# Patient Record
Sex: FEMALE | Race: WHITE | NOT HISPANIC OR LATINO | Employment: FULL TIME | URBAN - METROPOLITAN AREA
[De-identification: names, ages, dates, MRNs, and addresses within clinical notes are randomized per-mention and may not be internally consistent; named-entity substitution may affect disease eponyms.]

---

## 2021-06-12 ENCOUNTER — HOSPITAL ENCOUNTER (EMERGENCY)
Facility: HOSPITAL | Age: 36
Discharge: HOME | End: 2021-06-12
Attending: EMERGENCY MEDICINE | Admitting: EMERGENCY MEDICINE
Payer: COMMERCIAL

## 2021-06-12 ENCOUNTER — APPOINTMENT (EMERGENCY)
Dept: RADIOLOGY | Facility: HOSPITAL | Age: 36
End: 2021-06-12
Payer: COMMERCIAL

## 2021-06-12 VITALS
SYSTOLIC BLOOD PRESSURE: 139 MMHG | BODY MASS INDEX: 32.82 KG/M2 | OXYGEN SATURATION: 94 % | HEIGHT: 65 IN | RESPIRATION RATE: 18 BRPM | WEIGHT: 197 LBS | DIASTOLIC BLOOD PRESSURE: 83 MMHG | HEART RATE: 84 BPM | TEMPERATURE: 97.5 F

## 2021-06-12 DIAGNOSIS — J45.909 ASTHMATIC BRONCHITIS: Primary | ICD-10-CM

## 2021-06-12 LAB — SARS-COV-2 RNA RESP QL NAA+PROBE: NEGATIVE

## 2021-06-12 PROCEDURE — U0005 INFEC AGEN DETEC AMPLI PROBE: HCPCS | Performed by: PHYSICIAN ASSISTANT

## 2021-06-12 PROCEDURE — 71045 X-RAY EXAM CHEST 1 VIEW: CPT

## 2021-06-12 PROCEDURE — U0003 INFECTIOUS AGENT DETECTION BY NUCLEIC ACID (DNA OR RNA); SEVERE ACUTE RESPIRATORY SYNDROME CORONAVIRUS 2 (SARS-COV-2) (CORONAVIRUS DISEASE [COVID-19]), AMPLIFIED PROBE TECHNIQUE, MAKING USE OF HIGH THROUGHPUT TECHNOLOGIES AS DESCRIBED BY CMS-2020-01-R: HCPCS | Performed by: PHYSICIAN ASSISTANT

## 2021-06-12 PROCEDURE — 99284 EMERGENCY DEPT VISIT MOD MDM: CPT | Performed by: PHYSICIAN ASSISTANT

## 2021-06-12 PROCEDURE — 99285 EMERGENCY DEPT VISIT HI MDM: CPT

## 2021-06-12 RX ORDER — ALBUTEROL SULFATE 90 UG/1
4 AEROSOL, METERED RESPIRATORY (INHALATION) ONCE
Status: COMPLETED | OUTPATIENT
Start: 2021-06-12 | End: 2021-06-12

## 2021-06-12 RX ORDER — AZITHROMYCIN 250 MG/1
250 TABLET, FILM COATED ORAL EVERY 24 HOURS
Qty: 4 TABLET | Refills: 0 | Status: SHIPPED | OUTPATIENT
Start: 2021-06-13 | End: 2021-06-17

## 2021-06-12 RX ORDER — ALBUTEROL SULFATE 90 UG/1
2 AEROSOL, METERED RESPIRATORY (INHALATION) EVERY 4 HOURS PRN
Qty: 8 G | Refills: 0 | Status: SHIPPED | OUTPATIENT
Start: 2021-06-12

## 2021-06-12 RX ORDER — AZITHROMYCIN 500 MG/1
500 TABLET, FILM COATED ORAL ONCE
Status: COMPLETED | OUTPATIENT
Start: 2021-06-12 | End: 2021-06-12

## 2021-06-12 RX ADMIN — AZITHROMYCIN 500 MG: 500 TABLET, FILM COATED ORAL at 20:11

## 2021-06-12 RX ADMIN — ALBUTEROL SULFATE 4 PUFF: 90 AEROSOL, METERED RESPIRATORY (INHALATION) at 19:27

## 2021-06-12 NOTE — Clinical Note
Surendra Starr was seen and treated in our emergency department on 6/12/2021  Diagnosis:     Kofi Segal  may return to work on return date  She may return on this date: 06/16/2021         If you have any questions or concerns, please don't hesitate to call        Sherine Alicia PA-C    ______________________________           _______________          _______________  Hospital Representative                              Date                                Time

## 2021-06-12 NOTE — ED PROVIDER NOTES
History  Chief Complaint   Patient presents with    Shortness of Breath     pt started with a sore throat on wednesday and seen by pcp, told she had broncitis  pt states she feels like she cant take a deep breath and has fevers in 100 0 at home  pt states she has a cough and is bringing up mucus  Patient is a 42 y/o F with h/o bipolar disorder that presents to the ED with cough, runny nose, sore throat that started 2 days ago  She saw her PCP yesterday and was started on prednisone for bronchitis  She states she does not feel better  She denies fevers, chills  She states she is coughing up yellow mucus  No sick contacts  She has not received the covid vaccine, but states she was tested 4 days ago for work and was negative  She denies chest pain  She does feel SOB  No leg pain or swelling  History provided by:  Patient  Shortness of Breath  Severity:  Moderate  Onset quality:  Gradual  Duration:  3 days  Timing:  Constant  Progression:  Worsening  Chronicity:  New  Context: URI    Relieved by:  Nothing  Worsened by:  Exertion  Ineffective treatments: prednisone  Associated symptoms: cough, sore throat and wheezing    Associated symptoms: no abdominal pain, no chest pain, no fever, no rash and no vomiting    Risk factors: no prolonged immobilization, no recent surgery and no tobacco use        Prior to Admission Medications   Prescriptions Last Dose Informant Patient Reported? Taking?    lurasidone (LATUDA) 20 mg tablet Not Taking at Unknown time  No No   Sig: Take 1 tablet by mouth daily with dinner   Patient not taking: Reported on 6/12/2021   meclizine (ANTIVERT) 25 mg tablet   No No   Sig: Take 1 tablet by mouth 3 (three) times a day as needed for dizziness for up to 30 days   methocarbamol (ROBAXIN) 500 mg tablet   No No   Sig: Take 1 tablet by mouth 2 (two) times a day for 30 days   naproxen (NAPROSYN) 500 mg tablet   No No   Sig: Take 1 tablet by mouth 2 (two) times a day with meals for 30 days      Facility-Administered Medications: None       Past Medical History:   Diagnosis Date    Anxiety     Bipolar disorder ([facility] )     Borderline personality disorder ([facility] )     Depression     Psychiatric illness     Self-injurious behavior     Suicide attempt ([facility] )        History reviewed  No pertinent surgical history  Family History   Problem Relation Age of Onset    Suicide Attempts Father     Self-Injury Father     Depression Maternal Uncle     Depression Paternal Uncle      I have reviewed and agree with the history as documented  E-Cigarette/Vaping     E-Cigarette/Vaping Substances     Social History     Tobacco Use    Smoking status: Current Every Day Smoker     Packs/day: 0 50    Smokeless tobacco: Never Used   Substance Use Topics    Alcohol use: Not Currently     Alcohol/week: 1 0 standard drinks     Types: 1 Glasses of wine per week     Comment: social    Drug use: No       Review of Systems   Constitutional: Negative for chills and fever  HENT: Positive for rhinorrhea and sore throat  Respiratory: Positive for cough, shortness of breath and wheezing  Cardiovascular: Negative for chest pain and leg swelling  Gastrointestinal: Negative for abdominal pain, diarrhea, nausea and vomiting  Skin: Negative for color change and rash  Neurological: Negative for dizziness, weakness and numbness  Psychiatric/Behavioral: Negative for confusion  All other systems reviewed and are negative  Physical Exam  Physical Exam  Vitals signs and nursing note reviewed  Constitutional:       General: She is not in acute distress  Appearance: Normal appearance  She is well-developed, well-groomed and overweight  She is not ill-appearing or diaphoretic  HENT:      Head: Normocephalic and atraumatic  Right Ear: External ear normal       Left Ear: External ear normal       Nose: Nose normal       Mouth/Throat:      Pharynx: Oropharynx is clear     Eyes: Conjunctiva/sclera: Conjunctivae normal    Neck:      Musculoskeletal: Normal range of motion and neck supple  Cardiovascular:      Rate and Rhythm: Normal rate and regular rhythm  Heart sounds: Normal heart sounds  Pulmonary:      Effort: Pulmonary effort is normal       Breath sounds: Wheezing (diffuse) present  No decreased breath sounds  Abdominal:      General: Abdomen is flat  Bowel sounds are normal       Palpations: Abdomen is soft  Tenderness: There is no abdominal tenderness  Musculoskeletal: Normal range of motion  General: No swelling or tenderness  Right lower leg: No edema  Left lower leg: No edema  Skin:     General: Skin is warm and dry  Coloration: Skin is not pale  Findings: No bruising or rash  Neurological:      General: No focal deficit present  Mental Status: She is alert and oriented to person, place, and time  Motor: No weakness  Psychiatric:         Mood and Affect: Mood normal          Behavior: Behavior is cooperative           Vital Signs  ED Triage Vitals [06/12/21 1859]   Temperature Pulse Respirations Blood Pressure SpO2   97 5 °F (36 4 °C) 85 18 129/77 93 %      Temp src Heart Rate Source Patient Position - Orthostatic VS BP Location FiO2 (%)   -- -- -- -- --      Pain Score       --           Vitals:    06/12/21 1859 06/12/21 1915   BP: 129/77 139/83   Pulse: 85 84         Visual Acuity  Visual Acuity      Most Recent Value   L Pupil Size (mm)  3   R Pupil Size (mm)  3          ED Medications  Medications   albuterol (PROVENTIL HFA,VENTOLIN HFA) inhaler 4 puff (4 puffs Inhalation Given 6/12/21 1927)   azithromycin (ZITHROMAX) tablet 500 mg (500 mg Oral Given 6/12/21 2011)       Diagnostic Studies  Results Reviewed     Procedure Component Value Units Date/Time    Novel Coronavirus (Covid-19),PCR [facility] - 2 Hour Stat [[id number]]  (Normal) Collected: 06/12/21 1927    Lab Status: Final result Specimen: Nasopharyngeal Swab Updated: 06/12/21 2024     SARS-CoV-2 Negative    Narrative: The specimen collection materials, transport medium, and/or testing methodology utilized in the production of these test results have been proven to be reliable in a limited validation with an abbreviated program under the Emergency Utilization Authorization provided by the FDA  Testing reported as "Presumptive positive" will be confirmed with secondary testing to ensure result accuracy  Clinical caution and judgement should be used with the interpretation of these results with consideration of the clinical impression and other laboratory testing  Testing reported as "Positive" or "Negative" has been proven to be accurate according to standard laboratory validation requirements  All testing is performed with control materials showing appropriate reactivity at standard intervals  XR chest 1 view portable   ED Interpretation by Catherine Saenz PA-C (06/12 1953)   No acute abnormalities  Procedures  Procedures         ED Course  ED Course as of Jun 12 2044   Sat Jun 12, 2021 1958 Patient feeling better, pulse ox 94% RA, wheezing improved, she does have rhonchi b/l bases  SBIRT 20yo+      Most Recent Value   SBIRT (22 yo +)   In order to provide better care to our patients, we are screening all of our patients for alcohol and drug use  Would it be okay to ask you these screening questions? No Filed at: 06/12/2021 1922                    MDM  Number of Diagnoses or Management Options  Asthmatic bronchitis: established and worsening  Diagnosis management comments: Patient with worsening cough, will order cxr to r/o pneumonia  covid test pending  Patient's pulse ox stabel at 94%, breath sounds improved with inhaler, patient currently on prednisone, will add inhaler and zpak to current treatment  Return precautions given          Amount and/or Complexity of Data Reviewed  Tests in the radiology section of CPT®: ordered and reviewed  Independent visualization of images, tracings, or specimens: yes    Patient Progress  Patient progress: improved      Disposition  Final diagnoses:   Asthmatic bronchitis     Time reflects when diagnosis was documented in both MDM as applicable and the Disposition within this note     Time User Action Codes Description Comment    6/12/2021  7:59 PM Lorie Sharpe [G50 284] Asthmatic bronchitis       ED Disposition     ED Disposition Condition Date/Time Comment    Discharge Stable Sat Jun 12, 2021  7:59  [address] discharge to home/self care              Follow-up Information     Follow up With Specialties Details Why Contact Info    Radha Rdz,  Family Medicine Call in 2 days For recheck [facility]  [address] [facility]  [id number] [place] [address] [address]            Discharge Medication List as of 6/12/2021  8:01 PM      START taking these medications    Details   albuterol (PROVENTIL HFA,VENTOLIN HFA) 90 mcg/act inhaler Inhale 2 puffs every 4 (four) hours as needed for wheezing, Starting Sat 6/12/2021, Normal      azithromycin (ZITHROMAX) 250 mg tablet Take 1 tablet (250 mg total) by mouth every 24 hours for 4 days Take 2 tablets today then 1 tablet daily x 4 days, Starting Sun 6/13/2021, Until Thu 6/17/2021, Normal         CONTINUE these medications which have NOT CHANGED    Details   lurasidone (LATUDA) 20 mg tablet Take 1 tablet by mouth daily with dinner, Starting 12/12/2016, Until Discontinued, Print      meclizine (ANTIVERT) 25 mg tablet Take 1 tablet by mouth 3 (three) times a day as needed for dizziness for up to 30 days, Starting 12/21/2016, Until Fri 1/20/17, Print      methocarbamol (ROBAXIN) 500 mg tablet Take 1 tablet by mouth 2 (two) times a day for 30 days, Starting 12/21/2016, Until Fri 1/20/17, Print      naproxen (NAPROSYN) 500 mg tablet Take 1 tablet by mouth 2 (two) times a day with meals for 30 days, Starting 12/21/2016, Until Fri 1/20/17, Print           No discharge procedures on file      PDMP Review     None          ED Provider  Electronically Signed by           Adrian Sr PA-C  06/12/21 5172

## 2021-06-13 NOTE — DISCHARGE INSTRUCTIONS
Rest, increase fluids  Continue prednisone  Take zithromax as directed  Use albuterol inhaler every 4 hours  Take mucinex daily  Follow up with family doctor in 2 days for recheck  Return to ER if symptoms worsen  Monitor pulse ox at home if it drops below 90% return to ER

## 2021-11-02 ENCOUNTER — HOSPITAL ENCOUNTER (EMERGENCY)
Facility: HOSPITAL | Age: 36
End: 2021-11-03
Attending: EMERGENCY MEDICINE | Admitting: EMERGENCY MEDICINE
Payer: COMMERCIAL

## 2021-11-02 DIAGNOSIS — R45.851 DEPRESSION WITH SUICIDAL IDEATION: Primary | ICD-10-CM

## 2021-11-02 DIAGNOSIS — F41.1 GENERALIZED ANXIETY DISORDER: ICD-10-CM

## 2021-11-02 DIAGNOSIS — F32.A DEPRESSION WITH SUICIDAL IDEATION: Primary | ICD-10-CM

## 2021-11-02 LAB
AMPHETAMINES SERPL QL SCN: NEGATIVE
BARBITURATES UR QL: NEGATIVE
BENZODIAZ UR QL: NEGATIVE
COCAINE UR QL: NEGATIVE
ETHANOL EXG-MCNC: 0 MG/DL
EXT PREG TEST URINE: NEGATIVE
EXT. CONTROL ED NAV: NORMAL
FLUAV RNA RESP QL NAA+PROBE: NEGATIVE
FLUBV RNA RESP QL NAA+PROBE: NEGATIVE
METHADONE UR QL: NEGATIVE
OPIATES UR QL SCN: NEGATIVE
OXYCODONE+OXYMORPHONE UR QL SCN: NEGATIVE
PCP UR QL: NEGATIVE
RSV RNA RESP QL NAA+PROBE: NEGATIVE
SARS-COV-2 RNA RESP QL NAA+PROBE: NEGATIVE
THC UR QL: NEGATIVE

## 2021-11-02 PROCEDURE — 99285 EMERGENCY DEPT VISIT HI MDM: CPT | Performed by: PHYSICIAN ASSISTANT

## 2021-11-02 PROCEDURE — 99285 EMERGENCY DEPT VISIT HI MDM: CPT

## 2021-11-02 PROCEDURE — 82075 ASSAY OF BREATH ETHANOL: CPT | Performed by: PHYSICIAN ASSISTANT

## 2021-11-02 PROCEDURE — 0241U HB NFCT DS VIR RESP RNA 4 TRGT: CPT | Performed by: PHYSICIAN ASSISTANT

## 2021-11-02 PROCEDURE — 81025 URINE PREGNANCY TEST: CPT | Performed by: PHYSICIAN ASSISTANT

## 2021-11-02 PROCEDURE — 80307 DRUG TEST PRSMV CHEM ANLYZR: CPT | Performed by: PHYSICIAN ASSISTANT

## 2021-11-02 RX ORDER — LANOLIN ALCOHOL/MO/W.PET/CERES
3 CREAM (GRAM) TOPICAL
Status: CANCELLED | OUTPATIENT
Start: 2021-11-02

## 2021-11-02 RX ORDER — BENZTROPINE MESYLATE 0.5 MG/1
1 TABLET ORAL
Status: CANCELLED | OUTPATIENT
Start: 2021-11-02

## 2021-11-02 RX ORDER — ACETAMINOPHEN 325 MG/1
975 TABLET ORAL EVERY 6 HOURS PRN
Status: CANCELLED | OUTPATIENT
Start: 2021-11-02

## 2021-11-02 RX ORDER — BENZTROPINE MESYLATE 1 MG/ML
1 INJECTION INTRAMUSCULAR; INTRAVENOUS
Status: CANCELLED | OUTPATIENT
Start: 2021-11-02

## 2021-11-02 RX ORDER — DIPHENHYDRAMINE HYDROCHLORIDE 50 MG/ML
50 INJECTION INTRAMUSCULAR; INTRAVENOUS EVERY 6 HOURS PRN
Status: CANCELLED | OUTPATIENT
Start: 2021-11-02

## 2021-11-02 RX ORDER — LORAZEPAM 2 MG/ML
2 INJECTION INTRAMUSCULAR EVERY 6 HOURS PRN
Status: CANCELLED | OUTPATIENT
Start: 2021-11-02

## 2021-11-02 RX ORDER — MAGNESIUM HYDROXIDE/ALUMINUM HYDROXICE/SIMETHICONE 120; 1200; 1200 MG/30ML; MG/30ML; MG/30ML
30 SUSPENSION ORAL EVERY 4 HOURS PRN
Status: CANCELLED | OUTPATIENT
Start: 2021-11-02

## 2021-11-02 RX ORDER — HALOPERIDOL 5 MG
5 TABLET ORAL
Status: CANCELLED | OUTPATIENT
Start: 2021-11-02

## 2021-11-02 RX ORDER — MINERAL OIL AND PETROLATUM 150; 830 MG/G; MG/G
1 OINTMENT OPHTHALMIC
Status: CANCELLED | OUTPATIENT
Start: 2021-11-02

## 2021-11-02 RX ORDER — HALOPERIDOL 1 MG/1
1 TABLET ORAL EVERY 6 HOURS PRN
Status: CANCELLED | OUTPATIENT
Start: 2021-11-02

## 2021-11-02 RX ORDER — NICOTINE 21 MG/24HR
1 PATCH, TRANSDERMAL 24 HOURS TRANSDERMAL DAILY
Status: CANCELLED | OUTPATIENT
Start: 2021-11-03

## 2021-11-02 RX ORDER — TRAZODONE HYDROCHLORIDE 50 MG/1
50 TABLET ORAL
Status: CANCELLED | OUTPATIENT
Start: 2021-11-02

## 2021-11-02 RX ORDER — ACETAMINOPHEN 325 MG/1
650 TABLET ORAL EVERY 6 HOURS PRN
Status: CANCELLED | OUTPATIENT
Start: 2021-11-02

## 2021-11-02 RX ORDER — BENZTROPINE MESYLATE 1 MG/ML
0.5 INJECTION INTRAMUSCULAR; INTRAVENOUS
Status: CANCELLED | OUTPATIENT
Start: 2021-11-02

## 2021-11-02 RX ORDER — BISACODYL 10 MG
10 SUPPOSITORY, RECTAL RECTAL DAILY PRN
Status: CANCELLED | OUTPATIENT
Start: 2021-11-02

## 2021-11-02 RX ORDER — AMOXICILLIN 250 MG
1 CAPSULE ORAL DAILY PRN
Status: CANCELLED | OUTPATIENT
Start: 2021-11-02

## 2021-11-02 RX ORDER — ACETAMINOPHEN 325 MG/1
650 TABLET ORAL EVERY 4 HOURS PRN
Status: CANCELLED | OUTPATIENT
Start: 2021-11-02

## 2021-11-02 RX ORDER — LORAZEPAM 2 MG/ML
1 INJECTION INTRAMUSCULAR
Status: CANCELLED | OUTPATIENT
Start: 2021-11-02

## 2021-11-02 RX ORDER — HYDROXYZINE HYDROCHLORIDE 25 MG/1
100 TABLET, FILM COATED ORAL
Status: CANCELLED | OUTPATIENT
Start: 2021-11-02

## 2021-11-02 RX ORDER — HALOPERIDOL 5 MG/ML
2.5 INJECTION INTRAMUSCULAR
Status: CANCELLED | OUTPATIENT
Start: 2021-11-02

## 2021-11-02 RX ORDER — HALOPERIDOL 5 MG/ML
5 INJECTION INTRAMUSCULAR
Status: CANCELLED | OUTPATIENT
Start: 2021-11-02

## 2021-11-02 RX ORDER — HYDROXYZINE HYDROCHLORIDE 25 MG/1
25 TABLET, FILM COATED ORAL
Status: CANCELLED | OUTPATIENT
Start: 2021-11-02

## 2021-11-02 RX ORDER — POLYETHYLENE GLYCOL 3350 17 G/17G
17 POWDER, FOR SOLUTION ORAL DAILY PRN
Status: CANCELLED | OUTPATIENT
Start: 2021-11-02

## 2021-11-02 RX ORDER — LORAZEPAM 2 MG/ML
2 INJECTION INTRAMUSCULAR
Status: CANCELLED | OUTPATIENT
Start: 2021-11-02

## 2021-11-02 RX ORDER — HYDROXYZINE HYDROCHLORIDE 25 MG/1
50 TABLET, FILM COATED ORAL
Status: CANCELLED | OUTPATIENT
Start: 2021-11-02

## 2021-11-03 ENCOUNTER — HOSPITAL ENCOUNTER (INPATIENT)
Facility: HOSPITAL | Age: 36
LOS: 7 days | Discharge: HOME | DRG: 885 | End: 2021-11-10
Attending: STUDENT IN AN ORGANIZED HEALTH CARE EDUCATION/TRAINING PROGRAM | Admitting: PSYCHIATRY & NEUROLOGY
Payer: COMMERCIAL

## 2021-11-03 VITALS
HEIGHT: 65 IN | RESPIRATION RATE: 17 BRPM | BODY MASS INDEX: 33.82 KG/M2 | OXYGEN SATURATION: 97 % | SYSTOLIC BLOOD PRESSURE: 122 MMHG | DIASTOLIC BLOOD PRESSURE: 67 MMHG | WEIGHT: 203 LBS | HEART RATE: 79 BPM | TEMPERATURE: 97.8 F

## 2021-11-03 DIAGNOSIS — G47.00 INSOMNIA: Primary | ICD-10-CM

## 2021-11-03 DIAGNOSIS — Z72.0 NICOTINE USE: ICD-10-CM

## 2021-11-03 DIAGNOSIS — B37.89 CANDIDA RASH OF GROIN: ICD-10-CM

## 2021-11-03 DIAGNOSIS — F41.1 GENERALIZED ANXIETY DISORDER: ICD-10-CM

## 2021-11-03 DIAGNOSIS — F31.32 BIPOLAR AFFECTIVE DISORDER, CURRENTLY DEPRESSED, MODERATE (HCC): ICD-10-CM

## 2021-11-03 PROCEDURE — 99222 1ST HOSP IP/OBS MODERATE 55: CPT | Performed by: STUDENT IN AN ORGANIZED HEALTH CARE EDUCATION/TRAINING PROGRAM

## 2021-11-03 RX ORDER — ESCITALOPRAM OXALATE 10 MG/1
10 TABLET ORAL DAILY
Status: DISCONTINUED | OUTPATIENT
Start: 2021-11-03 | End: 2021-11-05

## 2021-11-03 RX ORDER — MAGNESIUM HYDROXIDE/ALUMINUM HYDROXICE/SIMETHICONE 120; 1200; 1200 MG/30ML; MG/30ML; MG/30ML
30 SUSPENSION ORAL EVERY 4 HOURS PRN
Status: DISCONTINUED | OUTPATIENT
Start: 2021-11-03 | End: 2021-11-10 | Stop reason: HOSPADM

## 2021-11-03 RX ORDER — HALOPERIDOL 5 MG
5 TABLET ORAL
Status: DISCONTINUED | OUTPATIENT
Start: 2021-11-03 | End: 2021-11-10 | Stop reason: HOSPADM

## 2021-11-03 RX ORDER — HALOPERIDOL 5 MG/ML
5 INJECTION INTRAMUSCULAR
Status: DISCONTINUED | OUTPATIENT
Start: 2021-11-03 | End: 2021-11-10 | Stop reason: HOSPADM

## 2021-11-03 RX ORDER — BENZTROPINE MESYLATE 1 MG/ML
1 INJECTION INTRAMUSCULAR; INTRAVENOUS
Status: DISCONTINUED | OUTPATIENT
Start: 2021-11-03 | End: 2021-11-10 | Stop reason: HOSPADM

## 2021-11-03 RX ORDER — LORAZEPAM 2 MG/ML
2 INJECTION INTRAMUSCULAR EVERY 6 HOURS PRN
Status: DISCONTINUED | OUTPATIENT
Start: 2021-11-03 | End: 2021-11-10 | Stop reason: HOSPADM

## 2021-11-03 RX ORDER — ACETAMINOPHEN 325 MG/1
975 TABLET ORAL EVERY 6 HOURS PRN
Status: DISCONTINUED | OUTPATIENT
Start: 2021-11-03 | End: 2021-11-10 | Stop reason: HOSPADM

## 2021-11-03 RX ORDER — ACETAMINOPHEN 325 MG/1
650 TABLET ORAL EVERY 6 HOURS PRN
Status: DISCONTINUED | OUTPATIENT
Start: 2021-11-03 | End: 2021-11-10 | Stop reason: HOSPADM

## 2021-11-03 RX ORDER — BISACODYL 10 MG
10 SUPPOSITORY, RECTAL RECTAL DAILY PRN
Status: DISCONTINUED | OUTPATIENT
Start: 2021-11-03 | End: 2021-11-10 | Stop reason: HOSPADM

## 2021-11-03 RX ORDER — AMOXICILLIN 250 MG
1 CAPSULE ORAL DAILY PRN
Status: DISCONTINUED | OUTPATIENT
Start: 2021-11-03 | End: 2021-11-10 | Stop reason: HOSPADM

## 2021-11-03 RX ORDER — HALOPERIDOL 1 MG/1
1 TABLET ORAL EVERY 6 HOURS PRN
Status: DISCONTINUED | OUTPATIENT
Start: 2021-11-03 | End: 2021-11-10 | Stop reason: HOSPADM

## 2021-11-03 RX ORDER — ACETAMINOPHEN 325 MG/1
650 TABLET ORAL EVERY 4 HOURS PRN
Status: DISCONTINUED | OUTPATIENT
Start: 2021-11-03 | End: 2021-11-10 | Stop reason: HOSPADM

## 2021-11-03 RX ORDER — DIPHENHYDRAMINE HYDROCHLORIDE 50 MG/ML
50 INJECTION INTRAMUSCULAR; INTRAVENOUS EVERY 6 HOURS PRN
Status: DISCONTINUED | OUTPATIENT
Start: 2021-11-03 | End: 2021-11-10 | Stop reason: HOSPADM

## 2021-11-03 RX ORDER — HYDROXYZINE 50 MG/1
50 TABLET, FILM COATED ORAL
Status: DISCONTINUED | OUTPATIENT
Start: 2021-11-03 | End: 2021-11-10 | Stop reason: HOSPADM

## 2021-11-03 RX ORDER — LORAZEPAM 2 MG/ML
1 INJECTION INTRAMUSCULAR
Status: DISCONTINUED | OUTPATIENT
Start: 2021-11-03 | End: 2021-11-10 | Stop reason: HOSPADM

## 2021-11-03 RX ORDER — NICOTINE 21 MG/24HR
1 PATCH, TRANSDERMAL 24 HOURS TRANSDERMAL DAILY
Status: DISCONTINUED | OUTPATIENT
Start: 2021-11-03 | End: 2021-11-10 | Stop reason: HOSPADM

## 2021-11-03 RX ORDER — HYDROXYZINE HYDROCHLORIDE 25 MG/1
25 TABLET, FILM COATED ORAL
Status: DISCONTINUED | OUTPATIENT
Start: 2021-11-03 | End: 2021-11-10 | Stop reason: HOSPADM

## 2021-11-03 RX ORDER — LANOLIN ALCOHOL/MO/W.PET/CERES
3 CREAM (GRAM) TOPICAL
Status: DISCONTINUED | OUTPATIENT
Start: 2021-11-03 | End: 2021-11-10 | Stop reason: HOSPADM

## 2021-11-03 RX ORDER — MINERAL OIL AND PETROLATUM 150; 830 MG/G; MG/G
1 OINTMENT OPHTHALMIC
Status: DISCONTINUED | OUTPATIENT
Start: 2021-11-03 | End: 2021-11-10 | Stop reason: HOSPADM

## 2021-11-03 RX ORDER — HALOPERIDOL 5 MG
2.5 TABLET ORAL
Status: DISCONTINUED | OUTPATIENT
Start: 2021-11-03 | End: 2021-11-10 | Stop reason: HOSPADM

## 2021-11-03 RX ORDER — TRAZODONE HYDROCHLORIDE 50 MG/1
50 TABLET ORAL
Status: DISCONTINUED | OUTPATIENT
Start: 2021-11-03 | End: 2021-11-10 | Stop reason: HOSPADM

## 2021-11-03 RX ORDER — QUETIAPINE FUMARATE 25 MG/1
50 TABLET, FILM COATED ORAL
Status: DISCONTINUED | OUTPATIENT
Start: 2021-11-03 | End: 2021-11-09

## 2021-11-03 RX ORDER — ALBUTEROL SULFATE 90 UG/1
2 AEROSOL, METERED RESPIRATORY (INHALATION) EVERY 4 HOURS PRN
Status: DISCONTINUED | OUTPATIENT
Start: 2021-11-03 | End: 2021-11-10 | Stop reason: HOSPADM

## 2021-11-03 RX ORDER — HALOPERIDOL 5 MG/ML
2.5 INJECTION INTRAMUSCULAR
Status: DISCONTINUED | OUTPATIENT
Start: 2021-11-03 | End: 2021-11-10 | Stop reason: HOSPADM

## 2021-11-03 RX ORDER — POLYETHYLENE GLYCOL 3350 17 G/17G
17 POWDER, FOR SOLUTION ORAL DAILY PRN
Status: DISCONTINUED | OUTPATIENT
Start: 2021-11-03 | End: 2021-11-10 | Stop reason: HOSPADM

## 2021-11-03 RX ORDER — HYDROXYZINE 50 MG/1
100 TABLET, FILM COATED ORAL
Status: DISCONTINUED | OUTPATIENT
Start: 2021-11-03 | End: 2021-11-10 | Stop reason: HOSPADM

## 2021-11-03 RX ORDER — BENZTROPINE MESYLATE 1 MG/1
1 TABLET ORAL
Status: DISCONTINUED | OUTPATIENT
Start: 2021-11-03 | End: 2021-11-10 | Stop reason: HOSPADM

## 2021-11-03 RX ORDER — BENZTROPINE MESYLATE 1 MG/ML
0.5 INJECTION INTRAMUSCULAR; INTRAVENOUS
Status: DISCONTINUED | OUTPATIENT
Start: 2021-11-03 | End: 2021-11-10 | Stop reason: HOSPADM

## 2021-11-03 RX ORDER — LORAZEPAM 2 MG/ML
2 INJECTION INTRAMUSCULAR
Status: DISCONTINUED | OUTPATIENT
Start: 2021-11-03 | End: 2021-11-10 | Stop reason: HOSPADM

## 2021-11-03 RX ADMIN — QUETIAPINE FUMARATE 50 MG: 25 TABLET ORAL at 21:12

## 2021-11-03 RX ADMIN — Medication 3 MG: at 21:12

## 2021-11-03 RX ADMIN — ALBUTEROL SULFATE 2 PUFF: 90 AEROSOL, METERED RESPIRATORY (INHALATION) at 18:58

## 2021-11-03 RX ADMIN — ALUMINUM HYDROXIDE, MAGNESIUM HYDROXIDE, AND SIMETHICONE 30 ML: 200; 200; 20 SUSPENSION ORAL at 10:48

## 2021-11-03 RX ADMIN — ESCITALOPRAM OXALATE 10 MG: 10 TABLET ORAL at 11:51

## 2021-11-04 PROBLEM — J40 BRONCHITIS: Status: ACTIVE | Noted: 2021-11-04

## 2021-11-04 PROBLEM — B37.89 CANDIDA RASH OF GROIN: Status: ACTIVE | Noted: 2021-11-04

## 2021-11-04 PROBLEM — Z00.8 MEDICAL CLEARANCE FOR PSYCHIATRIC ADMISSION: Status: ACTIVE | Noted: 2021-11-04

## 2021-11-04 LAB
25(OH)D3 SERPL-MCNC: 20.4 NG/ML (ref 30–100)
ALBUMIN SERPL BCP-MCNC: 3.5 G/DL (ref 3.5–5)
ALP SERPL-CCNC: 66 U/L (ref 46–116)
ALT SERPL W P-5'-P-CCNC: 25 U/L (ref 12–78)
ANION GAP SERPL CALCULATED.3IONS-SCNC: 13 MMOL/L (ref 4–13)
AST SERPL W P-5'-P-CCNC: 15 U/L (ref 5–45)
BASOPHILS # BLD AUTO: 0.06 THOUSANDS/ΜL (ref 0–0.1)
BASOPHILS NFR BLD AUTO: 1 % (ref 0–1)
BILIRUB SERPL-MCNC: 0.2 MG/DL (ref 0.2–1)
BUN SERPL-MCNC: 15 MG/DL (ref 5–25)
CALCIUM SERPL-MCNC: 8.9 MG/DL (ref 8.3–10.1)
CHLORIDE SERPL-SCNC: 104 MMOL/L (ref 100–108)
CHOLEST SERPL-MCNC: 171 MG/DL (ref 50–200)
CO2 SERPL-SCNC: 25 MMOL/L (ref 21–32)
CREAT SERPL-MCNC: 0.82 MG/DL (ref 0.6–1.3)
EOSINOPHIL # BLD AUTO: 0.44 THOUSAND/ΜL (ref 0–0.61)
EOSINOPHIL NFR BLD AUTO: 5 % (ref 0–6)
ERYTHROCYTE [DISTWIDTH] IN BLOOD BY AUTOMATED COUNT: 13.1 % (ref 11.6–15.1)
EST. AVERAGE GLUCOSE BLD GHB EST-MCNC: 111 MG/DL
GFR SERPL CREATININE-BSD FRML MDRD: 91 ML/MIN/1.73SQ M
GLUCOSE P FAST SERPL-MCNC: 89 MG/DL (ref 65–99)
GLUCOSE SERPL-MCNC: 89 MG/DL (ref 65–140)
HBA1C MFR BLD: 5.5 %
HCT VFR BLD AUTO: 43.1 % (ref 34.8–46.1)
HDLC SERPL-MCNC: 44 MG/DL
HGB BLD-MCNC: 13.7 G/DL (ref 11.5–15.4)
IMM GRANULOCYTES # BLD AUTO: 0.01 THOUSAND/UL (ref 0–0.2)
IMM GRANULOCYTES NFR BLD AUTO: 0 % (ref 0–2)
LDLC SERPL CALC-MCNC: 106 MG/DL (ref 0–100)
LYMPHOCYTES # BLD AUTO: 2.97 THOUSANDS/ΜL (ref 0.6–4.47)
LYMPHOCYTES NFR BLD AUTO: 34 % (ref 14–44)
MCH RBC QN AUTO: 28.7 PG (ref 26.8–34.3)
MCHC RBC AUTO-ENTMCNC: 31.8 G/DL (ref 31.4–37.4)
MCV RBC AUTO: 90 FL (ref 82–98)
MONOCYTES # BLD AUTO: 0.9 THOUSAND/ΜL (ref 0.17–1.22)
MONOCYTES NFR BLD AUTO: 10 % (ref 4–12)
NEUTROPHILS # BLD AUTO: 4.41 THOUSANDS/ΜL (ref 1.85–7.62)
NEUTS SEG NFR BLD AUTO: 50 % (ref 43–75)
NONHDLC SERPL-MCNC: 127 MG/DL
NRBC BLD AUTO-RTO: 0 /100 WBCS
PLATELET # BLD AUTO: 347 THOUSANDS/UL (ref 149–390)
PMV BLD AUTO: 10.1 FL (ref 8.9–12.7)
POTASSIUM SERPL-SCNC: 4.1 MMOL/L (ref 3.5–5.3)
PROT SERPL-MCNC: 7 G/DL (ref 6.4–8.2)
RBC # BLD AUTO: 4.78 MILLION/UL (ref 3.81–5.12)
RPR SER QL: NORMAL
SODIUM SERPL-SCNC: 142 MMOL/L (ref 136–145)
TRIGL SERPL-MCNC: 105 MG/DL
TSH SERPL DL<=0.05 MIU/L-ACNC: 1.3 UIU/ML (ref 0.36–3.74)
VIT B12 SERPL-MCNC: 423 PG/ML (ref 100–900)
WBC # BLD AUTO: 8.79 THOUSAND/UL (ref 4.31–10.16)

## 2021-11-04 PROCEDURE — 85025 COMPLETE CBC W/AUTO DIFF WBC: CPT | Performed by: STUDENT IN AN ORGANIZED HEALTH CARE EDUCATION/TRAINING PROGRAM

## 2021-11-04 PROCEDURE — 82607 VITAMIN B-12: CPT | Performed by: STUDENT IN AN ORGANIZED HEALTH CARE EDUCATION/TRAINING PROGRAM

## 2021-11-04 PROCEDURE — 84443 ASSAY THYROID STIM HORMONE: CPT | Performed by: STUDENT IN AN ORGANIZED HEALTH CARE EDUCATION/TRAINING PROGRAM

## 2021-11-04 PROCEDURE — 99223 1ST HOSP IP/OBS HIGH 75: CPT | Performed by: PHYSICIAN ASSISTANT

## 2021-11-04 PROCEDURE — 99232 SBSQ HOSP IP/OBS MODERATE 35: CPT | Performed by: STUDENT IN AN ORGANIZED HEALTH CARE EDUCATION/TRAINING PROGRAM

## 2021-11-04 PROCEDURE — 80053 COMPREHEN METABOLIC PANEL: CPT | Performed by: STUDENT IN AN ORGANIZED HEALTH CARE EDUCATION/TRAINING PROGRAM

## 2021-11-04 PROCEDURE — 83036 HEMOGLOBIN GLYCOSYLATED A1C: CPT | Performed by: STUDENT IN AN ORGANIZED HEALTH CARE EDUCATION/TRAINING PROGRAM

## 2021-11-04 PROCEDURE — 82306 VITAMIN D 25 HYDROXY: CPT | Performed by: STUDENT IN AN ORGANIZED HEALTH CARE EDUCATION/TRAINING PROGRAM

## 2021-11-04 PROCEDURE — 86592 SYPHILIS TEST NON-TREP QUAL: CPT | Performed by: STUDENT IN AN ORGANIZED HEALTH CARE EDUCATION/TRAINING PROGRAM

## 2021-11-04 PROCEDURE — 80061 LIPID PANEL: CPT | Performed by: STUDENT IN AN ORGANIZED HEALTH CARE EDUCATION/TRAINING PROGRAM

## 2021-11-04 RX ORDER — NYSTATIN 100000 U/G
OINTMENT TOPICAL 2 TIMES DAILY
Status: DISCONTINUED | OUTPATIENT
Start: 2021-11-04 | End: 2021-11-10 | Stop reason: HOSPADM

## 2021-11-04 RX ADMIN — HYDROXYZINE HYDROCHLORIDE 50 MG: 50 TABLET, FILM COATED ORAL at 17:01

## 2021-11-04 RX ADMIN — ESCITALOPRAM OXALATE 10 MG: 10 TABLET ORAL at 08:10

## 2021-11-04 RX ADMIN — Medication 3 MG: at 21:18

## 2021-11-04 RX ADMIN — NYSTATIN 1 APPLICATION: 100000 OINTMENT TOPICAL at 17:01

## 2021-11-04 RX ADMIN — QUETIAPINE FUMARATE 50 MG: 25 TABLET ORAL at 21:18

## 2021-11-05 PROCEDURE — 99232 SBSQ HOSP IP/OBS MODERATE 35: CPT | Performed by: STUDENT IN AN ORGANIZED HEALTH CARE EDUCATION/TRAINING PROGRAM

## 2021-11-05 RX ORDER — ESCITALOPRAM OXALATE 20 MG/1
20 TABLET ORAL DAILY
Status: DISCONTINUED | OUTPATIENT
Start: 2021-11-06 | End: 2021-11-10 | Stop reason: HOSPADM

## 2021-11-05 RX ORDER — BACITRACIN, NEOMYCIN, POLYMYXIN B 400; 3.5; 5 [USP'U]/G; MG/G; [USP'U]/G
1 OINTMENT TOPICAL 2 TIMES DAILY
Status: DISCONTINUED | OUTPATIENT
Start: 2021-11-05 | End: 2021-11-10 | Stop reason: HOSPADM

## 2021-11-05 RX ADMIN — Medication 3 MG: at 21:20

## 2021-11-05 RX ADMIN — BACITRACIN, NEOMYCIN, POLYMYXIN B 1 SMALL APPLICATION: 400; 3.5; 5 OINTMENT TOPICAL at 17:19

## 2021-11-05 RX ADMIN — QUETIAPINE FUMARATE 50 MG: 25 TABLET ORAL at 21:20

## 2021-11-05 RX ADMIN — ESCITALOPRAM OXALATE 10 MG: 10 TABLET ORAL at 08:12

## 2021-11-05 RX ADMIN — ALUMINUM HYDROXIDE, MAGNESIUM HYDROXIDE, AND SIMETHICONE 30 ML: 200; 200; 20 SUSPENSION ORAL at 10:12

## 2021-11-05 RX ADMIN — NYSTATIN 1 APPLICATION: 100000 OINTMENT TOPICAL at 17:20

## 2021-11-06 PROCEDURE — 99232 SBSQ HOSP IP/OBS MODERATE 35: CPT | Performed by: STUDENT IN AN ORGANIZED HEALTH CARE EDUCATION/TRAINING PROGRAM

## 2021-11-06 RX ADMIN — NYSTATIN: 100000 OINTMENT TOPICAL at 08:20

## 2021-11-06 RX ADMIN — ESCITALOPRAM OXALATE 20 MG: 20 TABLET ORAL at 08:20

## 2021-11-06 RX ADMIN — HYDROXYZINE HYDROCHLORIDE 100 MG: 50 TABLET, FILM COATED ORAL at 09:16

## 2021-11-06 RX ADMIN — Medication 3 MG: at 21:13

## 2021-11-06 RX ADMIN — NYSTATIN 1 APPLICATION: 100000 OINTMENT TOPICAL at 17:15

## 2021-11-06 RX ADMIN — BACITRACIN, NEOMYCIN, POLYMYXIN B 1 SMALL APPLICATION: 400; 3.5; 5 OINTMENT TOPICAL at 17:15

## 2021-11-06 RX ADMIN — QUETIAPINE FUMARATE 50 MG: 25 TABLET ORAL at 21:13

## 2021-11-06 RX ADMIN — BACITRACIN, NEOMYCIN, POLYMYXIN B 1 SMALL APPLICATION: 400; 3.5; 5 OINTMENT TOPICAL at 08:20

## 2021-11-07 PROCEDURE — 99232 SBSQ HOSP IP/OBS MODERATE 35: CPT | Performed by: STUDENT IN AN ORGANIZED HEALTH CARE EDUCATION/TRAINING PROGRAM

## 2021-11-07 RX ADMIN — QUETIAPINE FUMARATE 50 MG: 25 TABLET ORAL at 21:14

## 2021-11-07 RX ADMIN — HYDROXYZINE HYDROCHLORIDE 100 MG: 50 TABLET, FILM COATED ORAL at 08:10

## 2021-11-07 RX ADMIN — ESCITALOPRAM OXALATE 20 MG: 20 TABLET ORAL at 08:10

## 2021-11-07 RX ADMIN — NYSTATIN 1 APPLICATION: 100000 OINTMENT TOPICAL at 17:22

## 2021-11-07 RX ADMIN — BACITRACIN, NEOMYCIN, POLYMYXIN B 1 SMALL APPLICATION: 400; 3.5; 5 OINTMENT TOPICAL at 17:22

## 2021-11-07 RX ADMIN — Medication 3 MG: at 21:14

## 2021-11-07 RX ADMIN — NYSTATIN: 100000 OINTMENT TOPICAL at 08:10

## 2021-11-07 RX ADMIN — BACITRACIN, NEOMYCIN, POLYMYXIN B 1 SMALL APPLICATION: 400; 3.5; 5 OINTMENT TOPICAL at 08:11

## 2021-11-08 PROCEDURE — 99232 SBSQ HOSP IP/OBS MODERATE 35: CPT | Performed by: PHYSICIAN ASSISTANT

## 2021-11-08 RX ADMIN — QUETIAPINE FUMARATE 50 MG: 25 TABLET ORAL at 21:18

## 2021-11-08 RX ADMIN — Medication 3 MG: at 21:18

## 2021-11-08 RX ADMIN — BACITRACIN, NEOMYCIN, POLYMYXIN B 1 SMALL APPLICATION: 400; 3.5; 5 OINTMENT TOPICAL at 17:01

## 2021-11-08 RX ADMIN — ESCITALOPRAM OXALATE 20 MG: 20 TABLET ORAL at 09:40

## 2021-11-08 RX ADMIN — NYSTATIN: 100000 OINTMENT TOPICAL at 17:03

## 2021-11-09 PROBLEM — J40 BRONCHITIS: Status: RESOLVED | Noted: 2021-11-04 | Resolved: 2021-11-09

## 2021-11-09 PROBLEM — Z00.8 MEDICAL CLEARANCE FOR PSYCHIATRIC ADMISSION: Status: RESOLVED | Noted: 2021-11-04 | Resolved: 2021-11-09

## 2021-11-09 PROCEDURE — 99232 SBSQ HOSP IP/OBS MODERATE 35: CPT | Performed by: PHYSICIAN ASSISTANT

## 2021-11-09 RX ORDER — QUETIAPINE FUMARATE 100 MG/1
100 TABLET, FILM COATED ORAL
Status: DISCONTINUED | OUTPATIENT
Start: 2021-11-09 | End: 2021-11-10 | Stop reason: HOSPADM

## 2021-11-09 RX ADMIN — ESCITALOPRAM OXALATE 20 MG: 20 TABLET ORAL at 08:49

## 2021-11-09 RX ADMIN — QUETIAPINE FUMARATE 100 MG: 100 TABLET ORAL at 21:19

## 2021-11-09 RX ADMIN — BACITRACIN, NEOMYCIN, POLYMYXIN B 1 SMALL APPLICATION: 400; 3.5; 5 OINTMENT TOPICAL at 08:50

## 2021-11-09 RX ADMIN — BACITRACIN, NEOMYCIN, POLYMYXIN B 1 SMALL APPLICATION: 400; 3.5; 5 OINTMENT TOPICAL at 17:05

## 2021-11-09 RX ADMIN — Medication 3 MG: at 21:19

## 2021-11-09 RX ADMIN — NYSTATIN: 100000 OINTMENT TOPICAL at 17:05

## 2021-11-10 VITALS
BODY MASS INDEX: 33.83 KG/M2 | RESPIRATION RATE: 18 BRPM | HEIGHT: 65 IN | WEIGHT: 203.04 LBS | OXYGEN SATURATION: 96 % | DIASTOLIC BLOOD PRESSURE: 70 MMHG | HEART RATE: 60 BPM | SYSTOLIC BLOOD PRESSURE: 134 MMHG | TEMPERATURE: 97.7 F

## 2021-11-10 PROCEDURE — 99238 HOSP IP/OBS DSCHRG MGMT 30/<: CPT | Performed by: PSYCHIATRY & NEUROLOGY

## 2021-11-10 RX ORDER — NYSTATIN 100000 U/G
OINTMENT TOPICAL 2 TIMES DAILY
Qty: 30 G | Refills: 0 | Status: SHIPPED | OUTPATIENT
Start: 2021-11-10 | End: 2022-01-21

## 2021-11-10 RX ORDER — LANOLIN ALCOHOL/MO/W.PET/CERES
3 CREAM (GRAM) TOPICAL
Qty: 30 TABLET | Refills: 1 | Status: SHIPPED | OUTPATIENT
Start: 2021-11-10 | End: 2021-11-26 | Stop reason: ALTCHOICE

## 2021-11-10 RX ORDER — ESCITALOPRAM OXALATE 20 MG/1
20 TABLET ORAL DAILY
Qty: 30 TABLET | Refills: 1 | Status: SHIPPED | OUTPATIENT
Start: 2021-11-10 | End: 2021-11-18 | Stop reason: SDUPTHER

## 2021-11-10 RX ORDER — QUETIAPINE FUMARATE 100 MG/1
100 TABLET, FILM COATED ORAL
Qty: 30 TABLET | Refills: 1 | Status: SHIPPED | OUTPATIENT
Start: 2021-11-10 | End: 2021-11-11

## 2021-11-10 RX ORDER — NICOTINE 21 MG/24HR
1 PATCH, TRANSDERMAL 24 HOURS TRANSDERMAL DAILY
Qty: 28 PATCH | Refills: 0 | Status: SHIPPED | OUTPATIENT
Start: 2021-11-10 | End: 2021-11-11

## 2021-11-10 RX ADMIN — ESCITALOPRAM OXALATE 20 MG: 20 TABLET ORAL at 09:07

## 2021-11-11 ENCOUNTER — TELEMEDICINE (OUTPATIENT)
Dept: PSYCHIATRY | Facility: CLINIC | Age: 36
End: 2021-11-11
Payer: COMMERCIAL

## 2021-11-11 ENCOUNTER — OFFICE VISIT (OUTPATIENT)
Dept: PSYCHOLOGY | Facility: CLINIC | Age: 36
End: 2021-11-11
Payer: COMMERCIAL

## 2021-11-11 DIAGNOSIS — G47.00 INSOMNIA, UNSPECIFIED TYPE: ICD-10-CM

## 2021-11-11 DIAGNOSIS — F41.1 GENERALIZED ANXIETY DISORDER: ICD-10-CM

## 2021-11-11 DIAGNOSIS — F60.3 BORDERLINE PERSONALITY DISORDER (HCC): ICD-10-CM

## 2021-11-11 DIAGNOSIS — F33.2 SEVERE EPISODE OF RECURRENT MAJOR DEPRESSIVE DISORDER, WITHOUT PSYCHOTIC FEATURES (HCC): Primary | ICD-10-CM

## 2021-11-11 PROCEDURE — 90792 PSYCH DIAG EVAL W/MED SRVCS: CPT | Performed by: PSYCHIATRY & NEUROLOGY

## 2021-11-11 RX ORDER — TRAZODONE HYDROCHLORIDE 50 MG/1
TABLET ORAL
Qty: 30 TABLET | Refills: 1 | Status: SHIPPED | OUTPATIENT
Start: 2021-11-11 | End: 2021-11-26 | Stop reason: ALTCHOICE

## 2021-11-11 RX ORDER — BUSPIRONE HYDROCHLORIDE 10 MG/1
10 TABLET ORAL 2 TIMES DAILY
Qty: 60 TABLET | Refills: 1 | Status: SHIPPED | OUTPATIENT
Start: 2021-11-11 | End: 2021-12-09 | Stop reason: SDUPTHER

## 2021-11-11 RX ORDER — UBIDECARENONE 75 MG
1 CAPSULE ORAL EVERY 24 HOURS
COMMUNITY

## 2021-11-12 ENCOUNTER — OFFICE VISIT (OUTPATIENT)
Dept: PSYCHOLOGY | Facility: CLINIC | Age: 36
End: 2021-11-12
Payer: COMMERCIAL

## 2021-11-12 DIAGNOSIS — G47.00 INSOMNIA, UNSPECIFIED TYPE: ICD-10-CM

## 2021-11-12 DIAGNOSIS — F60.3 BORDERLINE PERSONALITY DISORDER (HCC): ICD-10-CM

## 2021-11-12 DIAGNOSIS — F33.2 SEVERE EPISODE OF RECURRENT MAJOR DEPRESSIVE DISORDER, WITHOUT PSYCHOTIC FEATURES (HCC): Primary | ICD-10-CM

## 2021-11-12 DIAGNOSIS — F41.1 GENERALIZED ANXIETY DISORDER: ICD-10-CM

## 2021-11-12 PROCEDURE — G0176 OPPS/PHP;ACTIVITY THERAPY: HCPCS

## 2021-11-12 PROCEDURE — G0177 OPPS/PHP; TRAIN & EDUC SERV: HCPCS

## 2021-11-12 PROCEDURE — G0410 GRP PSYCH PARTIAL HOSP 45-50: HCPCS

## 2021-11-15 ENCOUNTER — OFFICE VISIT (OUTPATIENT)
Dept: PSYCHOLOGY | Facility: CLINIC | Age: 36
End: 2021-11-15
Payer: COMMERCIAL

## 2021-11-15 DIAGNOSIS — F33.2 SEVERE EPISODE OF RECURRENT MAJOR DEPRESSIVE DISORDER, WITHOUT PSYCHOTIC FEATURES (HCC): Primary | ICD-10-CM

## 2021-11-15 DIAGNOSIS — F41.1 GENERALIZED ANXIETY DISORDER: ICD-10-CM

## 2021-11-15 DIAGNOSIS — G47.00 INSOMNIA, UNSPECIFIED TYPE: ICD-10-CM

## 2021-11-15 DIAGNOSIS — F60.3 BORDERLINE PERSONALITY DISORDER (HCC): ICD-10-CM

## 2021-11-15 PROCEDURE — G0176 OPPS/PHP;ACTIVITY THERAPY: HCPCS

## 2021-11-15 PROCEDURE — G0177 OPPS/PHP; TRAIN & EDUC SERV: HCPCS

## 2021-11-15 PROCEDURE — G0410 GRP PSYCH PARTIAL HOSP 45-50: HCPCS

## 2021-11-16 ENCOUNTER — OFFICE VISIT (OUTPATIENT)
Dept: PSYCHOLOGY | Facility: CLINIC | Age: 36
End: 2021-11-16
Payer: COMMERCIAL

## 2021-11-16 DIAGNOSIS — F41.1 GENERALIZED ANXIETY DISORDER: ICD-10-CM

## 2021-11-16 DIAGNOSIS — F60.3 BORDERLINE PERSONALITY DISORDER (HCC): ICD-10-CM

## 2021-11-16 DIAGNOSIS — G47.00 INSOMNIA, UNSPECIFIED TYPE: ICD-10-CM

## 2021-11-16 DIAGNOSIS — F33.2 SEVERE EPISODE OF RECURRENT MAJOR DEPRESSIVE DISORDER, WITHOUT PSYCHOTIC FEATURES (HCC): Primary | ICD-10-CM

## 2021-11-16 PROCEDURE — G0410 GRP PSYCH PARTIAL HOSP 45-50: HCPCS

## 2021-11-16 PROCEDURE — G0177 OPPS/PHP; TRAIN & EDUC SERV: HCPCS

## 2021-11-17 ENCOUNTER — OFFICE VISIT (OUTPATIENT)
Dept: PSYCHOLOGY | Facility: CLINIC | Age: 36
End: 2021-11-17
Payer: COMMERCIAL

## 2021-11-17 DIAGNOSIS — F41.1 GENERALIZED ANXIETY DISORDER: ICD-10-CM

## 2021-11-17 DIAGNOSIS — F33.2 SEVERE EPISODE OF RECURRENT MAJOR DEPRESSIVE DISORDER, WITHOUT PSYCHOTIC FEATURES (HCC): Primary | ICD-10-CM

## 2021-11-17 DIAGNOSIS — F60.3 BORDERLINE PERSONALITY DISORDER (HCC): ICD-10-CM

## 2021-11-17 PROCEDURE — G0176 OPPS/PHP;ACTIVITY THERAPY: HCPCS

## 2021-11-17 PROCEDURE — G0177 OPPS/PHP; TRAIN & EDUC SERV: HCPCS

## 2021-11-17 PROCEDURE — G0410 GRP PSYCH PARTIAL HOSP 45-50: HCPCS

## 2021-11-18 ENCOUNTER — TELEMEDICINE (OUTPATIENT)
Dept: PSYCHIATRY | Facility: CLINIC | Age: 36
End: 2021-11-18
Payer: COMMERCIAL

## 2021-11-18 ENCOUNTER — OFFICE VISIT (OUTPATIENT)
Dept: PSYCHOLOGY | Facility: CLINIC | Age: 36
End: 2021-11-18
Payer: COMMERCIAL

## 2021-11-18 DIAGNOSIS — F32.81 PMDD (PREMENSTRUAL DYSPHORIC DISORDER): ICD-10-CM

## 2021-11-18 DIAGNOSIS — F31.32 BIPOLAR AFFECTIVE DISORDER, CURRENTLY DEPRESSED, MODERATE (HCC): ICD-10-CM

## 2021-11-18 DIAGNOSIS — F41.1 GENERALIZED ANXIETY DISORDER: ICD-10-CM

## 2021-11-18 DIAGNOSIS — F33.2 SEVERE EPISODE OF RECURRENT MAJOR DEPRESSIVE DISORDER, WITHOUT PSYCHOTIC FEATURES (HCC): Primary | ICD-10-CM

## 2021-11-18 DIAGNOSIS — G47.00 INSOMNIA, UNSPECIFIED TYPE: ICD-10-CM

## 2021-11-18 DIAGNOSIS — F60.3 BORDERLINE PERSONALITY DISORDER (HCC): ICD-10-CM

## 2021-11-18 PROCEDURE — G0176 OPPS/PHP;ACTIVITY THERAPY: HCPCS

## 2021-11-18 PROCEDURE — G0410 GRP PSYCH PARTIAL HOSP 45-50: HCPCS

## 2021-11-18 PROCEDURE — 99212 OFFICE O/P EST SF 10 MIN: CPT | Performed by: PHYSICIAN ASSISTANT

## 2021-11-18 PROCEDURE — G0177 OPPS/PHP; TRAIN & EDUC SERV: HCPCS

## 2021-11-18 RX ORDER — ESCITALOPRAM OXALATE 20 MG/1
30 TABLET ORAL DAILY
COMMUNITY
Start: 2021-11-18 | End: 2021-12-01 | Stop reason: SDUPTHER

## 2021-11-19 ENCOUNTER — OFFICE VISIT (OUTPATIENT)
Dept: PSYCHOLOGY | Facility: CLINIC | Age: 36
End: 2021-11-19
Payer: COMMERCIAL

## 2021-11-19 DIAGNOSIS — F41.1 GENERALIZED ANXIETY DISORDER: ICD-10-CM

## 2021-11-19 DIAGNOSIS — F60.3 BORDERLINE PERSONALITY DISORDER (HCC): ICD-10-CM

## 2021-11-19 DIAGNOSIS — F32.81 PMDD (PREMENSTRUAL DYSPHORIC DISORDER): ICD-10-CM

## 2021-11-19 DIAGNOSIS — F33.2 SEVERE EPISODE OF RECURRENT MAJOR DEPRESSIVE DISORDER, WITHOUT PSYCHOTIC FEATURES (HCC): Primary | ICD-10-CM

## 2021-11-19 DIAGNOSIS — G47.00 INSOMNIA, UNSPECIFIED TYPE: ICD-10-CM

## 2021-11-19 PROCEDURE — G0176 OPPS/PHP;ACTIVITY THERAPY: HCPCS

## 2021-11-19 PROCEDURE — G0177 OPPS/PHP; TRAIN & EDUC SERV: HCPCS

## 2021-11-19 PROCEDURE — G0410 GRP PSYCH PARTIAL HOSP 45-50: HCPCS

## 2021-11-22 ENCOUNTER — OFFICE VISIT (OUTPATIENT)
Dept: PSYCHOLOGY | Facility: CLINIC | Age: 36
End: 2021-11-22
Payer: COMMERCIAL

## 2021-11-22 DIAGNOSIS — F41.1 GENERALIZED ANXIETY DISORDER: ICD-10-CM

## 2021-11-22 DIAGNOSIS — F33.2 SEVERE EPISODE OF RECURRENT MAJOR DEPRESSIVE DISORDER, WITHOUT PSYCHOTIC FEATURES (HCC): Primary | ICD-10-CM

## 2021-11-22 DIAGNOSIS — F32.81 PMDD (PREMENSTRUAL DYSPHORIC DISORDER): ICD-10-CM

## 2021-11-22 DIAGNOSIS — G47.00 INSOMNIA, UNSPECIFIED TYPE: ICD-10-CM

## 2021-11-22 DIAGNOSIS — F60.3 BORDERLINE PERSONALITY DISORDER (HCC): ICD-10-CM

## 2021-11-22 PROCEDURE — G0410 GRP PSYCH PARTIAL HOSP 45-50: HCPCS

## 2021-11-22 PROCEDURE — G0177 OPPS/PHP; TRAIN & EDUC SERV: HCPCS

## 2021-11-23 ENCOUNTER — TELEPHONE (OUTPATIENT)
Dept: PSYCHIATRY | Facility: CLINIC | Age: 36
End: 2021-11-23

## 2021-11-23 ENCOUNTER — OFFICE VISIT (OUTPATIENT)
Dept: PSYCHOLOGY | Facility: CLINIC | Age: 36
End: 2021-11-23
Payer: COMMERCIAL

## 2021-11-23 DIAGNOSIS — F33.2 SEVERE EPISODE OF RECURRENT MAJOR DEPRESSIVE DISORDER, WITHOUT PSYCHOTIC FEATURES (HCC): Primary | ICD-10-CM

## 2021-11-23 DIAGNOSIS — F41.1 GENERALIZED ANXIETY DISORDER: ICD-10-CM

## 2021-11-23 DIAGNOSIS — F60.3 BORDERLINE PERSONALITY DISORDER (HCC): ICD-10-CM

## 2021-11-23 DIAGNOSIS — G47.00 INSOMNIA, UNSPECIFIED TYPE: ICD-10-CM

## 2021-11-23 DIAGNOSIS — F32.81 PMDD (PREMENSTRUAL DYSPHORIC DISORDER): ICD-10-CM

## 2021-11-23 PROCEDURE — G0177 OPPS/PHP; TRAIN & EDUC SERV: HCPCS

## 2021-11-23 PROCEDURE — G0410 GRP PSYCH PARTIAL HOSP 45-50: HCPCS

## 2021-11-24 ENCOUNTER — OFFICE VISIT (OUTPATIENT)
Dept: PSYCHOLOGY | Facility: CLINIC | Age: 36
End: 2021-11-24
Payer: COMMERCIAL

## 2021-11-24 DIAGNOSIS — F41.1 GENERALIZED ANXIETY DISORDER: ICD-10-CM

## 2021-11-24 DIAGNOSIS — F33.2 SEVERE EPISODE OF RECURRENT MAJOR DEPRESSIVE DISORDER, WITHOUT PSYCHOTIC FEATURES (HCC): Primary | ICD-10-CM

## 2021-11-24 DIAGNOSIS — F60.3 BORDERLINE PERSONALITY DISORDER (HCC): ICD-10-CM

## 2021-11-24 DIAGNOSIS — F32.81 PMDD (PREMENSTRUAL DYSPHORIC DISORDER): ICD-10-CM

## 2021-11-24 DIAGNOSIS — G47.00 INSOMNIA, UNSPECIFIED TYPE: ICD-10-CM

## 2021-11-24 PROCEDURE — G0176 OPPS/PHP;ACTIVITY THERAPY: HCPCS

## 2021-11-24 PROCEDURE — G0177 OPPS/PHP; TRAIN & EDUC SERV: HCPCS

## 2021-11-24 PROCEDURE — G0410 GRP PSYCH PARTIAL HOSP 45-50: HCPCS

## 2021-11-26 ENCOUNTER — OFFICE VISIT (OUTPATIENT)
Dept: PSYCHOLOGY | Facility: CLINIC | Age: 36
End: 2021-11-26
Payer: COMMERCIAL

## 2021-11-26 ENCOUNTER — TELEMEDICINE (OUTPATIENT)
Dept: PSYCHIATRY | Facility: CLINIC | Age: 36
End: 2021-11-26
Payer: COMMERCIAL

## 2021-11-26 DIAGNOSIS — G47.00 INSOMNIA, UNSPECIFIED TYPE: ICD-10-CM

## 2021-11-26 DIAGNOSIS — F33.2 SEVERE EPISODE OF RECURRENT MAJOR DEPRESSIVE DISORDER, WITHOUT PSYCHOTIC FEATURES (HCC): Primary | ICD-10-CM

## 2021-11-26 DIAGNOSIS — F32.81 PMDD (PREMENSTRUAL DYSPHORIC DISORDER): ICD-10-CM

## 2021-11-26 DIAGNOSIS — F60.3 BORDERLINE PERSONALITY DISORDER (HCC): ICD-10-CM

## 2021-11-26 DIAGNOSIS — F41.1 GENERALIZED ANXIETY DISORDER: ICD-10-CM

## 2021-11-26 DIAGNOSIS — G47.00 INSOMNIA DISORDER WITH NON-SLEEP DISORDER MENTAL COMORBIDITY: ICD-10-CM

## 2021-11-26 PROCEDURE — 99212 OFFICE O/P EST SF 10 MIN: CPT | Performed by: PHYSICIAN ASSISTANT

## 2021-11-26 PROCEDURE — G0176 OPPS/PHP;ACTIVITY THERAPY: HCPCS

## 2021-11-26 PROCEDURE — G0410 GRP PSYCH PARTIAL HOSP 45-50: HCPCS

## 2021-11-26 PROCEDURE — G0177 OPPS/PHP; TRAIN & EDUC SERV: HCPCS

## 2021-11-26 RX ORDER — ESZOPICLONE 1 MG/1
TABLET, FILM COATED ORAL
Qty: 20 TABLET | Refills: 0 | Status: SHIPPED | OUTPATIENT
Start: 2021-11-26 | End: 2021-12-08 | Stop reason: SDUPTHER

## 2021-11-29 ENCOUNTER — OFFICE VISIT (OUTPATIENT)
Dept: PSYCHOLOGY | Facility: CLINIC | Age: 36
End: 2021-11-29
Payer: COMMERCIAL

## 2021-11-29 DIAGNOSIS — F33.2 SEVERE EPISODE OF RECURRENT MAJOR DEPRESSIVE DISORDER, WITHOUT PSYCHOTIC FEATURES (HCC): Primary | ICD-10-CM

## 2021-11-29 DIAGNOSIS — F32.81 PMDD (PREMENSTRUAL DYSPHORIC DISORDER): ICD-10-CM

## 2021-11-29 DIAGNOSIS — G47.00 INSOMNIA, UNSPECIFIED TYPE: ICD-10-CM

## 2021-11-29 DIAGNOSIS — F41.1 GENERALIZED ANXIETY DISORDER: ICD-10-CM

## 2021-11-29 DIAGNOSIS — F60.3 BORDERLINE PERSONALITY DISORDER (HCC): ICD-10-CM

## 2021-11-29 PROCEDURE — G0410 GRP PSYCH PARTIAL HOSP 45-50: HCPCS

## 2021-11-29 PROCEDURE — G0177 OPPS/PHP; TRAIN & EDUC SERV: HCPCS

## 2021-11-30 ENCOUNTER — OFFICE VISIT (OUTPATIENT)
Dept: PSYCHOLOGY | Facility: CLINIC | Age: 36
End: 2021-11-30
Payer: COMMERCIAL

## 2021-11-30 DIAGNOSIS — F41.1 GENERALIZED ANXIETY DISORDER: ICD-10-CM

## 2021-11-30 DIAGNOSIS — F33.2 SEVERE EPISODE OF RECURRENT MAJOR DEPRESSIVE DISORDER, WITHOUT PSYCHOTIC FEATURES (HCC): ICD-10-CM

## 2021-11-30 DIAGNOSIS — F32.81 PMDD (PREMENSTRUAL DYSPHORIC DISORDER): Primary | ICD-10-CM

## 2021-11-30 DIAGNOSIS — F60.3 BORDERLINE PERSONALITY DISORDER (HCC): ICD-10-CM

## 2021-11-30 PROCEDURE — G0410 GRP PSYCH PARTIAL HOSP 45-50: HCPCS

## 2021-11-30 PROCEDURE — G0176 OPPS/PHP;ACTIVITY THERAPY: HCPCS

## 2021-11-30 PROCEDURE — G0177 OPPS/PHP; TRAIN & EDUC SERV: HCPCS

## 2021-12-01 ENCOUNTER — TELEMEDICINE (OUTPATIENT)
Dept: PSYCHIATRY | Facility: CLINIC | Age: 36
End: 2021-12-01
Payer: COMMERCIAL

## 2021-12-01 ENCOUNTER — OFFICE VISIT (OUTPATIENT)
Dept: PSYCHOLOGY | Facility: CLINIC | Age: 36
End: 2021-12-01
Payer: COMMERCIAL

## 2021-12-01 DIAGNOSIS — G47.00 INSOMNIA, UNSPECIFIED TYPE: ICD-10-CM

## 2021-12-01 DIAGNOSIS — F32.81 PMDD (PREMENSTRUAL DYSPHORIC DISORDER): Primary | ICD-10-CM

## 2021-12-01 DIAGNOSIS — F41.1 GENERALIZED ANXIETY DISORDER: ICD-10-CM

## 2021-12-01 DIAGNOSIS — F60.3 BORDERLINE PERSONALITY DISORDER (HCC): ICD-10-CM

## 2021-12-01 DIAGNOSIS — F32.81 PMDD (PREMENSTRUAL DYSPHORIC DISORDER): ICD-10-CM

## 2021-12-01 DIAGNOSIS — F33.2 SEVERE EPISODE OF RECURRENT MAJOR DEPRESSIVE DISORDER, WITHOUT PSYCHOTIC FEATURES (HCC): Primary | ICD-10-CM

## 2021-12-01 DIAGNOSIS — F31.32 BIPOLAR AFFECTIVE DISORDER, CURRENTLY DEPRESSED, MODERATE (HCC): ICD-10-CM

## 2021-12-01 DIAGNOSIS — F33.2 SEVERE EPISODE OF RECURRENT MAJOR DEPRESSIVE DISORDER, WITHOUT PSYCHOTIC FEATURES (HCC): ICD-10-CM

## 2021-12-01 PROCEDURE — 99212 OFFICE O/P EST SF 10 MIN: CPT | Performed by: PHYSICIAN ASSISTANT

## 2021-12-01 PROCEDURE — G0410 GRP PSYCH PARTIAL HOSP 45-50: HCPCS

## 2021-12-01 PROCEDURE — G0177 OPPS/PHP; TRAIN & EDUC SERV: HCPCS

## 2021-12-01 PROCEDURE — G0176 OPPS/PHP;ACTIVITY THERAPY: HCPCS

## 2021-12-01 RX ORDER — ESCITALOPRAM OXALATE 10 MG/1
10 TABLET ORAL DAILY
Qty: 30 TABLET | Refills: 1 | Status: SHIPPED | OUTPATIENT
Start: 2021-12-01 | End: 2022-01-21

## 2021-12-01 RX ORDER — ESCITALOPRAM OXALATE 20 MG/1
20 TABLET ORAL DAILY
COMMUNITY
Start: 2021-12-01 | End: 2021-12-08 | Stop reason: SDUPTHER

## 2021-12-02 ENCOUNTER — OFFICE VISIT (OUTPATIENT)
Dept: PSYCHOLOGY | Facility: CLINIC | Age: 36
End: 2021-12-02
Payer: COMMERCIAL

## 2021-12-02 DIAGNOSIS — F41.1 GENERALIZED ANXIETY DISORDER: ICD-10-CM

## 2021-12-02 DIAGNOSIS — F32.81 PMDD (PREMENSTRUAL DYSPHORIC DISORDER): ICD-10-CM

## 2021-12-02 DIAGNOSIS — F60.3 BORDERLINE PERSONALITY DISORDER (HCC): ICD-10-CM

## 2021-12-02 DIAGNOSIS — G47.00 INSOMNIA, UNSPECIFIED TYPE: ICD-10-CM

## 2021-12-02 DIAGNOSIS — F33.2 SEVERE EPISODE OF RECURRENT MAJOR DEPRESSIVE DISORDER, WITHOUT PSYCHOTIC FEATURES (HCC): Primary | ICD-10-CM

## 2021-12-02 PROCEDURE — G0410 GRP PSYCH PARTIAL HOSP 45-50: HCPCS

## 2021-12-02 PROCEDURE — G0177 OPPS/PHP; TRAIN & EDUC SERV: HCPCS

## 2021-12-02 PROCEDURE — G0176 OPPS/PHP;ACTIVITY THERAPY: HCPCS

## 2021-12-03 ENCOUNTER — OFFICE VISIT (OUTPATIENT)
Dept: PSYCHOLOGY | Facility: CLINIC | Age: 36
End: 2021-12-03
Payer: COMMERCIAL

## 2021-12-03 DIAGNOSIS — F33.2 SEVERE EPISODE OF RECURRENT MAJOR DEPRESSIVE DISORDER, WITHOUT PSYCHOTIC FEATURES (HCC): Primary | ICD-10-CM

## 2021-12-03 DIAGNOSIS — F41.1 GENERALIZED ANXIETY DISORDER: ICD-10-CM

## 2021-12-03 DIAGNOSIS — G47.00 INSOMNIA, UNSPECIFIED TYPE: ICD-10-CM

## 2021-12-03 DIAGNOSIS — F32.81 PMDD (PREMENSTRUAL DYSPHORIC DISORDER): ICD-10-CM

## 2021-12-03 DIAGNOSIS — F60.3 BORDERLINE PERSONALITY DISORDER (HCC): ICD-10-CM

## 2021-12-03 PROCEDURE — G0410 GRP PSYCH PARTIAL HOSP 45-50: HCPCS

## 2021-12-03 PROCEDURE — G0177 OPPS/PHP; TRAIN & EDUC SERV: HCPCS

## 2021-12-06 ENCOUNTER — OFFICE VISIT (OUTPATIENT)
Dept: PSYCHOLOGY | Facility: CLINIC | Age: 36
End: 2021-12-06
Payer: COMMERCIAL

## 2021-12-06 DIAGNOSIS — F33.2 SEVERE EPISODE OF RECURRENT MAJOR DEPRESSIVE DISORDER, WITHOUT PSYCHOTIC FEATURES (HCC): Primary | ICD-10-CM

## 2021-12-06 DIAGNOSIS — F60.3 BORDERLINE PERSONALITY DISORDER (HCC): ICD-10-CM

## 2021-12-06 DIAGNOSIS — F32.81 PMDD (PREMENSTRUAL DYSPHORIC DISORDER): ICD-10-CM

## 2021-12-06 DIAGNOSIS — G47.00 INSOMNIA, UNSPECIFIED TYPE: ICD-10-CM

## 2021-12-06 DIAGNOSIS — F41.1 GENERALIZED ANXIETY DISORDER: ICD-10-CM

## 2021-12-06 PROCEDURE — G0410 GRP PSYCH PARTIAL HOSP 45-50: HCPCS

## 2021-12-06 PROCEDURE — G0177 OPPS/PHP; TRAIN & EDUC SERV: HCPCS

## 2021-12-07 ENCOUNTER — OFFICE VISIT (OUTPATIENT)
Dept: PSYCHOLOGY | Facility: CLINIC | Age: 36
End: 2021-12-07
Payer: COMMERCIAL

## 2021-12-07 DIAGNOSIS — F33.2 SEVERE EPISODE OF RECURRENT MAJOR DEPRESSIVE DISORDER, WITHOUT PSYCHOTIC FEATURES (HCC): ICD-10-CM

## 2021-12-07 DIAGNOSIS — F32.81 PMDD (PREMENSTRUAL DYSPHORIC DISORDER): Primary | ICD-10-CM

## 2021-12-07 DIAGNOSIS — G47.00 INSOMNIA, UNSPECIFIED TYPE: ICD-10-CM

## 2021-12-07 DIAGNOSIS — F60.3 BORDERLINE PERSONALITY DISORDER (HCC): ICD-10-CM

## 2021-12-07 DIAGNOSIS — F41.1 GENERALIZED ANXIETY DISORDER: ICD-10-CM

## 2021-12-07 PROCEDURE — G0177 OPPS/PHP; TRAIN & EDUC SERV: HCPCS

## 2021-12-07 PROCEDURE — G0176 OPPS/PHP;ACTIVITY THERAPY: HCPCS

## 2021-12-07 PROCEDURE — G0410 GRP PSYCH PARTIAL HOSP 45-50: HCPCS

## 2021-12-08 ENCOUNTER — OFFICE VISIT (OUTPATIENT)
Dept: PSYCHOLOGY | Facility: CLINIC | Age: 36
End: 2021-12-08
Payer: COMMERCIAL

## 2021-12-08 ENCOUNTER — TELEMEDICINE (OUTPATIENT)
Dept: PSYCHIATRY | Facility: CLINIC | Age: 36
End: 2021-12-08
Payer: COMMERCIAL

## 2021-12-08 DIAGNOSIS — F33.2 SEVERE EPISODE OF RECURRENT MAJOR DEPRESSIVE DISORDER, WITHOUT PSYCHOTIC FEATURES (HCC): Primary | ICD-10-CM

## 2021-12-08 DIAGNOSIS — F32.81 PMDD (PREMENSTRUAL DYSPHORIC DISORDER): ICD-10-CM

## 2021-12-08 DIAGNOSIS — G47.00 INSOMNIA, UNSPECIFIED TYPE: ICD-10-CM

## 2021-12-08 DIAGNOSIS — F31.32 BIPOLAR AFFECTIVE DISORDER, CURRENTLY DEPRESSED, MODERATE (HCC): ICD-10-CM

## 2021-12-08 DIAGNOSIS — F41.1 GENERALIZED ANXIETY DISORDER: ICD-10-CM

## 2021-12-08 DIAGNOSIS — F60.3 BORDERLINE PERSONALITY DISORDER (HCC): ICD-10-CM

## 2021-12-08 DIAGNOSIS — G47.00 INSOMNIA DISORDER WITH NON-SLEEP DISORDER MENTAL COMORBIDITY: ICD-10-CM

## 2021-12-08 PROCEDURE — G0177 OPPS/PHP; TRAIN & EDUC SERV: HCPCS

## 2021-12-08 PROCEDURE — 99212 OFFICE O/P EST SF 10 MIN: CPT | Performed by: PHYSICIAN ASSISTANT

## 2021-12-08 PROCEDURE — G0176 OPPS/PHP;ACTIVITY THERAPY: HCPCS

## 2021-12-08 PROCEDURE — G0410 GRP PSYCH PARTIAL HOSP 45-50: HCPCS

## 2021-12-08 RX ORDER — ESCITALOPRAM OXALATE 20 MG/1
20 TABLET ORAL DAILY
Qty: 30 TABLET | Refills: 0 | Status: SHIPPED | OUTPATIENT
Start: 2021-12-08 | End: 2022-02-09 | Stop reason: SDUPTHER

## 2021-12-08 RX ORDER — ESZOPICLONE 1 MG/1
TABLET, FILM COATED ORAL
Qty: 20 TABLET | Refills: 0 | Status: SHIPPED | OUTPATIENT
Start: 2021-12-08 | End: 2022-01-21

## 2021-12-09 ENCOUNTER — OFFICE VISIT (OUTPATIENT)
Dept: PSYCHOLOGY | Facility: CLINIC | Age: 36
End: 2021-12-09
Payer: COMMERCIAL

## 2021-12-09 DIAGNOSIS — F32.81 PMDD (PREMENSTRUAL DYSPHORIC DISORDER): ICD-10-CM

## 2021-12-09 DIAGNOSIS — F41.1 GENERALIZED ANXIETY DISORDER: ICD-10-CM

## 2021-12-09 DIAGNOSIS — F33.2 SEVERE EPISODE OF RECURRENT MAJOR DEPRESSIVE DISORDER, WITHOUT PSYCHOTIC FEATURES (HCC): Primary | ICD-10-CM

## 2021-12-09 DIAGNOSIS — F60.3 BORDERLINE PERSONALITY DISORDER (HCC): ICD-10-CM

## 2021-12-09 DIAGNOSIS — G47.00 INSOMNIA, UNSPECIFIED TYPE: ICD-10-CM

## 2021-12-09 PROCEDURE — G0176 OPPS/PHP;ACTIVITY THERAPY: HCPCS

## 2021-12-09 PROCEDURE — G0410 GRP PSYCH PARTIAL HOSP 45-50: HCPCS

## 2021-12-09 PROCEDURE — G0177 OPPS/PHP; TRAIN & EDUC SERV: HCPCS

## 2021-12-09 RX ORDER — BUSPIRONE HYDROCHLORIDE 10 MG/1
10 TABLET ORAL 2 TIMES DAILY
Qty: 60 TABLET | Refills: 1 | Status: SHIPPED | OUTPATIENT
Start: 2021-12-09 | End: 2022-01-21

## 2021-12-10 ENCOUNTER — OFFICE VISIT (OUTPATIENT)
Dept: PSYCHOLOGY | Facility: CLINIC | Age: 36
End: 2021-12-10
Payer: COMMERCIAL

## 2021-12-10 ENCOUNTER — DOCUMENTATION (OUTPATIENT)
Dept: PSYCHOLOGY | Facility: CLINIC | Age: 36
End: 2021-12-10

## 2021-12-10 DIAGNOSIS — G47.00 INSOMNIA, UNSPECIFIED TYPE: ICD-10-CM

## 2021-12-10 DIAGNOSIS — F41.1 GENERALIZED ANXIETY DISORDER: ICD-10-CM

## 2021-12-10 DIAGNOSIS — F33.2 SEVERE EPISODE OF RECURRENT MAJOR DEPRESSIVE DISORDER, WITHOUT PSYCHOTIC FEATURES (HCC): Primary | ICD-10-CM

## 2021-12-10 DIAGNOSIS — F60.3 BORDERLINE PERSONALITY DISORDER (HCC): ICD-10-CM

## 2021-12-10 DIAGNOSIS — F32.81 PMDD (PREMENSTRUAL DYSPHORIC DISORDER): ICD-10-CM

## 2021-12-10 PROCEDURE — G0176 OPPS/PHP;ACTIVITY THERAPY: HCPCS

## 2021-12-10 PROCEDURE — G0410 GRP PSYCH PARTIAL HOSP 45-50: HCPCS

## 2021-12-10 PROCEDURE — G0177 OPPS/PHP; TRAIN & EDUC SERV: HCPCS

## 2022-01-05 ENCOUNTER — HOSPITAL ENCOUNTER (EMERGENCY)
Facility: HOSPITAL | Age: 37
End: 2022-01-05
Attending: EMERGENCY MEDICINE
Payer: COMMERCIAL

## 2022-01-05 ENCOUNTER — DOCUMENTATION (OUTPATIENT)
Dept: PSYCHOLOGY | Facility: CLINIC | Age: 37
End: 2022-01-05

## 2022-01-05 VITALS
SYSTOLIC BLOOD PRESSURE: 129 MMHG | RESPIRATION RATE: 16 BRPM | TEMPERATURE: 97.8 F | DIASTOLIC BLOOD PRESSURE: 72 MMHG | OXYGEN SATURATION: 100 % | HEART RATE: 80 BPM

## 2022-01-05 DIAGNOSIS — R45.851 SUICIDAL IDEATION: Primary | ICD-10-CM

## 2022-01-05 DIAGNOSIS — R45.851 HAS ACCESS TO PLANNED MEANS OF SUICIDE: ICD-10-CM

## 2022-01-05 PROCEDURE — 80307 DRUG TEST PRSMV CHEM ANLYZR: CPT | Performed by: EMERGENCY MEDICINE

## 2022-01-05 PROCEDURE — 99285 EMERGENCY DEPT VISIT HI MDM: CPT

## 2022-01-05 PROCEDURE — 82075 ASSAY OF BREATH ETHANOL: CPT | Performed by: EMERGENCY MEDICINE

## 2022-01-05 PROCEDURE — 99285 EMERGENCY DEPT VISIT HI MDM: CPT | Performed by: EMERGENCY MEDICINE

## 2022-01-05 PROCEDURE — 0241U HB NFCT DS VIR RESP RNA 4 TRGT: CPT | Performed by: EMERGENCY MEDICINE

## 2022-01-05 PROCEDURE — 81025 URINE PREGNANCY TEST: CPT | Performed by: EMERGENCY MEDICINE

## 2022-01-05 RX ORDER — ESCITALOPRAM OXALATE 10 MG/1
5 TABLET ORAL DAILY
Status: DISCONTINUED | OUTPATIENT
Start: 2022-01-05 | End: 2022-01-05

## 2022-01-05 RX ORDER — LORAZEPAM 1 MG/1
1 TABLET ORAL EVERY 8 HOURS PRN
Status: DISCONTINUED | OUTPATIENT
Start: 2022-01-05 | End: 2022-01-05

## 2022-01-05 RX ORDER — LORAZEPAM 1 MG/1
1 TABLET ORAL EVERY 8 HOURS PRN
Status: DISCONTINUED | OUTPATIENT
Start: 2022-01-05 | End: 2022-01-06 | Stop reason: HOSPADM

## 2022-01-05 RX ORDER — BUSPIRONE HYDROCHLORIDE 10 MG/1
10 TABLET ORAL 2 TIMES DAILY
Status: DISCONTINUED | OUTPATIENT
Start: 2022-01-05 | End: 2022-01-06 | Stop reason: HOSPADM

## 2022-01-05 RX ORDER — ESCITALOPRAM OXALATE 10 MG/1
30 TABLET ORAL DAILY
Status: DISCONTINUED | OUTPATIENT
Start: 2022-01-06 | End: 2022-01-06 | Stop reason: HOSPADM

## 2022-01-05 RX ADMIN — BUSPIRONE HYDROCHLORIDE 10 MG: 10 TABLET ORAL at 19:00

## 2022-01-05 RX ADMIN — LORAZEPAM 1 MG: 1 TABLET ORAL at 16:14

## 2022-01-05 NOTE — PROGRESS NOTES
Subjective:     Patient ID: Tiffanie Castillo is a 43 y o  female  [facility] Clinical Progress Notes      Specialized Services Documentation  Therapist must complete separate progress note for each specific clinical activity in which the individual participated during the day  Case Management Note    NAVDEEP Dominguez    Current suicide risk : High (see risk assessment)     1120- Jackelyn called and left voicemail for this writer stating that she had suicidal thoughts and was unsure what to do  [id number]- This writer returned [employer] phone call  Robert Scott reported that she has been extremely anxious  On a scale of 0-10, 10 being the most anxious Robert Scott reported being at a 10  Robert Scott reported that she had SI on the weekend but was able to use her coping skills to work through it  On Monday she was triggered by the  asking her if she was ok  She thought maybe she needed medication adjustment that maybe they were not working as well  She attempted to reach out to [place] [id number] [facility] to move her medication management appointment closer but could not due to it being a new patient appointment  She talked with her therapist last night and created a safety plan  She then went home and wrote a suicide note  Her  found the note and they got into an argument  She did go into work this morning but developed a plan when she was at work to go to the bathroom and get a bag from the garbage and take it outside, sit down and suffocate herself  Robert Scott reported that she found a coworker to sit with her while she was having SI  This writer worked through previous coping skills that Robert Scott found helpful when she was in the program  Robert Scott reported having a hard time remembering her coping skills and thinking straight  Robert Scott reported reaching out to her  to come to her work but did not heard back from him at the time of our conversation   This writer spoke with Robert Scott and her coworker Charmayne Honey and informed Pepper Hernandez that crisis was going to be called and was recommended to go to inpatient  [phone]- This writer called and informed Crisis ([address]) and they recommended that Pepper Hernandez call 911 to get transportation to hospital  [phone]- This writer called Pepper David and Jackelyn's  Keyonna Peralta was present  Informed Keyonna Peralta and Pepper Hernandez of recommendation  Keyonna Peralta agreed to take Fishers[place] Hernandez to the emergency department  Pepper Hernandez agreed to going inpatient  Keyonna Peralta agreed to take her to [facility] IP     1200- This writer left voicemail for Inpatient Psychiatric intake line to inform them Pepper Hernandez was on her way in   [phone]- Spoke with Inpatient psychiatric intake line informed of situation  Medications changes/added/denied? NA    Treatment session number: NA    Individual Case Management Visit provided today?  Yes    [facility] follow up physician's orders: None at this time

## 2022-01-05 NOTE — ED NOTES
[facility]      Indy Leal RN  01/05/22 8103

## 2022-01-05 NOTE — ED PROVIDER NOTES
History  Chief Complaint   Patient presents with    Psychiatric Evaluation     SI with plan to sufficate self with bag at work behind her boss's truck  Arie MACIAS  Sarbjitnelida AH/VH  43year old female presents for evaluation of suicidal ideation with plan to suffocate herself  Patient states while she was at work today, she had the urge to grab a garbage bag and place it over her head behind a generator where her boss cutler his truck and she would be unlikely to be found by her coworkers  Patient states she thought that if that didn't work, she would hang herself from her 3rd floor balcony  Patient states she always has some suicidal thoughts, but she usually can ignore them  She feels that these suicidal thoughts have been worsening over the past 2 weeks  She had tried to make an appointment with a psychiatrist, but was unable to be seen quickly  She contacted her partial program who advised her to come to the ED  Patient had been admitted to inpatient psychiatry 2 months ago after a suicide attempt in which she attempted to suffocate herself with a garbage bag; however, she was interrupted by her  who took her to the ED  Patient states she had been started on medications during her admission, but does not feel that they are helping  History provided by:  Patient  Suicidal  Presenting symptoms: suicidal thoughts    Patient accompanied by:   Degree of incapacity (severity):  Severe  Onset quality:  Gradual  Duration:  2 weeks  Timing:  Constant  Progression:  Worsening  Chronicity:  New  Treatment compliance:   All of the time  Relieved by:  Nothing  Worsened by:  Nothing  Ineffective treatments:  Antidepressants and anti-anxiety medications  Associated symptoms: anxiety, feelings of worthlessness and insomnia    Associated symptoms: no abdominal pain, no chest pain and no headaches    Risk factors: hx of mental illness and hx of suicide attempts        Prior to Admission Medications Prescriptions Last Dose Informant Patient Reported? Taking? albuterol (PROVENTIL HFA,VENTOLIN HFA) 90 mcg/act inhaler   No No   Sig: Inhale 2 puffs every 4 (four) hours as needed for wheezing   busPIRone (BUSPAR) 10 mg tablet   No No   Sig: Take 1 tablet (10 mg total) by mouth 2 (two) times a day   cyanocobalamin (VITAMIN B-12) 100 mcg tablet   Yes No   Sig: Take 1 tablet by mouth every 24 hours   escitalopram (LEXAPRO) 20 mg tablet   No No   Sig: Take 1 tablet (20 mg total) by mouth daily With 10 mg   escitalopram (Lexapro) 10 mg tablet   No No   Sig: Take 1 tablet (10 mg total) by mouth daily With 20 mg   eszopiclone (LUNESTA) 1 mg tablet   No No   Si-2 po immediately before bed   nystatin (MYCOSTATIN) ointment Not Taking at Unknown time  No No   Sig: Apply topically 2 (two) times a day   Patient not taking: Reported on 2022       Facility-Administered Medications: None       Past Medical History:   Diagnosis Date    Anxiety     Bipolar disorder ([facility] )     Borderline personality disorder ([facility] )     Depression     Psychiatric illness     Self-injurious behavior     Suicide attempt ([facility] )        History reviewed  No pertinent surgical history  Family History   Problem Relation Age of Onset    Suicide Attempts Father     Self-Injury Father     Alcohol abuse Father     Depression Maternal Uncle     Depression Paternal Uncle     Alcohol abuse Brother     Depression Brother     Alcohol abuse Brother      I have reviewed and agree with the history as documented      E-Cigarette/Vaping    E-Cigarette Use Never User      E-Cigarette/Vaping Substances    Nicotine No     THC No     CBD No     Flavoring No     Other No     Unknown No      Social History     Tobacco Use    Smoking status: Current Every Day Smoker     Packs/day: 0 50    Smokeless tobacco: Never Used   Vaping Use    Vaping Use: Never used   Substance Use Topics    Alcohol use: Not Currently     Alcohol/week: 1 0 standard drink     Types: 1 Glasses of wine per week     Comment: once a month    Drug use: No       Review of Systems   Constitutional: Negative for chills and fever  HENT: Negative for congestion  Respiratory: Negative for cough and shortness of breath  Cardiovascular: Negative for chest pain  Gastrointestinal: Negative for abdominal pain, diarrhea, nausea and vomiting  Skin: Negative for rash and wound  Neurological: Negative for headaches  Psychiatric/Behavioral: Positive for sleep disturbance and suicidal ideas  The patient is nervous/anxious and has insomnia  All other systems reviewed and are negative  Physical Exam  Physical Exam  Vitals and nursing note reviewed  Constitutional:       General: She is not in acute distress  Appearance: She is well-developed  She is not toxic-appearing or diaphoretic  HENT:      Head: Normocephalic and atraumatic  Right Ear: External ear normal       Left Ear: External ear normal       Nose: Nose normal    Eyes:      General: No scleral icterus  Pulmonary:      Effort: Pulmonary effort is normal  No respiratory distress  Abdominal:      General: There is no distension  Musculoskeletal:         General: No deformity  Normal range of motion  Skin:     General: Skin is warm and dry  Findings: No rash  Neurological:      General: No focal deficit present  Mental Status: She is alert  Gait: Gait normal    Psychiatric:         Mood and Affect: Mood is anxious and depressed  Thought Content: Thought content includes suicidal ideation  Thought content does not include homicidal ideation  Thought content includes suicidal plan           Vital Signs  ED Triage Vitals [01/05/22 1304]   Temperature Pulse Respirations Blood Pressure SpO2   97 8 °F (36 6 °C) 80 16 129/72 100 %      Temp Source Heart Rate Source Patient Position - Orthostatic VS BP Location FiO2 (%)   Temporal Monitor Sitting Left arm --      Pain Score       No Pain           Vitals:    01/05/22 1304   BP: 129/72   Pulse: 80   Patient Position - Orthostatic VS: Sitting         Visual Acuity      ED Medications  Medications   busPIRone (BUSPAR) tablet 10 mg (has no administration in time range)   LORazepam (ATIVAN) tablet 1 mg (has no administration in time range)   escitalopram (LEXAPRO) tablet 30 mg (has no administration in time range)       Diagnostic Studies  Results Reviewed     Procedure Component Value Units Date/Time    COVID/FLU/RSV - 2 hour TAT [[id number]]  (Normal) Collected: 01/05/22 1320    Lab Status: Final result Specimen: Nares from Nose Updated: 01/05/22 1424     SARS-CoV-2 Negative     INFLUENZA A PCR Negative     INFLUENZA B PCR Negative     RSV PCR Negative    Narrative:      FOR PEDIATRIC PATIENTS - copy/paste COVID Guidelines URL to browser: [email or web address]    SARS-CoV-2 assay is a Nucleic Acid Amplification assay intended for the  qualitative detection of nucleic acid from SARS-CoV-2 in nasopharyngeal  swabs  Results are for the presumptive identification of SARS-CoV-2 RNA  Positive results are indicative of infection with SARS-CoV-2, the virus  causing COVID-19, but do not rule out bacterial infection or co-infection  with other viruses  Laboratories within the [place] and its  territories are required to report all positive results to the appropriate  public health authorities  Negative results do not preclude SARS-CoV-2  infection and should not be used as the sole basis for treatment or other  patient management decisions  Negative results must be combined with  clinical observations, patient history, and epidemiological information  This test has not been FDA cleared or approved  This test has been authorized by FDA under an Emergency Use Authorization  (EUA)   This test is only authorized for the duration of time the  declaration that circumstances exist justifying the authorization of the  emergency use of an in vitro diagnostic tests for detection of SARS-CoV-2  virus and/or diagnosis of COVID-19 infection under section 564(b)(1) of  the Act, 21 U  S C  034ITK-8(V)(5), unless the authorization is terminated  or revoked sooner  The test has been validated but independent review by FDA  and CLIA is pending  Test performed using [employer] Gene[employer]pert: This RT-PCR assay targets N2,  a region unique to SARS-CoV-2  A conserved region in the E-gene was chosen  for pan-Sarbecovirus detection which includes SARS-CoV-2  Rapid drug screen, urine [[id number]]  (Normal) Collected: 01/05/22 1320    Lab Status: Final result Specimen: Urine, Other Updated: 01/05/22 1336     Amph/Meth UR Negative     Barbiturate Ur Negative     Benzodiazepine Urine Negative     Cocaine Urine Negative     Methadone Urine Negative     Opiate Urine Negative     PCP Ur Negative     THC Urine Negative     Oxycodone Urine Negative    Narrative:      FOR MEDICAL PURPOSES ONLY  IF CONFIRMATION NEEDED PLEASE CONTACT THE LAB WITHIN 5 DAYS      Drug Screen Cutoff Levels:  AMPHETAMINE/METHAMPHETAMINES  1000 ng/mL  BARBITURATES     200 ng/mL  BENZODIAZEPINES     200 ng/mL  COCAINE      300 ng/mL  METHADONE      300 ng/mL  OPIATES      300 ng/mL  PHENCYCLIDINE     25 ng/mL  THC       50 ng/mL  OXYCODONE      100 ng/mL    POCT pregnancy, urine [[id number]]  (Normal) Resulted: 01/05/22 1335    Lab Status: Final result Updated: 01/05/22 1335     EXT PREG TEST UR (Ref: Negative) negative     Control valid    POCT alcohol breath test [[id number]]  (Normal) Resulted: 01/05/22 1318    Lab Status: Final result Updated: 01/05/22 1318     EXTBreath Alcohol 0 000                 No orders to display              Procedures  Procedures         ED Course  ED Course as of 01/05/22 1428   Wed Jan 05, 2022   1409 Patient signed 12                               SBIRT 22yo+      Most Recent Value   SBIRT (23 yo +)    In order to provide better care to our patients, we are screening all of our patients for alcohol and drug use  Would it be okay to ask you these screening questions? Yes Filed at: 01/05/2022 1330   Initial Alcohol Screen: US AUDIT-C     1  How often do you have a drink containing alcohol? 0 Filed at: 01/05/2022 1330   2  How many drinks containing alcohol do you have on a typical day you are drinking? 0 Filed at: 01/05/2022 1330   3a  Male UNDER 65: How often do you have five or more drinks on one occasion? 0 Filed at: 01/05/2022 1330   3b  FEMALE Any Age, or MALE 65+: How often do you have 4 or more drinks on one occassion? 0 Filed at: 01/05/2022 1330   Audit-C Score 0 Filed at: 01/05/2022 1330   JESSICA: How many times in the past year have you    Used an illegal drug or used a prescription medication for non-medical reasons? Never Filed at: 01/05/2022 1330                    MDM  Number of Diagnoses or Management Options  Has access to planned means of suicide: new and requires workup  Suicidal ideation: new and requires workup  Diagnosis management comments: 43year old female presents for evaluation of suicidal ideation with plan and prior similar attempt 2 months ago  Patient medically cleared  Crisis consulted  201 signed         Amount and/or Complexity of Data Reviewed  Clinical lab tests: ordered and reviewed    Patient Progress  Patient progress: stable      Disposition  Final diagnoses:   Suicidal ideation   Has access to planned means of suicide     Time reflects when diagnosis was documented in both MDM as applicable and the Disposition within this note     Time User Action Codes Description Comment    1/5/2022  1:47 PM Hi Flax Add [G22 101] Suicidal ideation     1/5/2022  1:47 PM Do Flax Add [S20 935] Has access to planned means of suicide       ED Disposition     ED Disposition Condition Date/Time Comment    Transfer to [facility] Jan 5, 2022  1:47 PM Lauro Dakin should be transferred out to placement pending and has been medically cleared  Follow-up Information    None         Patient's Medications   Discharge Prescriptions    No medications on file       No discharge procedures on file      PDMP Review       Value Time User    PDMP Reviewed  Yes 12/8/2021  8:07 AM Racquel Melendez PA-C          ED Provider  Electronically Signed by           Anna Brandt MD  01/05/22 1859

## 2022-01-05 NOTE — ED NOTES
Pt belongings secured and placed into [facility]  Pts partner has her cellphone/wallet       Javier Bar  01/05/22 0860

## 2022-01-05 NOTE — ED NOTES
Bed Search, no in-network beds    [place] - no beds  [facility]- clinical faxed  [facility]- no beds  [place] - clinical faxed  [facility]- clinical faxed  [facility] - clinical faxed  [facility]- [facility]  [facility]- no beds  [facility] - clinical faxed  [place] - left message  [facility] [place] [facility]  [place]- no beds

## 2022-01-05 NOTE — ED NOTES
Patient is accepted at [facility]  Patient is accepted by Dr Jennifer Tellez per Sterling Strickland  Transportation is arranged with [facility]  Transportation is scheduled for 1/5/22 @ 1930  Patient may go to the floor at  now        Nurse report is to be called to [phone] prior to patient transfer

## 2022-01-05 NOTE — ED NOTES
PT reported presenting problem on suicide ideation with plan  Pt reported plan of suffocating self with garbage bag and going behind employer car where it is isolated to commit act  Pt reported stressors of being hospitalized at [facility] in 11/2021 where she went from inpatient to partial to outpatient therapy  Pt also reported difficulties with sleep and that she currently takes LUNESTA  Pt reported falling asleep ok but struggles with middle of night waking and groggy mornings  Pt reported in the last two weeks a lack of mil[place] and happiness  Pt reported lack of desire to complete ADLS and left a suicide note yesterday for her   Pt reported that she is currently with [facility] and is awaiting medication management with [facility] psychiatrist   Pt denies medical, legal, and substance issues  Pt reported smoking half a pack of cigarettes a day  Pt denies homicidal, hallucinations or delusions  Pt was explained 201 and 72 hour notice  Pt signed 201

## 2022-01-06 NOTE — EMTALA/ACUTE CARE TRANSFER
[facility] EMERGENCY DEPARTMENT  [address]  [place] [facility] [address]  Dept: [phone]      EMTALA TRANSFER CONSENT    NAME Kashmir Sharma                                         1985                              MRN [id number]    I have been informed of my rights regarding examination, treatment, and transfer   by Dr Adamson att  providers found    Benefits: Specialized equipment and/or services available at the receiving facility (Include comment)________________________    Risks: Potential for delay in receiving treatment      Consent for Transfer:  I acknowledge that my medical condition has been evaluated and explained to me by the emergency department physician or other qualified medical person and/or my attending physician, who has recommended that I be transferred to the service of  Accepting Physician: Petrona Sahu at [address] Name, [address] : [place]  The above potential benefits of such transfer, the potential risks associated with such transfer, and the probable risks of not being transferred have been explained to me, and I fully understand them  The doctor has explained that, in my case, the benefits of transfer outweigh the risks  I agree to be transferred  I authorize the performance of emergency medical procedures and treatments upon me in both transit and upon arrival at the receiving facility  Additionally, I authorize the release of any and all medical records to the receiving facility and request they be transported with me, if possible  I understand that the safest mode of transportation during a medical emergency is an ambulance and that the Hospital advocates the use of this mode of transport  Risks of traveling to the receiving facility by car, including absence of medical control, life sustaining equipment, such as oxygen, and medical personnel has been explained to me and I fully understand them      ([place] BELOW)  [X]  I consent to the stated transfer and to be transported by ambulance/helicopter  [  ]  I consent to the stated transfer, but refuse transportation by ambulance and accept full responsibility for my transportation by car  I understand the risks of non-ambulance transfers and I exonerate the Hospital and its staff from any deterioration in my condition that results from this refusal     X___________________________________________    DATE  22  TIME________  Signature of patient or legally responsible individual signing on patient behalf           RELATIONSHIP TO PATIENT_________________________          Provider Certification    NAME Aaron Shelton                                         1985                              MRN [id number]    A medical screening exam was performed on the above named patient  Based on the examination:    Condition Necessitating Transfer The primary encounter diagnosis was Suicidal ideation  A diagnosis of Has access to planned means of suicide was also pertinent to this visit      Patient Condition: The patient has been stabilized such that within reasonable medical probability, no material deterioration of the patient condition or the condition of the unborn child(mily) is likely to result from the transfer    Reason for Transfer: Level of Care needed not available at this facility    Transfer Requirements: [facility]   · Space available and qualified personnel available for treatment as acknowledged by [employer]  · Agreed to accept transfer and to provide appropriate medical treatment as acknowledged by       Charley Gutierrez  · Appropriate medical records of the examination and treatment of the patient are provided at the time of transfer   [address], [address] _______  · Transfer will be performed by qualified personnel from [facility]  and appropriate transfer equipment as required, including the use of necessary and appropriate life support measures  Provider Certification: I have examined the patient and explained the following risks and benefits of being transferred/refusing transfer to the patient/family:  General risk, such as traffic hazards, adverse weather conditions, rough terrain or turbulence, possible failure of equipment (including vehicle or aircraft), or consequences of actions of persons outside the control of the transport personnel,Unanticipated needs of medical equipment and personnel during transport      Based on these reasonable risks and benefits to the patient and/or the unborn child(mily), and based upon the information available at the time of the patients examination, I certify that the medical benefits reasonably to be expected from the provision of appropriate medical treatments at another medical facility outweigh the increasing risks, if any, to the individuals medical condition, and in the case of labor to the unborn child, from effecting the transfer      X____________________________________________ DATE 01/05/22        TIME_______      ORIGINAL - SEND TO MEDICAL RECORDS   COPY - SEND WITH PATIENT DURING TRANSFER

## 2022-01-06 NOTE — ED NOTES
Insurance Authorization for admission:   Phone call placed to [facility]  Phone number: [phone]  Spoke to Niecy NAVARRETE     7 days approved  Level of care: Inpatient Behavioral Health  Review on 01/11/22  Authorization # [id number]

## 2022-01-10 ENCOUNTER — DOCUMENTATION (OUTPATIENT)
Dept: PSYCHOLOGY | Facility: CLINIC | Age: 37
End: 2022-01-10

## 2022-01-10 NOTE — PROGRESS NOTES
Subjective:     Patient ID: Darrell Lopez is a 43 y o  female  [facility] Clinical Progress Notes      Specialized Services Documentation  Therapist must complete separate progress note for each specific clinical activity in which the individual participated during the day  Case Management Note    Carolee Mcardle, MSW    Current suicide risk : Low     1-12- Mae Salazar, Jackelyn's , informed this writer of Jackelyn's care at [facility] and requested to know her Diagnosis when she discharged from [facility]  Requested phone number for [facility] Emergency Department  This writer gave him the phone number and diagnosis  Asked if he needs to inform her work and request short term disability or FMLA  Discussed her being readmitted to [facility] when she discharges from [facility]  Medications changes/added/denied? NA    Treatment session number: NA    Individual Case Management Visit provided today?      [facility] follow up physician's orders: NA

## 2022-01-21 ENCOUNTER — OFFICE VISIT (OUTPATIENT)
Dept: PSYCHOLOGY | Facility: CLINIC | Age: 37
End: 2022-01-21
Payer: COMMERCIAL

## 2022-01-21 ENCOUNTER — TELEMEDICINE (OUTPATIENT)
Dept: PSYCHIATRY | Facility: CLINIC | Age: 37
End: 2022-01-21
Payer: COMMERCIAL

## 2022-01-21 DIAGNOSIS — F33.2 SEVERE EPISODE OF RECURRENT MAJOR DEPRESSIVE DISORDER, WITHOUT PSYCHOTIC FEATURES (HCC): ICD-10-CM

## 2022-01-21 DIAGNOSIS — F41.1 GENERALIZED ANXIETY DISORDER: Primary | ICD-10-CM

## 2022-01-21 DIAGNOSIS — G47.00 INSOMNIA, UNSPECIFIED TYPE: ICD-10-CM

## 2022-01-21 PROCEDURE — 90792 PSYCH DIAG EVAL W/MED SRVCS: CPT | Performed by: PSYCHIATRY & NEUROLOGY

## 2022-01-21 PROCEDURE — 90791 PSYCH DIAGNOSTIC EVALUATION: CPT

## 2022-01-21 RX ORDER — ESZOPICLONE 2 MG/1
2 TABLET, FILM COATED ORAL
Qty: 30 TABLET | Refills: 1 | Status: SHIPPED | OUTPATIENT
Start: 2022-01-21 | End: 2022-02-09

## 2022-01-21 RX ORDER — BUSPIRONE HYDROCHLORIDE 30 MG/1
30 TABLET ORAL 2 TIMES DAILY
COMMUNITY
End: 2022-02-09 | Stop reason: SDUPTHER

## 2022-01-21 RX ORDER — QUETIAPINE FUMARATE 100 MG/1
100 TABLET, FILM COATED ORAL
COMMUNITY
End: 2022-02-09

## 2022-01-21 NOTE — PSYCH
Virtual Regular Visit    Verification of patient location:    Patient is located in the following state in which I hold an active license [place]      Assessment/Plan:    Problem List Items Addressed This Visit        Other    Severe episode of recurrent major depressive disorder, without psychotic features ([facility] )    Generalized anxiety disorder - Primary    Insomnia          Goals addressed in session:           Reason for visit is PHP VIRTUAL GROUP DUE TO COVID-19      Encounter provider [address] PARTIAL [address]    Provider located at [facility]  [address] [facility] [id number]      Recent Visits  No visits were found meeting these conditions  Showing recent visits within past 7 days and meeting all other requirements  Today's Visits  Date Type Provider Dept   01/21/22 Office Visit [address] PARTIAL PSYCH GROUP THERAPY [facility]   Showing today's visits and meeting all other requirements  Future Appointments  No visits were found meeting these conditions  Showing future appointments within next 150 days and meeting all other requirements       The patient was identified by name and date of birth  Aundrea Curtis was informed that this is a telemedicine visit and that the visit is being conducted throughMicrosoft Teams and patient was informed that this is a secure, HIPAA-compliant platform  She agrees to proceed     My office door was closed  No one else was in the room  She acknowledged consent and understanding of privacy and security of the video platform  The patient has agreed to participate and understands they can discontinue the visit at any time  Patient is aware this is a billable service  Subjective  Jackelyn Boo is a 43 y o  female         HPI     Past Medical History:   Diagnosis Date    Anxiety     Borderline personality disorder ([facility] )     Depression     Psychiatric illness     Self-injurious behavior     Suicide attempt ([facility] )        No past surgical history on file  Current Outpatient Medications   Medication Sig Dispense Refill    albuterol (PROVENTIL HFA,VENTOLIN HFA) 90 mcg/act inhaler Inhale 2 puffs every 4 (four) hours as needed for wheezing 8 g 0    busPIRone (BUSPAR) 30 MG tablet Take 30 mg by mouth 2 (two) times a day      cyanocobalamin (VITAMIN B-12) 100 mcg tablet Take 1 tablet by mouth every 24 hours      escitalopram (LEXAPRO) 20 mg tablet Take 1 tablet (20 mg total) by mouth daily With 10 mg 30 tablet 0    eszopiclone (LUNESTA) 2 mg tablet Take 1 tablet (2 mg total) by mouth daily at bedtime as needed for sleep Take immediately before bedtime 30 tablet 1    QUEtiapine (SEROquel) 100 mg tablet Take 100 mg by mouth daily at bedtime       No current facility-administered medications for this visit  Allergies   Allergen Reactions    Ceclor [Cefaclor] Hives    Nuts - Food Allergy GI Intolerance    Latex Rash       Review of Systems    Video Exam    There were no vitals filed for this visit  Physical Exam     I spent ONE GROUP HOURS PLUS CASE MANAGEMENT minutes with patient today in which greater than 50% of the time was spent in counseling/coordination of care regarding PHP - SEE NOTES  VIRTUAL VISIT DISCLAIMER    Edgar Ramirez verbally agrees to participate in [employer]  Pt is aware that [employer] could be limited without vital signs or the ability to perform a full hands-on physical exam  Jackelyn Ovalle understands she or the provider may request at any time to terminate the video visit and request the patient to seek care or treatment in person

## 2022-01-21 NOTE — PSYCH
Assessment/Plan:      Diagnoses and all orders for this visit:    Generalized anxiety disorder    Severe episode of recurrent major depressive disorder, without psychotic features ([facility] )    Insomnia, unspecified type          Subjective:     Patient ID: Aaron Shelton is a 43 y o  female  [facility] Treatment Plan   [facility] Treatment Plan   AREAS OF NEED: Anxiety, depression, and insomnia as evidenced by excessive worrying, panic attacks, heaviness on her chest, headaches, hyperventilating, racing heart, shakiness, difficulty sleeping at nights, over eating unhealthy foods, little happiness from doing scrapbooking, and feeling hopeless  due to chronic mental health issues, job stressors, and family stressors  Date Initiated: 01/21/22    Strengths: "organized, good at scrap booking, creative"     LONG TERM GOAL:   Date Initiated: 01/21/22  1 0  I will demonstrate and share two improvements in my emotional regulation skills during attendance to program    Target Date: 02/18/2022  Completion Date:       SHORT TERM OBJECTIVES:     Date Initiated: 01/21/22  1 1 I will recognize when I'm feeling overwhelmed and use identified coping skills to help decrease anxiety  Revision Date:   Target Date: 02/02/2022  Completion Date:     Date Initiated: 01/21/22  1 2 I will schedule daily calming/relaxation skill (e g , applied relaxation, progressive muscle relaxation, cue controlled relaxation; mindful breathing; biofeedback) into my routine to manage my anxiety symptoms  Revision Date:   Target Date: 02/02/2022  Completion Date:    Date Initiated: 01/21/22  1 3 I will take medications as prescribed and share questions and concerns if arise  Revision Date:  Target Date: 02/02/2022  Completion Date:     Date Initiated: 01/21/22  1 4 I will identify 3 ways my supports can assist in my recovery and agree to staff/support contact as indicated      Revision Date:  Target Date: 02/02/2022  Completion Date:          7 DAY REVISION:    Date Initiated:  Revision Date:   Target Date:   Completion Date:      PSYCHIATRY:  Date Initiated:  01/21/22  Medication Management and Education       Revision Date:       The person(s) responsible for carrying out the plan is Willian Magallon MD; Broderick Yang PA-C    NURSING/SYMPTOM EDUCATION:   Date Initiated: 01/21/22  1 1, 1 2  1 3, 1 4 This RN/Or Therapist will provide wellness/symptoms and skill education groups three to five days weekly to educate Kevin Link on signs and symptoms of diagnoses, skills to manage, and medication questions that will be addressed by the treatment team     Revision date: The person(s) responsible for carrying out the plan is Stepan Brown RN, BSN    PSYCHOLOGY:   Date Initiated: 01/21/22       1 1, 1 2, 1 4 Provide psychotherapy group 5 times per week to allow opportunity for Kevin Link  to explore stressors and ways of coping  Revision Date:   The person(s) responsible for carrying out the plan is NAVDEEP Turner,LSW    ALLIED THERAPY:   Date Initiated: 01/21/22  1 1,1 2 Engage Tayler JOHNSON in AT group 5 times weekly to encourage development and use of wellness tools to decrease symptoms and promote recovery through meaningful activity  Revision Date:       The person(s) responsible for carrying out the plan is PACHECO Stein     CASE MANAGEMENT:   Date Initiated: 01/21/22      1 0 This  will meet with Kevin Link  3-4 times weekly to assess treatment progress, discharge planning, connection to community supports and UR as indicated  Revision Date:   The person(s) responsible for carrying out the plan is David WHEELERN RN    TREATMENT REVIEW/COMMENTS:     DISCHARGE CRITERIA: Identify 3 signs of progress and complete relapse prevention plan  DISCHARGE PLAN: Connect with identified outpatient providers     Estimated Length of Stay: 10 treatment days           Diagnosis and Treatment Plan explained to Ples Needle relates understanding diagnosis and is agreeable to Treatment Plan           CLIENT COMMENTS / Please share your thoughts, feelings, need and/or experiences regarding your treatment plan: _____________________________________________________________________________________________________________________________________________________________________________________________________________________________________________________________________________________________________________________ Date/Time: ______________     Patient Signature: _________________________________     Date/Time: ______________      Signature: _________________________________     Date/Time: ______________
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
## 2022-01-21 NOTE — PSYCH
Subjective:     Patient ID: Micheline Sandoval is a 43 y o  female  [facility] Clinical Progress Notes      Specialized Services Documentation  Therapist must complete separate progress note for each specific clinical activity in which the individual participated during the day  Case Management Note    Joyce SHORE RN    Current suicide risk : med        [id number] Met with Micheline Sandoval via TEAMS  Reviewed program, initial paperwork reviewed: Consent for Treatment, PHP handbook, HIPPA, General Consent, Client Bill of Rights, and Smoking/Drug and Alcohol Policy  Release of Information obtained for emergency contact - Spouse Magan Prince at [phone] and PCP and Health Care Coordination Form  Micheline Sandoval has hard copies of all paperwork and verbally gave consent  Reviewed and given on call number  PCP notified of admission and health care coordination form sent  Completed initial psycho-social evaluation and initial treatment goals discussed  During intake at 1000 today Nico Holloway discussed her and her husbands concern regarding the discontinuation of Seroquel, they feel it helps with impulse control  At 1045 discussed with Dr Doyle Banda and her husbands concern of discontinuation of seroquel as it they feel it helps with impulse control  1412 Clarified medications with Dr Stan sim-  may continue with same dose of Seroquel,  Lunesta 2 mg daily at bedtime  If groggy the next day may take Lunesta 1mg daily at bedtime  Also clarified dosage of buspar and should be Buspar 30 mg twice a day  Discussed medication clarification with Micheline Sandoval and Chuck Pimentel her   Nico Holloway and Chuck Pimentel stated their comprehension  Medications changes/added/denied? Yes    Treatment session number: Assessment only    Individual Case Management Visit provided today?  yes    [facility]ations follow up physician's orders: Admit to PHP, Clarified medications with Dr Stan sim-  may continue with same dose of Seroquel,  Lunesta 2 mg daily at bedtime  If groggy the next day may take Lunesta 1mg daily at bedtime  Also clarified dosage of buspar and should be Buspar 30 mg twice a day    - See Dr Pineda Moment Note
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
## 2022-01-24 ENCOUNTER — OFFICE VISIT (OUTPATIENT)
Dept: PSYCHOLOGY | Facility: CLINIC | Age: 37
End: 2022-01-24
Payer: COMMERCIAL

## 2022-01-24 DIAGNOSIS — F41.1 GENERALIZED ANXIETY DISORDER: Primary | ICD-10-CM

## 2022-01-24 DIAGNOSIS — G47.00 INSOMNIA, UNSPECIFIED TYPE: ICD-10-CM

## 2022-01-24 DIAGNOSIS — F33.2 SEVERE EPISODE OF RECURRENT MAJOR DEPRESSIVE DISORDER, WITHOUT PSYCHOTIC FEATURES (HCC): ICD-10-CM

## 2022-01-24 PROCEDURE — G0177 OPPS/PHP; TRAIN & EDUC SERV: HCPCS

## 2022-01-24 PROCEDURE — G0176 OPPS/PHP;ACTIVITY THERAPY: HCPCS

## 2022-01-24 PROCEDURE — G0410 GRP PSYCH PARTIAL HOSP 45-50: HCPCS

## 2022-01-24 NOTE — PSYCH
Subjective:     Patient ID: Anna Bee is a 43 y o  female  In[facility]ations Clinical Progress Notes      Specialized Services Documentation  Therapist must complete separate progress note for each specific clinical activity in which the individual participated during the day  Group Psychotherapy Life Skills (3110-3904)  Anna Bee actively engaged in group focused on what to keep in their life and what to let go of which was facilitated virtually in a private office using HIPAA Compliant and Approved [employer] Teams  Anna Bee consented to the use of tele-video modality of treatment and was virtually present for group psychotherapy today  Group members ck an illustration of what they want to keep in their life and what they want to keep out of their life  Group members answered the following questions: 1  How strong is your boundary between what you want and don't want in your life and does that boundary need to change? 2  Which of the things have you already been able to work on and which ones would be new for you? 3  What will be the hardest to let go of or keep out of your life? 4  How will your life or emotional well-being change if you are able to keep or let go of these things? 5  What experience and or people have brought these things into your life? 6  What are the next steps you need to take to move towards having what you represented? Oren Johnny stated that the next step to move towards having what they represented in the illustration is to like herself more and make self-care a priority   Anna Bee continues to make progress towards goals through verbal participation in group; to accomplish long term goals continue to utilize skills learned in programming  Continue with psychotherapy to educate and encourage use of wellness tools   Tx Plan Objective: 1 1,1 2 Therapist: Ariel Solorzano

## 2022-01-24 NOTE — PSYCH
Subjective:     Patient ID: Rajesh Castro is a 43 y o  female  [facility]ations Clinical Progress Notes      Specialized Services Documentation  Therapist must complete separate progress note for each specific clinical activity in which the individual participated during the day  Group Psychotherapy  This group was facilitated virtually in a private office using HIPAA Compliant and Approved [employer] Teams  Rajesh Castro consented to the use of tele-video modality of treatment  ([id number]) Rajesh Castro attended group on the Wellness Recovery Action Plan  Group members were educated on the background of the WRAP  Members were informed WRAP was emailed to them this AM   Writer explained the benefit of utilizing the WRAP prior to members initiating it  Members then focused on the WRAP: the wellness toolbox, daily plan, identification of triggers and stressors  Next the group began developing action plans to respond to stressors and crisis situations  Group members shared pieces of information from these sections and identified their importance  Writer encouraged the members of group to continue utilizing the packet to develop plans inside and outside of program  The group then practiced "Progressive Muscle Relaxation" as something they could put in their toolbox  Good progress displayed through participation and engagement in topic  Treatment Plan Objectives: 1 1, 1 2  Therapist: Felisa SHORE RN    Other: [id number] Insurance [employer] contacted for authorization of treatment days, Policy Number [id number] for Dominick Link  Spoke with Marianne CAMPBELL  Requested 01/24/2022 through 02/21/2022 or 20 days treatment for PHP  Awaiting call back from clinical nurse  Case Management Note  This case management session was facilitated virtually in a private office using HIPAA Compliant and Approved [employer] Teams  Rajesh Castro consented to the use of tele-video modality of treatment  Radha Goodson RN, BSN    Current suicide risk : Moderate    [id number] Met with Adalberto Sharon  Reported she had a good weekend with her  cleaning and organizing  Progress noted in that Ishan Galindo identified the need to review her WRAP daily and have input from her  for "what it looks like when feeling well and what early warning signs would be"  Barriers continue to be irrational fears of related to work  Reviewed treatment plan with Ishan Galindo and she agreed to same  Medications changes/added/denied? No    Treatment session number: 1    Individual Case Management Visit provided today?  Yes     Innovations follow up physician's orders: No new orders

## 2022-01-24 NOTE — PSYCH
Virtual Regular Visit    Verification of patient location:    Patient is located in the following state in which I hold an active license [place]      Assessment/Plan:    Problem List Items Addressed This Visit        Other    Severe episode of recurrent major depressive disorder, without psychotic features ([facility] [address] )    Generalized anxiety disorder - Primary    Insomnia          Goals addressed in session:          Reason for visit is PHP VIRTUAL GROUP DUE TO COVID-19       Encounter provider [facility] [address]    Provider located at [address] [place] [id number]      Recent Visits  Date Type Provider Dept   01/21/22 Office Visit [facility] PSYCH GROUP THERAPY [facility]   Showing recent visits within past 7 days and meeting all other requirements  Today's Visits  Date Type Provider Dept   01/24/22 Office Visit [facility] PSYCH GROUP THERAPY [facility]   Showing today's visits and meeting all other requirements  Future Appointments  No visits were found meeting these conditions  Showing future appointments within next 150 days and meeting all other requirements       The patient was identified by name and date of birth  Yehuda Bryson was informed that this is a telemedicine visit and that the visit is being conducted throughMicrosoft Teams and patient was informed that this is a secure, HIPAA-compliant platform  She agrees to proceed     My office door was closed  No one else was in the room  She acknowledged consent and understanding of privacy and security of the video platform  The patient has agreed to participate and understands they can discontinue the visit at any time  Patient is aware this is a billable service  Mildred  Jackelyn Soliman is a 43 y o  female          HPI     Past Medical History:   Diagnosis Date    Anxiety     Borderline personality disorder ([facility] )     Depression     Psychiatric illness     Self-injurious behavior     Suicide attempt ([facility] )        No past surgical history on file  Current Outpatient Medications   Medication Sig Dispense Refill    albuterol (PROVENTIL HFA,VENTOLIN HFA) 90 mcg/act inhaler Inhale 2 puffs every 4 (four) hours as needed for wheezing 8 g 0    busPIRone (BUSPAR) 30 MG tablet Take 30 mg by mouth 2 (two) times a day      cyanocobalamin (VITAMIN B-12) 100 mcg tablet Take 1 tablet by mouth every 24 hours      escitalopram (LEXAPRO) 20 mg tablet Take 1 tablet (20 mg total) by mouth daily With 10 mg 30 tablet 0    eszopiclone (LUNESTA) 2 mg tablet Take 1 tablet (2 mg total) by mouth daily at bedtime as needed for sleep Take immediately before bedtime 30 tablet 1    QUEtiapine (SEROquel) 100 mg tablet Take 100 mg by mouth daily at bedtime       No current facility-administered medications for this visit  Allergies   Allergen Reactions    Ceclor [Cefaclor] Hives    Nuts - Food Allergy GI Intolerance    Latex Rash       Review of Systems    Video Exam    There were no vitals filed for this visit  Physical Exam     I spent FOUR GROUP HOURS PLUS CASE MANAGEMENT minutes with patient today in which greater than 50% of the time was spent in counseling/coordination of care regarding PHP - SEE NOTES  VIRTUAL VISIT DISCLAIMER    Aundrea Curtis verbally agrees to participate in [employer]  Pt is aware that [employer] could be limited without vital signs or the ability to perform a full hands-on physical exam  Jackelyn Boo understands she or the provider may request at any time to terminate the video visit and request the patient to seek care or treatment in person

## 2022-01-24 NOTE — PSYCH
Subjective:     Patient ID: Judy Thakkar is a 43 y o  female  [facility]ations Clinical Progress Notes      Specialized Services Documentation  Therapist must complete separate progress note for each specific clinical activity in which the individual participated during the day  Allied Therapy   Group was facilitated virtually in a private office using HIPAA compliant and approved [employer] Teams  Judy Thakkar  consented to the use of tele-video modality of treatment and was virtually present for group allied therapy  [id number] Judy Thakkar  actively shared in [facility] group focused on self-awareness  ORSHANAE was observed to be engaged in therapist led free writing, drawing to music, and designing a CD cover with songs about their story  Group discussed events that might make them feel a certain way and gain insight on how to control their behavior  ORLÉANS identified drawing to music as a tool they would use to identify how they're feeling  Good effort noted toward treatment goal  Continue AT to encourage development and practice of being self-aware  Tx Plan Objective: 1 1,1 2,1 4, Therapist:  Farzana BERGMAN; GALLO Baltazar    Education Therapy   [id number] Andrew JOHNSON actively shared in morning assessment and goal review  Presented as Receptive related to readiness to learn  Judy Thakkar did not complete goal from last treatment day as today was her first treatment day  ORSHANAE did not present with any barriers to learning  [address] engaged throughout the treatment day  Was engaged in learning related to Illness, Medication, Aftercare and Wellness Tools  Staff utilized Verbal, Written, A/V and Demonstration teaching methods  Judy Thakkar shared area of learning and set a goal for outside of program to print out a WRAP        Tx Plan Objective: 1 1,1 2,1 4, Therapist:  NAVDEEP Jean Baptiste; GALLO Baltazar

## 2022-01-25 ENCOUNTER — OFFICE VISIT (OUTPATIENT)
Dept: PSYCHOLOGY | Facility: CLINIC | Age: 37
End: 2022-01-25
Payer: COMMERCIAL

## 2022-01-25 DIAGNOSIS — F33.2 SEVERE EPISODE OF RECURRENT MAJOR DEPRESSIVE DISORDER, WITHOUT PSYCHOTIC FEATURES (HCC): Primary | ICD-10-CM

## 2022-01-25 DIAGNOSIS — F60.3 BORDERLINE PERSONALITY DISORDER (HCC): ICD-10-CM

## 2022-01-25 DIAGNOSIS — F41.1 GENERALIZED ANXIETY DISORDER: ICD-10-CM

## 2022-01-25 DIAGNOSIS — G47.00 INSOMNIA, UNSPECIFIED TYPE: ICD-10-CM

## 2022-01-25 DIAGNOSIS — F32.81 PMDD (PREMENSTRUAL DYSPHORIC DISORDER): ICD-10-CM

## 2022-01-25 PROCEDURE — G0410 GRP PSYCH PARTIAL HOSP 45-50: HCPCS

## 2022-01-25 PROCEDURE — G0177 OPPS/PHP; TRAIN & EDUC SERV: HCPCS

## 2022-01-25 PROCEDURE — G0176 OPPS/PHP;ACTIVITY THERAPY: HCPCS

## 2022-01-25 NOTE — PSYCH
Subjective:     Patient ID: Rayna Cheung is a 43 y o  female  [facility]ations Clinical Progress Notes      Specialized Services Documentation  Therapist must complete separate progress note for each specific clinical activity in which the individual participated during the day  Group Psychotherapy ([id number]) Carmen Pabon engaged in an open-discussion process group  The group opened up with the prompt question of Where would you rate yourself regarding your wellness journey  Rating yourself anywhere from a 0-10  Then the group talked about what has been working and what hasnt been working in regards to applying skills learned from [facility] will continue with life skills and psychotherapy groups  Carmen Pabon was attentive but did not share during group  Some progress made towards treatment   Tx Plan Objective: 1 1,1 2 Therapist:  LIAM Camara

## 2022-01-25 NOTE — PSYCH
Subjective:     Patient ID: Tiffany Musa is a 43 y o  female  [facility] Clinical Progress Notes      Specialized Services Documentation  Therapist must complete separate progress note for each specific clinical activity in which the individual participated during the day  Case Management Note    Joyce SHORE RN    Current suicide risk : Low     A case management session is not scheduled today with Tiffany Musa ; additionally, they did not request a CM meeting  Next scheduled session is Wednesday 01/26/2022     Claudia Dixon changes/added/denied? No    Treatment session number: 2    Individual Case Management Visit provided today?  No    Innovations follow up physician's orders: None at this time

## 2022-01-25 NOTE — PSYCH
Subjective:     Patient ID: Mayte Glass is a 43 y o  female  [facility]ations Clinical Progress Notes      Specialized Services Documentation  Therapist must complete separate progress note for each specific clinical activity in which the individual participated during the day  Group Psychotherapy Life Skills ([id number]) Mayte Glass actively engaged in group focused on positive affirmations which was facilitated virtually in a private office using HIPAA Compliant and Approved [employer] Teams  Rocio Sanchez consented to the use of tele-video modality of treatment and was virtually present for group psychotherapy today  Group members discussed the difference between healthy self-esteem and being overly confident  The group focused on acknowledging and accepting positive qualities in front of peers  During the activity group members stated 3 positive affirmations of themselves  Group members processed the activity by discussing their experience when doing this exercise  We talked about the reasons why it is difficult to acknowledge our positive qualities  We discussed how they could use positive affirmations in their personal life  Rocio Sanchez stated that a positive affirmation that they would use is, " I am enough"  Rocio Sanchez continues to make progress towards goals through verbal participation in group; to accomplish long term goals continue to utilize skills learned in programming  Continue with psychotherapy to educate and encourage use of wellness tools   Tx Plan Objective: 1 1,1 2 Therapist: Tony Watts

## 2022-01-25 NOTE — PSYCH
Virtual Regular Visit    Verification of patient location:    Patient is located in the following state in which I hold an active license [place]      Assessment/Plan:    Problem List Items Addressed This Visit        Other    Severe episode of recurrent major depressive disorder, without psychotic features ([facility] ) - Primary    Generalized anxiety disorder    Borderline personality disorder ([facility] )    PMDD (premenstrual dysphoric disorder)    Insomnia          Goals addressed in session:          Reason for visit is PHP VIRTUAL GROUP DUE TO COVID-19       Encounter provider [address] PARTIAL [address]    Provider located at [address] [place] [address]  [address] [facility] [address]      Recent Visits  Date Type Provider Dept   01/24/22 Office Visit [address] PARTIAL PSYCH GROUP THERAPY [facility]   01/21/22 Office Visit [address] PARTIAL PSYCH GROUP THERAPY [facility]   Showing recent visits within past 7 days and meeting all other requirements  Today's Visits  Date Type Provider Dept   01/25/22 Office Visit [address] PARTIAL PSYCH GROUP THERAPY [facility]   Showing today's visits and meeting all other requirements  Future Appointments  No visits were found meeting these conditions  Showing future appointments within next 150 days and meeting all other requirements       The patient was identified by name and date of birth  Rayna Cheung was informed that this is a telemedicine visit and that the visit is being conducted throughMicrosoft Teams and patient was informed that this is a secure, HIPAA-compliant platform  She agrees to proceed     My office door was closed  No one else was in the room  She acknowledged consent and understanding of privacy and security of the video platform  The patient has agreed to participate and understands they can discontinue the visit at any time  Patient is aware this is a billable service  Subjective  Jackelyn De Leon is a 43 y o  female    HPI     Past Medical History:   Diagnosis Date    Anxiety     Borderline [facility] disorder ([facility] )     Depression     Psychiatric illness     Self-injurious behavior     [facility]        No past surgical history on file  Current Outpatient Medications   Medication Sig Dispense Refill    albuterol (PROVENTIL HFA,VENTOLIN HFA) 90 mcg/act inhaler Inhale 2 puffs every 4 (four) hours as needed for wheezing 8 g 0    busPIRone (BUSPAR) 30 MG tablet Take 30 mg by mouth 2 (two) times a day      cyanocobalamin (VITAMIN B-12) 100 mcg tablet Take 1 tablet by mouth every 24 hours      escitalopram (LEXAPRO) 20 mg tablet Take 1 tablet (20 mg total) by mouth daily With 10 mg 30 tablet 0    eszopiclone (LUNESTA) 2 mg tablet Take 1 tablet (2 mg total) by mouth daily at bedtime as needed for sleep Take immediately before bedtime 30 tablet 1    QUEtiapine (SEROquel) 100 mg tablet Take 100 mg by mouth daily at bedtime       No current facility-administered medications for this visit  Allergies   Allergen Reactions    Ceclor [Cefaclor] Hives    Nuts - Food Allergy GI Intolerance    Latex Rash       Review of Systems    Video Exam    There were no vitals filed for this visit  Physical Exam     I spent FOUR GROUP HOURS PLUS CASE MANAGEMENT minutes with patient today in which greater than 50% of the time was spent in counseling/coordination of care regarding PHP - SEE NOTES  VIRTUAL VISIT DISCLAIMER    Rajesh Castro verbally agrees to participate in [employer]  Pt is aware that [employer] could be limited without vital signs or the ability to perform a full hands-on physical exam  Jackelyn De Leon understands she or the provider may request at any time to terminate the video visit and request the patient to seek care or treatment in person

## 2022-01-25 NOTE — PSYCH
Subjective:     Patient ID: Venancio Adam is a 43 y o  female  [facility] Clinical Progress Notes      Specialized Services Documentation  Therapist must complete separate progress note for each specific clinical activity in which the individual participated during the day  Allied Therapy   Group was facilitated virtually in a private office using HIPAA compliant and approved Microsoft Teams  Venancio Aadm consented to the use of tele-video modality of treatment and was virtually present for group allied therapy  [id number] Venancio Adam  actively shared in [facility] group focused on triggers and warning signs  Attila Nance was observed to be engaged in therapist led discussion on how these present themselves, recognizing physical and mental effects, and learning coping strategies when they do arise  Group engaged in a darrian analysis and identified a ten minute meditation or listening to music as a coping skill they could use  Good effort noted toward treatment goal  Continue AT to encourage development and practice of recognizing and coping with triggers and warning signs  Tx Plan Objective: 1 1,1 2,1 4, Therapist:  Jose Elias JOHNSONBC; GALLO Layton  Education Therapy   [id number] Balaji JOHNSON actively shared in morning assessment and goal review  Presented as Receptive related to readiness to learn  Venancio Adam did not complete goal from last treatment day identifying hoping to gain responsibility and education  Attila Nance did not present with any barriers to learning  [address] engaged throughout the treatment day  Was engaged in learning related to Illness, Medication, Aftercare and Wellness Tools  Staff utilized Verbal, Written, A/V and Demonstration teaching methods  Venancoi Adam shared area of learning and set a goal for outside of program to print her WRAP        Tx Plan Objective: 1 1,1 2,1 4, Therapist:  NAVDEEP Liu; Shantelle Jose Cathie Salas

## 2022-01-26 ENCOUNTER — OFFICE VISIT (OUTPATIENT)
Dept: PSYCHOLOGY | Facility: CLINIC | Age: 37
End: 2022-01-26
Payer: COMMERCIAL

## 2022-01-26 ENCOUNTER — TELEMEDICINE (OUTPATIENT)
Dept: PSYCHIATRY | Facility: CLINIC | Age: 37
End: 2022-01-26
Payer: COMMERCIAL

## 2022-01-26 DIAGNOSIS — F41.1 GENERALIZED ANXIETY DISORDER: ICD-10-CM

## 2022-01-26 DIAGNOSIS — F33.2 SEVERE EPISODE OF RECURRENT MAJOR DEPRESSIVE DISORDER, WITHOUT PSYCHOTIC FEATURES (HCC): Primary | ICD-10-CM

## 2022-01-26 DIAGNOSIS — G47.00 INSOMNIA, UNSPECIFIED TYPE: ICD-10-CM

## 2022-01-26 DIAGNOSIS — F60.3 BORDERLINE PERSONALITY DISORDER (HCC): ICD-10-CM

## 2022-01-26 DIAGNOSIS — F32.81 PMDD (PREMENSTRUAL DYSPHORIC DISORDER): ICD-10-CM

## 2022-01-26 PROCEDURE — 99212 OFFICE O/P EST SF 10 MIN: CPT | Performed by: PHYSICIAN ASSISTANT

## 2022-01-26 PROCEDURE — G0177 OPPS/PHP; TRAIN & EDUC SERV: HCPCS

## 2022-01-26 PROCEDURE — G0410 GRP PSYCH PARTIAL HOSP 45-50: HCPCS

## 2022-01-26 PROCEDURE — G0176 OPPS/PHP;ACTIVITY THERAPY: HCPCS

## 2022-01-26 NOTE — PSYCH
Subjective:     Patient ID: Wilfredo Shea is a 43 y o  female  [facility] Clinical Progress Notes      Specialized Services Documentation  Therapist must complete separate progress note for each specific clinical activity in which the individual participated during the day  Allied Therapy   Group was facilitated virtually in a private office using HIPAA compliant and approved [employer] Teams  Wilfredo Shea  consented to the use of tele-video modality of treatment and was virtually present for group allied therapy  [id number] Wilfredo Shea  shared in [facility] group focused on identifying a song that related to their mental health journey  [facility] identified "Love Me" by Mega Rebolledo as a song that described their current mental health  [facility] stated that she used this song as a mantra for affirmations, and would say statements such as "I can do this" and "I love myself"  Good progress noted toward treatment goals  Continue AT to practice and understand skills  Tx Plan Objective: 1 1,1 2,1 4, Therapist:  Talib Guajardo MT-BC; GALLO Moreira    Education Therapy   [id number] Marcela JOHNSON actively shared in morning assessment and goal review  Presented as Receptive related to readiness to learn  Wilfredo Shea did complete goal from last treatment day identifying gaining advocacy and education  [facility] did not present with any barriers to learning  [address] engaged throughout the treatment day  Was engaged in learning related to Illness, Medication, Aftercare and Wellness Tools  Staff utilized Verbal, Written, A/V and Demonstration teaching methods  Wilfredo Shea shared area of learning and set a goal for outside of program to mindfully fill out paperwork        Tx Plan Objective: 1 1,1 2,1 4, Therapist:  Doris Milligan, RN, BSN; GALLO Moreira

## 2022-01-26 NOTE — PSYCH
Virtual Regular Visit    Verification of patient location:    Patient is located in the following state in which I hold an active license [place]      Assessment/Plan:    Problem List Items Addressed This Visit        Other    Severe episode of recurrent major depressive disorder, without psychotic features ([facility] [address] ) - Primary    Generalized anxiety disorder    Borderline personality disorder ([facility] [address] )    PMDD (premenstrual dysphoric disorder)    Insomnia          Goals addressed in session: Goal 1          Reason for visit is virtual PHP due to covid  Encounter provider [facility] PARTIAL PSYCH PROGRAM    Provider located at [address] [place] [address]   [address] [facility] [id number]      Recent Visits  Date Type Provider Dept   01/25/22 Office Visit [facility] PARTIAL PSYCH GROUP THERAPY [facility]   01/24/22 Office Visit [facility] PARTIAL PSYCH GROUP THERAPY [facility]   01/21/22 Office Visit [facility] PARTIAL PSYCH GROUP THERAPY [facility]   Showing recent visits within past 7 days and meeting all other requirements  Today's Visits  Date Type Provider Dept   01/26/22 Office Visit [facility] PARTIAL PSYCH GROUP THERAPY [facility]   Showing today's visits and meeting all other requirements  Future Appointments  No visits were found meeting these conditions  Showing future appointments within next 150 days and meeting all other requirements       The patient was identified by name and date of birth  Aaron Nikita was informed that this is a telemedicine visit and that the visit is being conducted throughMicrosoft Teams and patient was informed that this is a secure, HIPAA-compliant platform  She agrees to proceed     My office door was closed  No one else was in the room  She acknowledged consent and understanding of privacy and security of the video platform   The patient has agreed to participate and understands they can discontinue the visit at any time  Patient is aware this is a billable service  Subjective  Jackelyn Garrison is a 43 y o  female  HPI     Past Medical History:   Diagnosis Date    Anxiety     Borderline personality disorder ([facility] )     Depression     Psychiatric illness     Self-injurious behavior     [facility]        No past surgical history on file  Current Outpatient Medications   Medication Sig Dispense Refill    albuterol (PROVENTIL HFA,VENTOLIN HFA) 90 mcg/act inhaler Inhale 2 puffs every 4 (four) hours as needed for wheezing 8 g 0    busPIRone (BUSPAR) 30 MG tablet Take 30 mg by mouth 2 (two) times a day      cyanocobalamin (VITAMIN B-12) 100 mcg tablet Take 1 tablet by mouth every 24 hours      escitalopram (LEXAPRO) 20 mg tablet Take 1 tablet (20 mg total) by mouth daily With 10 mg 30 tablet 0    eszopiclone (LUNESTA) 2 mg tablet Take 1 tablet (2 mg total) by mouth daily at bedtime as needed for sleep Take immediately before bedtime 30 tablet 1    QUEtiapine (SEROquel) 100 mg tablet Take 100 mg by mouth daily at bedtime       No current facility-administered medications for this visit  Allergies   Allergen Reactions    Ceclor [Cefaclor] Hives    Nuts - Food Allergy GI Intolerance    Latex Rash       Review of Systems    Video Exam    There were no vitals filed for this visit  Physical Exam     I spent four group hours + case management minutes with patient today in which greater than 50% of the time was spent in counseling/coordination of care regarding see notes    [address] verbally agrees to participate in [employer]  Pt is aware that [employer] could be limited without vital signs or the ability to perform a full hands-on physical exam  Jackelyn Garrison understands she or the provider may request at any time to terminate the video visit and request the patient to seek care or treatment in person

## 2022-01-26 NOTE — PSYCH
Virtual Regular Visit    Verification of patient location:    Patient is located in the following state in which I hold an active license [place]               Reason for visit is   Chief Complaint   Patient presents with    Virtual Regular Visit        [facility]    Provider located at [address] [address] [phone]      Recent Visits  Date Type Provider Dept   01/21/22 Evgeny Aldridge  [facility] recent visits within past 7 days and meeting all other requirements  Future Appointments  No visits were found meeting these conditions  Showing future appointments within next 150 days and meeting all other requirements       The patient was identified by name and date of birth  Aundrea Curtis was informed that this is a telemedicine visit and that the visit is being conducted throughMicrosoft Teams and patient was informed that this is a secure, HIPAA-compliant platform  She agrees to proceed     My office door was closed  No one else was in the room  She acknowledged consent and understanding of privacy and security of the video platform  The patient has agreed to participate and understands they can discontinue the visit at any time  Patient is aware this is a billable service  VIRTUAL VISIT DISCLAIMER    Aundrea Curtis verbally agrees to participate in [address]  Pt is aware that [address] could be limited without vital signs or the ability to perform a full hands-on physical exam  Jackelyn Boo understands she or the provider may request at any time to terminate the video visit and request the patient to seek care or treatment in person          Progress Note - Behavioral Health   [facility]  Jose JOHNSON 43 y o  female MRN: [id number]     Progress at partial program: some improvement  Drea Padilla seen by Dr Colmenares Done 1/21 at which time lunesta restarted  Drea Padilla reports improved sleep on lunesta 1 mg and seroquel 100 mg q bedtime  Says her mood is improving and she has decreased impulses to self-injure  Able to use coping skills  Able to ignore suicidal thoughts which occur when she is alone  Suicidal thoughts preceded by feelings of guilt/shame  Anxiety is manageable           ROS:   As above otherwise negative    Active Ambulatory Problems     Diagnosis Date Noted    Severe episode of recurrent major depressive disorder, without psychotic features ([facility] ) 12/07/2016    Generalized anxiety disorder 12/07/2016    Candida rash of groin 11/04/2021    Borderline personality disorder ([facility] )     PMDD (premenstrual dysphoric disorder) 11/18/2021    Insomnia 12/10/2021     Resolved Ambulatory Problems     Diagnosis Date Noted    Medical clearance for psychiatric admission 11/04/2021    Bronchitis 11/04/2021     Past Medical History:   Diagnosis Date    Anxiety     Depression     Psychiatric illness     Self-injurious behavior     Suicide attempt ([place] [address] )      Allergies   Allergen Reactions    Ceclor [Cefaclor] Hives    Nuts - Food Allergy GI Intolerance    Latex Rash          Mental Status Evaluation:    Appearance:  age appropriate   Behavior:  cooperative   Speech:  normal rate and volume   Mood:  improved   Affect:  brighter   Thought Process:  goal directed   Associations: intact associations   Thought Content:  no overt delusions   Perceptual Disturbances: none   Risk Potential: Suicidal ideation - Yes, fleeting suicidal thoughts  Homicidal ideation - None   Sensorium:  oriented to person, place and time/date   Memory:  recent and remote memory grossly intact   Consciousness:  alert and awake   Attention: attention span and concentration are age appropriate   Insight:  intact   Judgment: intact   Gait/Station: unable to assess   Motor Activity: unable to assess today due to virtual visit       Laboratory results: I have personally reviewed all pertinent laboratory/tests results  Assessment   Diagnoses and all orders for this visit:    Severe episode of recurrent major depressive disorder, without psychotic features (HCC)    Generalized anxiety disorder       Current Outpatient Medications   Medication Sig Dispense Refill    albuterol (PROVENTIL HFA,VENTOLIN HFA) 90 mcg/act inhaler Inhale 2 puffs every 4 (four) hours as needed for wheezing 8 g 0    busPIRone (BUSPAR) 30 MG tablet Take 30 mg by mouth 2 (two) times a day      cyanocobalamin (VITAMIN B-12) 100 mcg tablet Take 1 tablet by mouth every 24 hours      escitalopram (LEXAPRO) 20 mg tablet Take 1 tablet (20 mg total) by mouth daily With 10 mg 30 tablet 0    eszopiclone (LUNESTA) 2 mg tablet Take 1 tablet (2 mg total) by mouth daily at bedtime as needed for sleep Take immediately before bedtime 30 tablet 1    QUEtiapine (SEROquel) 100 mg tablet Take 100 mg by mouth daily at bedtime       No current facility-administered medications for this visit  Plan:  Cont present meds: lunesta 1 mg q bedtime, seroquel 100 mg q bedtime, lexapro 20 mg/d, buspar 30 mg bid  Risks/benefits discussed  It is medically necessary for pt to continue PHP

## 2022-01-26 NOTE — PSYCH
Subjective:     Patient ID: Kevin Link is a 43 y o  female  [facility] Clinical Progress Notes      Specialized Services Documentation  Therapist must complete separate progress note for each specific clinical activity in which the individual participated during the day  Group Psychotherapy   This group was facilitated virtually in a private office using HIPAA Compliant and Approved [employer] Teams  Kevin Link consented to the use of tele-video modality of treatment  [address]  actively shared in psychotherapy group focused on DBT mindfulness strategies what skills  Group explored practice of what skills through observing, describing and participating in a given task  PAUL was able to voice a way to practice mindfulness tonight through being attentive to paperwork she needs to complete  Group discussed ways to apply to slowing down to make more effective choices  Some beginning  effort noted toward treatment goal   Continue psychotherapy to encourage the development and practice of mindfulness strategies to alleviate symptoms and support wellness        Tx Plan Objective: 1 1, Therapist:  Debora BERGMAN

## 2022-01-26 NOTE — PSYCH
Subjective:     Patient ID: Jose Freeman is a 43 y o  female  [facility] Clinical Progress Notes      Specialized Services Documentation  Therapist must complete separate progress note for each specific clinical activity in which the individual participated during the day  Group Psychotherapy   This group was facilitated virtually in a private office using HIPAA Compliant and Approved [employer] Teams  Jose Freeman consented to the use of tele-video modality of treatment  ([id number]) Jose Freeman was present in psychoeducational group about the Pandemic- COVID-19 and self-care  The group followed a slide show on Part 1 of the Pandemic which included self-care topics:   What we know to do   General practices for mental health well-being   Concerns during the pandemic   Protect yourself from  Anxiety Contagion during the pandemic   Staying connected to others during the pandemic   Staying motivated during the pandemic   Mindful masking practice   Washing hands- Soap and hand     The group was assigned a task to write down 5 ways to stay motivated during the pandemic  Continue psychotherapy to encourage self-care during the pandemic  Treatment Plan Objective: 1 1, 1 2, 1 4   Therapist: Manisha WHEELERN RN     Other [address] [facility] voicemail authorization for 22 treatment days approved from 01/24/2022 through 02/22/2022  Authorization number [id number]  Case Management Note    Manisha Nance RN, BSN    Current suicide risk : Moderate    [id number] Met with Jose Freeman  Reported she had a "bad day" after group with SI but states her  was with her and was able to manage thoughts  Rock Ha reports on Tuesday she had a better day and was able to manage SI thoughts with distraction on her own   Rock Ha is active with writing her WRAP with input from spouse and plans on using it as a "manual to Rock [place]", and reviewing it daily even once complete  Progress noted in that Bo Gutierrez recognizes she is able to manage her thoughts by distraction and living her WRAP  Barriers continue to be feeling shameful  Reviewed her safety plan  Denies thoughts of SI today  Medications changes/added/denied? No    Treatment session number: 3    Individual Case Management Visit provided today?  Yes     [facility] follow up physician's orders: No new orders

## 2022-01-27 ENCOUNTER — APPOINTMENT (OUTPATIENT)
Dept: PSYCHOLOGY | Facility: CLINIC | Age: 37
End: 2022-01-27
Payer: COMMERCIAL

## 2022-01-27 ENCOUNTER — OFFICE VISIT (OUTPATIENT)
Dept: PSYCHIATRY | Facility: CLINIC | Age: 37
End: 2022-01-27
Payer: COMMERCIAL

## 2022-01-27 ENCOUNTER — DOCUMENTATION (OUTPATIENT)
Dept: PSYCHOLOGY | Facility: CLINIC | Age: 37
End: 2022-01-27

## 2022-01-27 DIAGNOSIS — F41.1 GENERALIZED ANXIETY DISORDER: ICD-10-CM

## 2022-01-27 DIAGNOSIS — F33.2 SEVERE EPISODE OF RECURRENT MAJOR DEPRESSIVE DISORDER, WITHOUT PSYCHOTIC FEATURES (HCC): Primary | ICD-10-CM

## 2022-01-27 DIAGNOSIS — F32.81 PMDD (PREMENSTRUAL DYSPHORIC DISORDER): ICD-10-CM

## 2022-01-27 DIAGNOSIS — F60.3 BORDERLINE PERSONALITY DISORDER (HCC): ICD-10-CM

## 2022-01-27 PROCEDURE — 90792 PSYCH DIAG EVAL W/MED SRVCS: CPT | Performed by: PSYCHIATRY & NEUROLOGY

## 2022-01-27 NOTE — PROGRESS NOTES
Subjective:     Patient ID: Symone Hamlin is a 43 y o  female  [facility] Clinical Progress Notes      Specialized Services Documentation  Therapist must complete separate progress note for each specific clinical activity in which the individual participated during the day  Symone Hamlin is off from [facility] today due to psychiatry appointment in order to establish outpatient care      Kody SHORE RN
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
## 2022-01-28 ENCOUNTER — OFFICE VISIT (OUTPATIENT)
Dept: PSYCHOLOGY | Facility: CLINIC | Age: 37
End: 2022-01-28
Payer: COMMERCIAL

## 2022-01-28 DIAGNOSIS — F41.1 GENERALIZED ANXIETY DISORDER: ICD-10-CM

## 2022-01-28 DIAGNOSIS — F33.2 SEVERE EPISODE OF RECURRENT MAJOR DEPRESSIVE DISORDER, WITHOUT PSYCHOTIC FEATURES (HCC): Primary | ICD-10-CM

## 2022-01-28 PROCEDURE — G0176 OPPS/PHP;ACTIVITY THERAPY: HCPCS

## 2022-01-28 PROCEDURE — G0177 OPPS/PHP; TRAIN & EDUC SERV: HCPCS

## 2022-01-28 PROCEDURE — G0410 GRP PSYCH PARTIAL HOSP 45-50: HCPCS

## 2022-01-28 NOTE — PSYCH
Subjective:     Patient ID: Anna Bee is a 43 y o  female  [facility] Clinical Progress Notes      Specialized Services Documentation  Therapist must complete separate progress note for each specific clinical activity in which the individual participated during the day  GROUP PSYCHOTHERAPY ([id number]) Group was facilitated virtually in a private office using HIPAA Compliant and Approved [employer] Teams  Oren Turner consented to the use of tele-video modality of treatment and was virtually present for group psychotherapy today  The group engaged in education about the benefits of Gratitude Journaling pertaining to the self  Members were prompted to reflect on their negative self-talk and ways they can improve this by engaging in journaling about things they are grateful for with regards to themselves  The group practiced journaling by responding to at least one of the following prompts:  1  Tell us about a time you were proud of yourself  2  What is one of your biggest strengths? 3  What makes you feel confident? Oren Turner seemed very engaged throughout the group session  Jackelyn used the first prompt and stated, "There was a work project that went on in my group and I was the only one who understood how to work with it  Karson Wilcox is encouraged to make progress towards goals and objectives through group participation and will continue to attend psychotherapy group      Tx plan objective: 1 1, 1 2   Therapist: Tyler Arcos MA

## 2022-01-28 NOTE — PSYCH
Subjective:     Patient ID: Jose Freeman is a 43 y o  female  [facility] Clinical Progress Notes      Specialized Services Documentation  Therapist must complete separate progress note for each specific clinical activity in which the individual participated during the day  Group Psychotherapy   This group was facilitated virtually in a private office using HIPAA Compliant and Approved [employer] Teams  Jose Freeman consented to the use of tele-video modality of treatment  [id number]  actively shared in psychotherapy group exploring DBT distress tolerance crisis survival strategies  Group explored crisis survival strategies ACCEPTS, SELF-SOOTHING, TIP, STOP, IMPROVE and PROS & CONS) reinforcing actions one could take to learn to tolerate stressful experiences, thoughts and urges  Rock Ha felt she could put effort into practicing "a brief vacation"  Some progress toward goal noted  Continue psychotherapy to encourage learning, practice and home practice of skills to manage distress        Tx Plan Objective: 1 1, Therapist:  Neil BERGMAN

## 2022-01-28 NOTE — PSYCH
Subjective:     Patient ID: Travis Lilly is a 43 y o  female  [facility] Clinical Progress Notes      Specialized Services Documentation  Therapist must complete separate progress note for each specific clinical activity in which the individual participated during the day  Case Management Note  This case management session was facilitated virtually in a private office using HIPAA Compliant and Approved [employer] Teams  Travis Lilly consented to the use of tele-video modality of treatment  Tyler Russell RN, BSN    Current suicide risk : Moderate    [id number]  Met with Travis Lilly  Reported she met with Dr Kandice Rodriguez yesterday and appointment went well as she had her  there for support  Progress noted in that Markus Jovon reports she is working on her WRAP, is advocating for herself, and recognizing she needs to do self care at various times throughout the day  Denies SI today  Markus Lynnconnor identified her  and best friend Isabelle Hartley will be her supports this weekend  Medications changes/added/denied? No    Treatment session number: 4    Individual Case Management Visit provided today?  Yes     [facility] follow up physician's orders: No new orders

## 2022-01-28 NOTE — PSYCH
Subjective:     Patient ID: Travis Lilly is a 43 y o  female  [facility] Clinical Progress Notes      Specialized Services Documentation  Therapist must complete separate progress note for each specific clinical activity in which the individual participated during the day  Group Psychotherapy Life Skills  ([id number]) Travis Lilly actively engaged in group focused on fun movement activities which was facilitated virtually in a private office using HIPAA Compliant and Approved Microsoft Teams  Markus Sigala consented to the use of tele-video modality of treatment and was virtually present for group psychotherapy today  The group shared fun memories from the past that involved movement activities that they have done and discussed the feelings associated with it  The group created a list of movement activities that they would like to try or do more of in order to relieve stress  Markus Sigala stated that they would like to stretch  The group discussed the benefits that movement has on mental health  Group members listened to a short clip, Movement and Mental Health, which discussed habit stacking and the benefits of movement  Markus Sigala continues to make progress towards goals through verbal participation in group; to accomplish long term goals continue to utilize skills learned in programming  Continue with psychotherapy to educate and encourage use of wellness tools  Tx Plan Objective: 1 1,1 2 Therapist: Sandy Best      Education Therapy   [id number] Travis Lilly actively shared in morning assessment and goal review  Presented as Receptive related to readiness to learn  Travis Lilly did complete goal from last treatment day identifying gaining hope,education,advocacy,responsibility and support   did not present with any barriers to learning  [address] engaged throughout the treatment day   Was engaged in learning related to Illness, Medication, Aftercare, and Wellness Tools  Staff utilized Verbal, Written, A/V, and Demonstration teaching methods  Adalberto An shared area of learning and set a goal for outside of program to practice affirmations and take a brief vacation         Tx Plan Objective: 1 1,1 2,1 4 Therapist:  ANVDEEP Choi

## 2022-01-28 NOTE — PSYCH
Virtual Regular Visit    Verification of patient location:    Patient is located in the following state in which I hold an active license [place]      Assessment/Plan:    Problem List Items Addressed This Visit        Other    Severe episode of recurrent major depressive disorder, without psychotic features ([facility] [address] ) - Primary    Generalized anxiety disorder          Goals addressed in session:           Reason for visit is PHP VIRTUAL GROUP DUE TO COVID-19      Encounter provider [address] PARTIAL [address]    Provider located at [address] [place] [id number]      Recent Visits  Date Type Provider Dept   01/26/22 Office Visit [address] PARTIAL PSYCH GROUP THERAPY [facility]   01/25/22 Office Visit [address] PARTIAL PSYCH GROUP THERAPY [facility]   01/24/22 Office Visit [address] PARTIAL PSYCH GROUP THERAPY [facility]   01/21/22 Office Visit [address] PARTIAL PSYCH GROUP THERAPY [facility]   Showing recent visits within past 7 days and meeting all other requirements  Today's Visits  Date Type Provider Dept   01/28/22 Office Visit [address] PARTIAL PSYCH GROUP THERAPY [facility]   Showing today's visits and meeting all other requirements  Future Appointments  No visits were found meeting these conditions  Showing future appointments within next 150 days and meeting all other requirements       The patient was identified by name and date of birth  Gigi Liu was informed that this is a telemedicine visit and that the visit is being conducted throughMicrosoft Teams and patient was informed that this is a secure, HIPAA-compliant platform  She agrees to proceed     My office door was closed  No one else was in the room  She acknowledged consent and understanding of privacy and security of the video platform   The patient has agreed to participate and understands they can discontinue the visit at any time     Patient is aware this is a billable service  Subjective  Jackelyn So is a 43 y o  female   HPI     Past Medical History:   Diagnosis Date    Anxiety     Borderline [facility] disorder ([facility] [address] )     Depression     Psychiatric illness     Self-injurious behavior     Suicide attempt ([facility] )        No past surgical history on file  Current Outpatient Medications   Medication Sig Dispense Refill    albuterol (PROVENTIL HFA,VENTOLIN HFA) 90 mcg/act inhaler Inhale 2 puffs every 4 (four) hours as needed for wheezing (Patient not taking: Reported on 1/27/2022 ) 8 g 0    busPIRone (BUSPAR) 30 MG tablet Take 30 mg by mouth 2 (two) times a day      cyanocobalamin (VITAMIN B-12) 100 mcg tablet Take 1 tablet by mouth every 24 hours      escitalopram (LEXAPRO) 20 mg tablet Take 1 tablet (20 mg total) by mouth daily With 10 mg 30 tablet 0    eszopiclone (LUNESTA) 2 mg tablet Take 1 tablet (2 mg total) by mouth daily at bedtime as needed for sleep Take immediately before bedtime (Patient taking differently: Take 1 mg by mouth daily at bedtime as needed for sleep Take immediately before bedtime ) 30 tablet 1    QUEtiapine (SEROquel) 100 mg tablet Take 100 mg by mouth daily at bedtime       No current facility-administered medications for this visit  Allergies   Allergen Reactions    Ceclor [Cefaclor] Hives    Nuts - Food Allergy GI Intolerance    Latex Rash       Review of Systems    Video Exam    There were no vitals filed for this visit  Physical Exam     I spent FOUR GROUP HOURS PLUS CASE MANAGEMENT minutes with patient today in which greater than 50% of the time was spent in counseling/coordination of care regarding PHP - SEE NOTES  VIRTUAL VISIT DISCLAIMER    Wilfredo Shea verbally agrees to participate in [employer]   Pt is aware that Virtual Care Services could be limited without vital signs or the ability to perform a full hands-on physical exam  Jackelyn Liss Falcon understands she or the provider may request at any time to terminate the video visit and request the patient to seek care or treatment in person

## 2022-01-31 ENCOUNTER — OFFICE VISIT (OUTPATIENT)
Dept: PSYCHOLOGY | Facility: CLINIC | Age: 37
End: 2022-01-31
Payer: COMMERCIAL

## 2022-01-31 DIAGNOSIS — F41.1 GENERALIZED ANXIETY DISORDER: ICD-10-CM

## 2022-01-31 DIAGNOSIS — F32.81 PMDD (PREMENSTRUAL DYSPHORIC DISORDER): ICD-10-CM

## 2022-01-31 DIAGNOSIS — F33.2 SEVERE EPISODE OF RECURRENT MAJOR DEPRESSIVE DISORDER, WITHOUT PSYCHOTIC FEATURES (HCC): Primary | ICD-10-CM

## 2022-01-31 PROCEDURE — G0410 GRP PSYCH PARTIAL HOSP 45-50: HCPCS

## 2022-01-31 PROCEDURE — G0177 OPPS/PHP; TRAIN & EDUC SERV: HCPCS

## 2022-01-31 PROCEDURE — G0176 OPPS/PHP;ACTIVITY THERAPY: HCPCS

## 2022-01-31 NOTE — PSYCH
Virtual Regular Visit    Verification of patient location:    Patient is located in the following state in which I hold an active license [place]      Assessment/Plan:    Problem List Items Addressed This Visit        Other    Severe episode of recurrent major depressive disorder, without psychotic features ([facility] [address] ) - Primary    Generalized anxiety disorder    PMDD (premenstrual dysphoric disorder)          Goals addressed in session:           Reason for visit is PHP VIRTUAL GROUP DUE TO COVID-19      Encounter provider [facility] PARTIAL [address]    Provider located at [address] [place] [address]  [address] [place] [phone]      Recent Visits  Date Type Provider Dept   01/28/22 Office Visit [facility] PARTIAL PSYCH GROUP THERAPY [facility]   01/26/22 Office Visit [facility] PARTIAL PSYCH GROUP THERAPY [facility]   01/25/22 Office Visit [facility] PARTIAL PSYCH GROUP THERAPY [facility]   01/24/22 Office Visit [facility] PARTIAL PSYCH GROUP THERAPY [facility]   Showing recent visits within past 7 days and meeting all other requirements  Today's Visits  Date Type Provider Dept   01/31/22 Office Visit [facility] PARTIAL PSYCH GROUP THERAPY [facility]   Showing today's visits and meeting all other requirements  Future Appointments  No visits were found meeting these conditions  Showing future appointments within next 150 days and meeting all other requirements       The patient was identified by name and date of birth  Linn Frazier was informed that this is a telemedicine visit and that the visit is being conducted throughMicrosoft Teams and patient was informed that this is a secure, HIPAA-compliant platform  She agrees to proceed     My office door was closed  No one else was in the room  She acknowledged consent and understanding of privacy and security of the video platform   The patient has agreed to participate and understands they can discontinue the visit at any time  Patient is aware this is a billable service  Subjective  Jackelyn Walker is a 43 y o  female   HPI     Past Medical History:   Diagnosis Date    Anxiety     Borderline [facility] disorder ([facility] [address] )     Depression     Psychiatric illness     Self-injurious behavior     Suicide attempt ([facility] )        No past surgical history on file  Current Outpatient Medications   Medication Sig Dispense Refill    albuterol (PROVENTIL HFA,VENTOLIN HFA) 90 mcg/act inhaler Inhale 2 puffs every 4 (four) hours as needed for wheezing (Patient not taking: Reported on 1/27/2022 ) 8 g 0    busPIRone (BUSPAR) 30 MG tablet Take 30 mg by mouth 2 (two) times a day      cyanocobalamin (VITAMIN B-12) 100 mcg tablet Take 1 tablet by mouth every 24 hours      escitalopram (LEXAPRO) 20 mg tablet Take 1 tablet (20 mg total) by mouth daily With 10 mg 30 tablet 0    eszopiclone (LUNESTA) 2 mg tablet Take 1 tablet (2 mg total) by mouth daily at bedtime as needed for sleep Take immediately before bedtime (Patient taking differently: Take 1 mg by mouth daily at bedtime as needed for sleep Take immediately before bedtime ) 30 tablet 1    QUEtiapine (SEROquel) 100 mg tablet Take 100 mg by mouth daily at bedtime       No current facility-administered medications for this visit  Allergies   Allergen Reactions    Ceclor [Cefaclor] Hives    Nuts - Food Allergy GI Intolerance    Latex Rash       Review of Systems    Video Exam    There were no vitals filed for this visit  Physical Exam     I spent FOUR GROUP HOURS PLUS CASE MANAGEMENT minutes with patient today in which greater than 50% of the time was spent in counseling/coordination of care regarding PHP - SEE NOTES  VIRTUAL VISIT DISCLAIMER    Symone Boubacar verbally agrees to participate in [employer]   Pt is aware that [employer] could be limited without vital signs or the ability to perform a full hands-on physical exam  Jaceklyn Goldman understands she or the provider may request at any time to terminate the video visit and request the patient to seek care or treatment in person

## 2022-01-31 NOTE — PSYCH
Subjective:     Patient ID: Jeremy Fabian is a 43 y o  female  [facility] Clinical Progress Notes      Specialized Services Documentation  Therapist must complete separate progress note for each specific clinical activity in which the individual participated during the day  Allied Therapy   This group was facilitated virtually in a private office using HIPAA Compliant and Approved [employer] Teams  Jeremy Fabian consented to the use of tele-video modality of treatment  [id number] Jeremy Fabian  actively shared in music therapy group focused on mindfulness strategies - DBT How skills  Group introduced to and practiced the [facility] skills non-judgmental, one-mindfully, and effectiveness through various tasks  Bo Gutierrez was engaged, asked appropriate questions, and shared meaningful feedback  Group discussed ways to apply to slowing down to make more effective choices  She identified she could practice mindfulness tonight through scrapbooking  Some positive effort noted toward treatment goal   Continue music therapy to encourage the development and practice of mindfulness strategies to alleviate symptoms and support wellness       Tx Plan Objective: 1 1, Therapist:  Ernesta Hamman MT-BC

## 2022-01-31 NOTE — PSYCH
Subjective:     Patient ID: Kevin Link is a 43 y o  female  [facility] Clinical Progress Notes      Specialized Services Documentation  Therapist must complete separate progress note for each specific clinical activity in which the individual participated during the day  GROUP PSYCHOTHERAPY ([id number]) Group was facilitated virtually in a private office using HIPAA Compliant and Approved Microsoft Teams  Martinez Vang consented to the use of tele-video modality of treatment and was virtually present for group psychotherapy today  The group shared challenges they faced with managing their anger  We then discussed different anger management skills that could be used and how each member might implement these skills into their routine  The group was then asked the following questions:  - How does your anger present itself? - What is one skill you might use to work on your response? - What may be a challenge for you? Martinez Vang seemed fairly engaged throughout the group session  Martinez Vang stated that one skill they may try to help manage their anger is taking timeouts throughout her day  Martinez Agrawalharry is encouraged to make progress towards goals and objectives through group participation and will continue to attend psychotherapy group      Tx plan objective: 1 1, 1 2   Therapist: Chelsea Rowley MA

## 2022-01-31 NOTE — PSYCH
Subjective:     Patient ID: Wilfredo Shea is a 43 y o  female  [facility] Clinical Progress Notes      Specialized Services Documentation  Therapist must complete separate progress note for each specific clinical activity in which the individual participated during the day  Group Psychotherapy  This group was facilitated virtually in a private office using HIPAA Compliant and Approved [employer] Teams  Wilfredo Shea consented to the use of tele-video modality of treatment  ([id number]) Wilfredo Shea actively engaged in psychoeducational group about medication management, types of antidepressants/side effects, and medication tips  Group came up with strategies that helped them remember to take their medications and developed ideas to make it easier in the future  The group talked about understanding the purpose, dose, type, timing and side effects of their medications  Teaching on emergency situations and who to go to for help was brought up as well  Group was encouraged to ask questions in an open forum at the end of group  Some progress displayed through engagement in topic  Treatment Plan Objective 1 1, 1 2, 1 3, 1 4 Therapist: Doris WHEELERN RN       Education Therapy   [id number] Wilfredo Shea actively shared in morning assessment and goal review  Presented as Receptive related to readiness to learn  Wilfredo Shea did complete goal from last treatment day identifying gaining hope, education, advocacy, responsibility and support  did not present with any barriers to learning  [address] engaged throughout the treatment day  Was engaged in learning related to Illness, Medication, Aftercare, and Wellness Tools  Staff utilized Verbal, Written, A/V, and Demonstration teaching methods  Wilfredo Shea shared area of learning and set a goal for outside of program to pay car payment        Tx Plan Objective: 1 1,1 2,1 4 Therapist:  Mohan Chong Jorge SHORE RN     Case Management Note    Joyce SHORE RN    Current suicide risk : Low     A case management session is not scheduled today with Venancio Adam ; additionally, they did not request a CM meeting  Next scheduled session is Tuesday, 02/01/2022 at 1145  Medications changes/added/denied? No    Treatment session number: 5    Individual Case Management Visit provided today?  No    [facility] follow up physician's orders: None at this time

## 2022-02-01 ENCOUNTER — OFFICE VISIT (OUTPATIENT)
Dept: PSYCHOLOGY | Facility: CLINIC | Age: 37
End: 2022-02-01
Payer: COMMERCIAL

## 2022-02-01 DIAGNOSIS — F41.1 GENERALIZED ANXIETY DISORDER: Primary | ICD-10-CM

## 2022-02-01 DIAGNOSIS — F33.2 SEVERE EPISODE OF RECURRENT MAJOR DEPRESSIVE DISORDER, WITHOUT PSYCHOTIC FEATURES (HCC): ICD-10-CM

## 2022-02-01 DIAGNOSIS — G47.00 INSOMNIA, UNSPECIFIED TYPE: ICD-10-CM

## 2022-02-01 PROCEDURE — G0410 GRP PSYCH PARTIAL HOSP 45-50: HCPCS

## 2022-02-01 PROCEDURE — G0176 OPPS/PHP;ACTIVITY THERAPY: HCPCS

## 2022-02-01 NOTE — PSYCH
Subjective:     Patient ID: Kevin Link is a 43 y o  female  [facility] Clinical Progress Notes      Specialized Services Documentation  Therapist must complete separate progress note for each specific clinical activity in which the individual participated during the day  Case Management Note    David Maldonado RN, BSN    Current suicide risk : Moderate    At 0957 received email sent by Kevin Link stating, "I just wanted you to know that I am not doing well today  i feel hopeless and really depressed today  I have been trying to put together a schedule to make sure that i am using coping skills and making sure that i am getting on the right track, but it is so overwhelming  i can't seem to wake up on time or take my meds on time  I don't know what has changed  I feel like I can't get through the day today  on a another note, Colleen Gregory and I are trying to get the paperwork for the Short Term Disability and need to get the paperwork back  can you look into this for us?"   At [phone] called Kevin Link for check in related to email  Martinez Vang indicated she does not have thoughts of SI but of self harm  Support provided and discussed coping skills used to manage thoughts  She will call her  to discuss as she feels safe to make phone call  Martinez Vang is aware the [employer] disability paperwork will be completed tomorrow during her med check with Broderick aYng PA-C  At 1040 this writer made phone call to Jose Galaviz  and emergency contact  Wilbert Bales reports talking with Martinez Vang and will continue with their plan for her to come to his place of employment  or to reach out to her neighbor if thoughts become worse  At 1045 received email from Martinez Vang stating, "I do not feel safe staying home alone so i am going to follow our safety plan of going to my 's work  this means i will not be in class   i don't know if this is going to be an issue, but safety is top fatuma right now "   [facility] and this  spoke via phone  Markus Sigala is going to leave to go to her husbands place of employment as in their safety plan  Markus Sigala is to call this writer to inform she made it safely to   [phone] Jackelyn phoned this writer and has [facility]  Markus Sigala stated, "I tried to put the schedule together last evening to include self care and coping skills, I became overwhelmed  I was thinking about needing to go back to work as I need to help pay the household bills  Today I was very anxious and had thoughts of self harm, those thoughts became stronger  I called my  at 1000 then tried to do breathing exercises, TIPPS, ice, standing outside in the cold, and meditation but my thoughts turned to planning SI  That's when I knew I needed to go to my husbands work place as that's the safety plan " Deep breathing and thought stopping attempted at this time with Markus Sigala and she stated she felt better being with her  and doing the exercises  Markus Lynnconnor was encouraged to go to call crisis or go ED if thoughts persist or become worse  Markus Sigala stated her understanding  1119 this writer called Clint Stanley to assure Markus Sigala was with her  He is aware if Brett thoughts of SI/self harm persist or worsen to call crisis or go to ED  Medications changes/added/denied? No    Treatment session number: 6    Individual Case Management Visit provided today?  Yes     [facility] follow up physician's orders: No orders at this time

## 2022-02-01 NOTE — PSYCH
Virtual Regular Visit    Verification of patient location:    Patient is located in the following state in which I hold an active license [place]      Assessment/Plan:    Problem List Items Addressed This Visit        Other    Severe episode of recurrent major depressive disorder, without psychotic features ([facility] [address] )    Generalized anxiety disorder - Primary    Insomnia          Goals addressed in session:           Reason for visit is PHP VIRTUAL GROUP DUE TO COVID-19      Encounter provider [address]    Provider located at [address] [place] [phone]      Recent Visits  Date Type Provider Dept   01/31/22 Office Visit [address] PARTIAL PSYCH GROUP THERAPY [facility]   01/28/22 Office Visit [address] PARTIAL PSYCH GROUP THERAPY [facility]   01/26/22 Office Visit [address] PARTIAL PSYCH GROUP THERAPY [facility]   01/25/22 Office Visit [address] PARTIAL PSYCH GROUP THERAPY [facility]   Showing recent visits within past 7 days and meeting all other requirements  Today's Visits  Date Type Provider Dept   02/01/22 Office Visit [address] PARTIAL PSYCH GROUP THERAPY [facility]   Showing today's visits and meeting all other requirements  Future Appointments  No visits were found meeting these conditions  Showing future appointments within next 150 days and meeting all other requirements       The patient was identified by name and date of birth  Lia Quinten was informed that this is a telemedicine visit and that the visit is being conducted throughMicrosoft Teams and patient was informed that this is a secure, HIPAA-compliant platform  She agrees to proceed     My office door was closed  No one else was in the room  She acknowledged consent and understanding of privacy and security of the video platform   The patient has agreed to participate and understands they can discontinue the visit at any time  Patient is aware this is a billable service  Subjective  Jackelyn Hay is a 43 y o  female   HPI     Past Medical History:   Diagnosis Date    Anxiety     Borderline personality disorder ([facility] [address] )     Depression     Psychiatric illness     Self-injurious behavior     Suicide attempt ([facility] )        No past surgical history on file  Current Outpatient Medications   Medication Sig Dispense Refill    albuterol (PROVENTIL HFA,VENTOLIN HFA) 90 mcg/act inhaler Inhale 2 puffs every 4 (four) hours as needed for wheezing (Patient not taking: Reported on 1/27/2022 ) 8 g 0    busPIRone (BUSPAR) 30 MG tablet Take 30 mg by mouth 2 (two) times a day      cyanocobalamin (VITAMIN B-12) 100 mcg tablet Take 1 tablet by mouth every 24 hours      escitalopram (LEXAPRO) 20 mg tablet Take 1 tablet (20 mg total) by mouth daily With 10 mg 30 tablet 0    eszopiclone (LUNESTA) 2 mg tablet Take 1 tablet (2 mg total) by mouth daily at bedtime as needed for sleep Take immediately before bedtime (Patient taking differently: Take 1 mg by mouth daily at bedtime as needed for sleep Take immediately before bedtime ) 30 tablet 1    QUEtiapine (SEROquel) 100 mg tablet Take 100 mg by mouth daily at bedtime       No current facility-administered medications for this visit  Allergies   Allergen Reactions    Ceclor [Cefaclor] Hives    Nuts - Food Allergy GI Intolerance    Latex Rash       Review of Systems    Video Exam    There were no vitals filed for this visit  Physical Exam     I spent FOUR GROUP HOURS PLUS CASE MANAGEMENT minutes with patient today in which greater than 50% of the time was spent in counseling/coordination of care regarding PHP - SEE NOTES  VIRTUAL VISIT DISCLAIMER    Philomena Hill verbally agrees to participate in [employer]   Pt is aware that [employer] could be limited without vital signs or the ability to perform a full hands-on physical simone Coulter understands she or the provider may request at any time to terminate the video visit and request the patient to seek care or treatment in person

## 2022-02-01 NOTE — PSYCH
Subjective:     Patient ID: Yanely Romero is a 43 y o  female  [facility] Clinical Progress Notes      Specialized Services Documentation  Therapist must complete separate progress note for each specific clinical activity in which the individual participated during the day  Group Psychotherapy Life Skills ([id number]) Yanely Romero actively engaged in group focused on the drama triangle which was facilitated virtually in a private office using HIPAA Compliant and Approved [employer] Teams  Erica Yan consented to the use of tele-video modality of treatment and was virtually present for group psychotherapy today  The group learned about the drama triangle and the roles of the victim, rescuer, and persecutor  Erica Yan identified having been in the role of a victim and rescuer   During the activity clients learned how to stop the drama triangle from continuing  Clients identified ways that they would stop the drama triangle from occurring  Roseannamonique Musemaci left group early  CM informed  Erica Yan made minimal progress towards goals through verbal participation in group; to accomplish long term goals continue to utilize skills learned in programming  Continue with psychotherapy to educate and encourage use of wellness tools  Tx Plan Objective: 1 1,1 2 Therapist: Kalli Lobato      Education Therapy   [id number] Yanely Romero actively shared in morning assessment and goal review  Presented as Receptive related to readiness to learn  Yanely Romero did complete goal from last treatment day identifying gaining responsibility and hope  did not present with any barriers to learning  [address] engaged throughout the treatment day but was nor present for wrap up     Tx Plan Objective: 1 1,1 2,1 4 Therapist:  NAVDEEP Hopkins

## 2022-02-01 NOTE — PSYCH
Subjective:     Patient ID: Kevin Link is a 43 y o  female  [facility] Clinical Progress Notes      Specialized Services Documentation  Therapist must complete separate progress note for each specific clinical activity in which the individual participated during the day  GROUP PSYCHOTHERAPY ([id number]) Group was facilitated virtually in a private office using HIPAA Compliant and Approved [employer] Teams  Martinez Vang consented to the use of tele-video modality of treatment and was virtually present for group psychotherapy today  The group engaged in education regarding self-talk and the different ways self-talk impacts mental health  Members were prompted to reflect on their negative self-talk and were then prompted to share different ways they can improve this  We discussed ways to utilize visualization and goal setting to help improve the way members feel about themselves  The group practiced positive self-talk by responding to at least one of the following prompts:  1  What is one negative statement you tend to engage in?  2  What is one way to change this to a positive statement? 3  Describe the person you wish to be  Martinezshagufta Agrawalw was not present during this session      Tx plan objective: n/a  Therapist: Chelsea Rowley MA

## 2022-02-01 NOTE — PSYCH
Subjective:     Patient ID: Kashmir Sharma is a 43 y o  female  [facility] Clinical Progress Notes      Specialized Services Documentation  Therapist must complete separate progress note for each specific clinical activity in which the individual participated during the day  Allied Therapy   [id number] Did not attend group  CM aware     Tx Plan Objective: na, Therapist:  Sheila BERGMAN

## 2022-02-02 ENCOUNTER — OFFICE VISIT (OUTPATIENT)
Dept: PSYCHOLOGY | Facility: CLINIC | Age: 37
End: 2022-02-02
Payer: COMMERCIAL

## 2022-02-02 ENCOUNTER — TELEMEDICINE (OUTPATIENT)
Dept: PSYCHIATRY | Facility: CLINIC | Age: 37
End: 2022-02-02
Payer: COMMERCIAL

## 2022-02-02 DIAGNOSIS — F41.1 GENERALIZED ANXIETY DISORDER: ICD-10-CM

## 2022-02-02 DIAGNOSIS — F33.2 SEVERE EPISODE OF RECURRENT MAJOR DEPRESSIVE DISORDER, WITHOUT PSYCHOTIC FEATURES (HCC): ICD-10-CM

## 2022-02-02 DIAGNOSIS — F32.81 PMDD (PREMENSTRUAL DYSPHORIC DISORDER): ICD-10-CM

## 2022-02-02 DIAGNOSIS — F60.3 BORDERLINE PERSONALITY DISORDER (HCC): ICD-10-CM

## 2022-02-02 DIAGNOSIS — F41.1 GENERALIZED ANXIETY DISORDER: Primary | ICD-10-CM

## 2022-02-02 DIAGNOSIS — F33.2 SEVERE EPISODE OF RECURRENT MAJOR DEPRESSIVE DISORDER, WITHOUT PSYCHOTIC FEATURES (HCC): Primary | ICD-10-CM

## 2022-02-02 DIAGNOSIS — G47.00 INSOMNIA, UNSPECIFIED TYPE: ICD-10-CM

## 2022-02-02 PROCEDURE — G0410 GRP PSYCH PARTIAL HOSP 45-50: HCPCS

## 2022-02-02 PROCEDURE — 99213 OFFICE O/P EST LOW 20 MIN: CPT | Performed by: PHYSICIAN ASSISTANT

## 2022-02-02 PROCEDURE — G0177 OPPS/PHP; TRAIN & EDUC SERV: HCPCS

## 2022-02-02 PROCEDURE — G0176 OPPS/PHP;ACTIVITY THERAPY: HCPCS

## 2022-02-02 NOTE — PSYCH
Subjective:     Patient ID: Marya Vanegas is a 43 y o  female  [facility] Clinical Progress Notes      Specialized Services Documentation  Therapist must complete separate progress note for each specific clinical activity in which the individual participated during the day  Group Psychotherapy Life Skills ([id number]) Marya Vanegas actively engaged in group focused on the empowerment triangle which was facilitated virtually in a private office using HIPAA Compliant and Approved [employer] Teams  Geovani Cooney consented to the use of tele-video modality of treatment and was virtually present for group psychotherapy today  The group learned about the empowerment triangle and the roles of the creator, , and challenger  The group examined how to shift their mindset from their role(s) in the drama triangle to their role(s) in the David's  Geovani Cooney discussed how they would shift their mindset from their role in the drama triangle to their role in the empowerment triangle by acknowledging your role  Clients shared what makes them feel empowered  Geovani Cooney continues to make progress towards goals through verbal participation in group; to accomplish long term goals continue to utilize skills learned in programming  Continue with psychotherapy to educate and encourage use of wellness tools   Tx Plan Objective: 1 1,1 2 Therapist: Sheldon Miles

## 2022-02-02 NOTE — PSYCH
Virtual Regular Visit    Verification of patient location:    Patient is located in the following state in which I hold an active license [place]      Assessment/Plan:    Problem List Items Addressed This Visit        Other    Severe episode of recurrent major depressive disorder, without psychotic features ([facility] [address] )    Generalized anxiety disorder - Primary    Borderline personality disorder ([facility] [address] )    PMDD (premenstrual dysphoric disorder)    Insomnia          Goals addressed in session:           Reason for visit is PHP VIRTUAL GROUP DUE TO COVID-19      Encounter provider [address] PARTIAL [address]    Provider located at [address] [place] [address]   [address] [facility] [id number]      Recent Visits  Date Type Provider Dept   02/01/22 Office Visit [address] PARTIAL PSYCH GROUP THERAPY [facility]   01/31/22 Office Visit [address] PARTIAL PSYCH GROUP THERAPY [facility]   01/28/22 Office Visit [address] PARTIAL PSYCH GROUP THERAPY [facility]   01/26/22 Office Visit [address] PARTIAL PSYCH GROUP THERAPY [facility]   Showing recent visits within past 7 days and meeting all other requirements  Today's Visits  Date Type Provider Dept   02/02/22 Office Visit [address] PARTIAL PSYCH GROUP THERAPY [facility]   Showing today's visits and meeting all other requirements  Future Appointments  No visits were found meeting these conditions  Showing future appointments within next 150 days and meeting all other requirements       The patient was identified by name and date of birth  Faviandella Liu was informed that this is a telemedicine visit and that the visit is being conducted throughMicrosoft Teams and patient was informed that this is a secure, HIPAA-compliant platform  She agrees to proceed     My office door was closed  No one else was in the room    She acknowledged consent and understanding of privacy and security of the video platform  The patient has agreed to participate and understands they can discontinue the visit at any time  Patient is aware this is a billable service  Subjective  Heather Garrett Kussmaul is a 43 y o  female   HPI     Past Medical History:   Diagnosis Date    Anxiety     Borderline personality disorder ([facility] [address] )     Depression     Psychiatric illness     Self-injurious behavior     Suicide attempt ([facility] )        No past surgical history on file  Current Outpatient Medications   Medication Sig Dispense Refill    albuterol (PROVENTIL HFA,VENTOLIN HFA) 90 mcg/act inhaler Inhale 2 puffs every 4 (four) hours as needed for wheezing (Patient not taking: Reported on 1/27/2022 ) 8 g 0    busPIRone (BUSPAR) 30 MG tablet Take 30 mg by mouth 2 (two) times a day      cyanocobalamin (VITAMIN B-12) 100 mcg tablet Take 1 tablet by mouth every 24 hours      escitalopram (LEXAPRO) 20 mg tablet Take 1 tablet (20 mg total) by mouth daily With 10 mg 30 tablet 0    eszopiclone (LUNESTA) 2 mg tablet Take 1 tablet (2 mg total) by mouth daily at bedtime as needed for sleep Take immediately before bedtime (Patient taking differently: Take 1 mg by mouth daily at bedtime as needed for sleep Take immediately before bedtime ) 30 tablet 1    QUEtiapine (SEROquel) 100 mg tablet Take 100 mg by mouth daily at bedtime       No current facility-administered medications for this visit  Allergies   Allergen Reactions    Ceclor [Cefaclor] Hives    Nuts - Food Allergy GI Intolerance    Latex Rash       Review of Systems    Video Exam    There were no vitals filed for this visit  Physical Exam     I spent FOUR GROUP HOURS PLUS CASE MANAGEMENT minutes with patient today in which greater than 50% of the time was spent in counseling/coordination of care regarding PHP - SEE NOTES  VIRTUAL VISIT DISCLAIMER    Vinigenevieve Nick verbally agrees to participate in [employer]   Pt is aware that [employer] could be limited without vital signs or the ability to perform a full hands-on physical exam  Jackelyn Coulter understands she or the provider may request at any time to terminate the video visit and request the patient to seek care or treatment in person

## 2022-02-02 NOTE — PSYCH
Subjective:     Patient ID: Aundrea Curtis is a 43 y o  female  [facility] Clinical Progress Notes      Specialized Services Documentation  Therapist must complete separate progress note for each specific clinical activity in which the individual participated during the day  Group Psychotherapy  This group was facilitated virtually in a private office using HIPAA Compliant and Approved [employer] Teams  Aundrea Curtis consented to the use of tele-video modality of treatment  ([id number]) Aundrea Curtis attended group on the Wellness Recovery Action Plan  Group members were educated on the background of the WRAP  Members were informed WRAP was emailed to them this AM   Writer explained the benefit of utilizing the WRAP prior to members initiating it  Members then focused on the WRAP: the wellness toolbox, daily plan, identification of triggers and stressors  Next the group began developing action plans to respond to stressors and crisis situations  Group members shared pieces of information from these sections and identified their importance  Writer encouraged the members of group to continue utilizing the packet to develop plans inside and outside of program  Good progress displayed through participation and engagement in topic  Treatment Plan Objectives: 1 1, 1 2  Therapist: Noe WHEELERN RN       Education Therapy   [id number] Aundrea Curtis actively shared in morning assessment and goal review  Presented as Receptive related to readiness to learn  Aundrea Curtis did complete goal from last treatment day identifying gaining hope and responsibility  did not present with any barriers to learning  [address] engaged throughout the treatment day  Was engaged in learning related to Illness, Medication, Aftercare, and Wellness Tools  Staff utilized Verbal, Written, A/V, and Demonstration teaching methods    Aundrea Curtis shared area of learning and set a goal for outside of program to wash laundry  Tx Plan Objective: 1 1,1 2,1 4 Therapist:  Doris SHORE RN       Case Management Note  This case management session was facilitated virtually in a private office using HIPAA Compliant and Approved [employer] Teams  Wilfredo Shea consented to the use of tele-video modality of treatment  Doris Milligan RN, BSN    Current suicide risk : Moderate    [id number] Met with Wilfredo Shea  Reported her mood was improved today over yesterday but does feel hopeless  Denied any thoughts of self harm, SI or HI  [facility] reported she is premenstrual and thought her mood change yesterday was a result  [facility] reported feeling tired today as she did not sleep well  Reported she did not take the Lunesta last night to it making her groggy in the morning  Medications changes/added/denied? No    Treatment session number: 6    Individual Case Management Visit provided today?  Yes     [facility] follow up physician's orders: No new orders

## 2022-02-02 NOTE — PSYCH
Subjective:     Patient ID: Wilfredo Shea is a 43 y o  female  [facility] Clinical Progress Notes      Specialized Services Documentation  Therapist must complete separate progress note for each specific clinical activity in which the individual participated during the day  Allied Therapy   This group was facilitated virtually in a private office using HIPAA Compliant and Approved [employer] Teams  Wilfredo Shea consented to the use of tele-video modality of treatment  [address]  actively shared in [facility] group focused on emotional myths and expression  Group explored value of emotion and challenges one has expressing them  Specific ways to ask for support reviewed  [employer] was able to share and was attentive throughout session  Some progress toward goal noted  Continue MTH group to encourage learning, practice and home practice of skills to manage/express emotions        Tx Plan Objective: 1 2,1 4, Therapist:  Talib Guajardo MT-BC

## 2022-02-02 NOTE — PSYCH
Virtual Regular Visit    Verification of patient location:    Patient is located in the following state in which I hold an active license [place]                 Reason for visit is   Chief Complaint   Patient presents with    Virtual Regular Visit        Encounter provider Vi Townsend PA-C    Provider located at [address] [address] [phone]      Recent Visits  Date Type Provider Dept   01/26/22 Telemedicine Vi Townsend PA-C [facility] recent visits within past 7 days and meeting all other requirements  Future Appointments  No visits were found meeting these conditions  Showing future appointments within next 150 days and meeting all other requirements       The patient was identified by name and date of birth  Jeremy Rui was informed that this is a telemedicine visit and that the visit is being conducted throughMicrosoft Teams and patient was informed that this is a secure, HIPAA-compliant platform  She agrees to proceed     My office door was closed  No one else was in the room  She acknowledged consent and understanding of privacy and security of the video platform  The patient has agreed to participate and understands they can discontinue the visit at any time  Patient is aware this is a billable service  VIRTUAL VISIT DISCLAIMER    Jeremyle Fabian verbally agrees to participate in [employer]  Pt is aware that [employer] could be limited without vital signs or the ability to perform a full hands-on physical exam  Jackelyn Harding understands she or the provider may request at any time to terminate the video visit and request the patient to seek care or treatment in person          Progress Note - Behavioral Health   [facility]  Yvonne Lundborg WRIGHT 43 y o  female MRN: [id number]     Progress at partial program: unchanged  Nico Holloway seen by RUBY 1/26 at which time meds unchanged  She was seen by new OP provider Dr Gerda Shook on 1/27 at which itme meds unchanged  Nico Holloway states she is having symptoms of PMDD- hopelessness and amotivation  Had SI yesterday and went to her 's work place as per her safety plan  Denies SI or urges to self-injure today  Denies any problems or concerns with meds             ROS:   As above otherwise negative    Active Ambulatory Problems     Diagnosis Date Noted    Severe episode of recurrent major depressive disorder, without psychotic features ([facility] ) 12/07/2016    Generalized anxiety disorder 12/07/2016    Candida rash of groin 11/04/2021    Borderline personality disorder ([facility] )     PMDD (premenstrual dysphoric disorder) 11/18/2021    Insomnia 12/10/2021     Resolved Ambulatory Problems     Diagnosis Date Noted    Medical clearance for psychiatric admission 11/04/2021    Bronchitis 11/04/2021     Past Medical History:   Diagnosis Date    Anxiety     Depression     Psychiatric illness     Self-injurious behavior     Suicide attempt ([place] [address] )      Allergies   Allergen Reactions    Ceclor [Cefaclor] Hives    Nuts - Food Allergy GI Intolerance    Latex Rash          Mental Status Evaluation:    Appearance:  casually dressed, adequate grooming   Behavior:  cooperative   Speech:  normal rate and volume   Mood:  depressed   Affect:  constricted   Thought Process:  goal directed   Associations: intact associations   Thought Content:  no overt delusions   Perceptual Disturbances: none   Risk Potential: Suicidal ideation - None at present  Homicidal ideation - None   Sensorium:  oriented to person, place and time/date   Memory:  recent and remote memory grossly intact   Consciousness:  alert and awake   Attention: attention span and concentration are age appropriate   Insight:  intact   Judgment: intact   Gait/Station: unable to assess   Motor Activity: unable to assess today due to virtual visit       Laboratory results: I have personally reviewed all pertinent laboratory/tests results  Assessment   Diagnoses and all orders for this visit:    Severe episode of recurrent major depressive disorder, without psychotic features (HCC)    PMDD (premenstrual dysphoric disorder)    Generalized anxiety disorder       Current Outpatient Medications   Medication Sig Dispense Refill    albuterol (PROVENTIL HFA,VENTOLIN HFA) 90 mcg/act inhaler Inhale 2 puffs every 4 (four) hours as needed for wheezing (Patient not taking: Reported on 1/27/2022 ) 8 g 0    busPIRone (BUSPAR) 30 MG tablet Take 30 mg by mouth 2 (two) times a day      cyanocobalamin (VITAMIN B-12) 100 mcg tablet Take 1 tablet by mouth every 24 hours      escitalopram (LEXAPRO) 20 mg tablet Take 1 tablet (20 mg total) by mouth daily With 10 mg 30 tablet 0    eszopiclone (LUNESTA) 2 mg tablet Take 1 tablet (2 mg total) by mouth daily at bedtime as needed for sleep Take immediately before bedtime (Patient taking differently: Take 1 mg by mouth daily at bedtime as needed for sleep Take immediately before bedtime ) 30 tablet 1    QUEtiapine (SEROquel) 100 mg tablet Take 100 mg by mouth daily at bedtime       No current facility-administered medications for this visit  Plan    Planned medication and treatment changes:  Encouraged self-care and mindfulness in use of coping skills during pre-menses  Encouraged cont f/u per safety plan  Recommend also discussing with gyne  No med change: lexapro 20 mg/d, buspar 30 mg bid, seroquel 100 mg q bedtime, lunesta1- 2 mg q bedtime prn  FMLA and STD papers completed (1/6/22-3/1/22)    All current active medications have been reviewed  Continue treatment with group therapy and partial program      Risks / Benefits of Treatment:    Risks, benefits, and possible side effects of medications explained to patient and patient verbalizes understanding and agrees to medications  Counseling / Coordination of Care:    Patient's progress discussed with staff in treatment team meeting  Medications, treatment progress and treatment plan reviewed with patient      Aryan Tristan PA-C 02/02/22

## 2022-02-02 NOTE — PSYCH
Virtual Regular Visit    Verification of patient location:    Patient is located in the following state in which I hold an active license [place]      Assessment/Plan:    Problem List Items Addressed This Visit        Other    Severe episode of recurrent major depressive disorder, without psychotic features ([facility] [address] )    Generalized anxiety disorder - Primary    Borderline personality disorder ([facility] [address] )    PMDD (premenstrual dysphoric disorder)    Insomnia          Goals addressed in session:          Reason for visit is   Chief Complaint   Patient presents with    Virtual Regular Visit        Encounter provider Mona Cornell    Provider located at [address] [place] [address]   [address] [place] [phone]      Recent Visits  Date Type Provider Dept   02/01/22 Office Visit [facility] PARTIAL PSYCH GROUP THERAPY [facility]   01/31/22 Office Visit [facility] PARTIAL PSYCH GROUP THERAPY [facility]   01/28/22 Office Visit [facility] PARTIAL PSYCH GROUP THERAPY [facility]   01/26/22 Office Visit [facility] PARTIAL PSYCH GROUP THERAPY [facility]   Showing recent visits within past 7 days and meeting all other requirements  Today's Visits  Date Type Provider Dept   02/02/22 Office Visit [facility] PARTIAL PSYCH GROUP THERAPY [facility]   Showing today's visits and meeting all other requirements  Future Appointments  No visits were found meeting these conditions  Showing future appointments within next 150 days and meeting all other requirements       The patient was identified by name and date of birth  Aundrea Kirsten was informed that this is a telemedicine visit and that the visit is being conducted throughMicrosoft Teams and patient was informed that this is a secure, HIPAA-compliant platform  She agrees to proceed     My office door was closed  No one else was in the room    She acknowledged consent and understanding of privacy and security of the video platform  The patient has agreed to participate and understands they can discontinue the visit at any time  Patient is aware this is a billable service  Subjective  Jackelyn Goldman is a 43 y o  female    HPI     Past Medical History:   Diagnosis Date    Anxiety     Borderline [facility] disorder [facility] [address] [facility]     Depression     Psychiatric illness     Self-injurious behavior     Suicide attempt ([facility] )        No past surgical history on file  Current Outpatient Medications   Medication Sig Dispense Refill    albuterol (PROVENTIL HFA,VENTOLIN HFA) 90 mcg/act inhaler Inhale 2 puffs every 4 (four) hours as needed for wheezing (Patient not taking: Reported on 1/27/2022 ) 8 g 0    busPIRone (BUSPAR) 30 MG tablet Take 30 mg by mouth 2 (two) times a day      cyanocobalamin (VITAMIN B-12) 100 mcg tablet Take 1 tablet by mouth every 24 hours      escitalopram (LEXAPRO) 20 mg tablet Take 1 tablet (20 mg total) by mouth daily With 10 mg 30 tablet 0    eszopiclone (LUNESTA) 2 mg tablet Take 1 tablet (2 mg total) by mouth daily at bedtime as needed for sleep Take immediately before bedtime (Patient taking differently: Take 1 mg by mouth daily at bedtime as needed for sleep Take immediately before bedtime ) 30 tablet 1    QUEtiapine (SEROquel) 100 mg tablet Take 100 mg by mouth daily at bedtime       No current facility-administered medications for this visit  Allergies   Allergen Reactions    Ceclor [Cefaclor] Hives    Nuts - Food Allergy GI Intolerance    Latex Rash       Review of Systems    Video Exam    There were no vitals filed for this visit  Physical Exam     I spent FOUR GROUP HOURS PLUS CASE MANAGEMENT minutes with patient today in which greater than 50% of the time was spent in counseling/coordination of care regarding PHP - SEE NOTES  VIRTUAL VISIT DISCLAIMER    Chris Lobo verbally agrees to participate in [employer]   Pt is aware that [employer] could be limited without vital signs or the ability to perform a full hands-on physical exam  Jackelyn Contreras understands she or the provider may request at any time to terminate the video visit and request the patient to seek care or treatment in person

## 2022-02-03 ENCOUNTER — OFFICE VISIT (OUTPATIENT)
Dept: PSYCHOLOGY | Facility: CLINIC | Age: 37
End: 2022-02-03
Payer: COMMERCIAL

## 2022-02-03 DIAGNOSIS — F32.81 PMDD (PREMENSTRUAL DYSPHORIC DISORDER): ICD-10-CM

## 2022-02-03 DIAGNOSIS — F33.2 SEVERE EPISODE OF RECURRENT MAJOR DEPRESSIVE DISORDER, WITHOUT PSYCHOTIC FEATURES (HCC): ICD-10-CM

## 2022-02-03 DIAGNOSIS — F41.1 GENERALIZED ANXIETY DISORDER: Primary | ICD-10-CM

## 2022-02-03 PROCEDURE — G0410 GRP PSYCH PARTIAL HOSP 45-50: HCPCS

## 2022-02-03 PROCEDURE — G0177 OPPS/PHP; TRAIN & EDUC SERV: HCPCS

## 2022-02-03 PROCEDURE — H0035 MH PARTIAL HOSP TX UNDER 24H: HCPCS

## 2022-02-03 NOTE — PSYCH
Subjective:     Patient ID: Aaron Shelton is a 43 y o  female  Innovations Clinical Progress Notes      Specialized Services Documentation  Therapist must complete separate progress note for each specific clinical activity in which the individual participated during the day  Allied Therapy   This group was facilitated virtually in a private office using HIPAA Compliant and Approved [employer] Teams  Aaron Shelton consented to the use of tele-video modality of treatment  [id number] Aaron Shelton  actively shared in [facility] group exploring DBT distress tolerance crisis survival strategies  Group explored crisis survival strategies ACCEPTS, TIP, Pros and Cons, and STOP) reinforcing actions one could take to learn to tolerate stressful experiences, thoughts and urges  Camrynmadai Carnes gave examples of sharing these skills with others and hopeful outcomes  She felt she could put effort into practicing ACCEPTS  Some progress toward goal noted  Continue AT to encourage learning, practice and home practice of skills to manage distress        Tx Plan Objective: 1 1, Therapist:  Amber BERGMAN

## 2022-02-03 NOTE — PSYCH
Subjective:     Patient ID: Yanely Romero is a 43 y o  female  In[facility]ations Clinical Progress Notes      Specialized Services Documentation  Therapist must complete separate progress note for each specific clinical activity in which the individual participated during the day  Group Psychotherapy  This group was facilitated virtually in a private office using HIPAA Compliant and Approved Microsoft Teams  Yanely Romero  consented to the use of tele-video modality of treatment  ([id number]) Yanely Romero actively participated in psychoeducation group this afternoon which focused on sleep hygiene  Group shared current barriers contributing to decreased quality of sleep  Group then identified sleep hygiene habits that are currently effective and habits they wish to incorporate into their night time routine to promote sleep hygiene  This writer explained the importance of quality sleep in relation to wellness  Sleep diary and additional tips on sleep hygiene were discussed  The group then listened to a song weightless" to demonstrate how music can be an effective tool to aid in sleep  Good progress toward goal observed  Continue psychoeducation group to increase awareness of good sleeping habits to promote wellness  Tx Plan Objectives: 1 1, 1 2      Marietta Room BSN RN     Education Therapy   [id number] Yanely Romero actively shared in morning assessment and goal review  Presented as Receptive related to readiness to learn  Yanely Romero did complete goal from last treatment day identifying gaining responsibility, hope and support  did not present with any barriers to learning  Chaparritamariana Gonzaleznancy reports she is feeling much better today and was able to complete several other tasks last evening besides her goal     [id number] Yanely Romero engaged throughout the treatment day  Was engaged in learning related to Illness, Medication, Aftercare, and Wellness Tools   Staff utilized Verbal, Written, A/V, and Demonstration teaching methods  Travis Lilly shared area of learning and set a goal for outside of program to complete sensations from distress tolerence  Tx Plan Objective: 1 1,1 2,1 4 Therapist:  Tyler SOHRE RN    Case Management Note    Tyler SHORE RN    Current suicide risk : Low     A case management session is not scheduled today with Travis Lilly ; additionally, they did not request a CM meeting  Next scheduled session is Friday, 02/04/2022  Medications changes/added/denied? No    Treatment session number: 7    Individual Case Management Visit provided today?  No    [facility]novations follow up physician's orders: None at this time

## 2022-02-03 NOTE — PSYCH
Subjective:     Patient ID: Tiffanie Castillo is a 43 y o  female  In[facility]ations Clinical Progress Notes      Specialized Services Documentation  Therapist must complete separate progress note for each specific clinical activity in which the individual participated during the day  Group Psychotherapy Life Skills ([id number]) Tiffanie Castillo actively engaged in group focused on hope which was facilitated virtually in a private office using HIPAA Compliant and Approved [employer] Teams  Tiffanie Castillo consented to the use of tele-video modality of treatment and was virtually present for group psychotherapy today  Group watched a short video, How to overcome feelings of hopelessness, and discussed the 3 main points in the video  Group members discussed what hope is to them and what causes them to lose hope  Group members were asked to think about what symbolizes hope for them  Clients ck pictures and shared what their symbol of hope is and what it means to them  [place] declined to share her symbol   Clients discussed ways to cultivate hope in their life  Tiffanie Castillo continues to make progress towards goals through verbal participation in group; to accomplish long term goals continue to utilize skills learned in programming  Continue with psychotherapy to educate and encourage use of wellness tools   Tx Plan Objective: 1 1,1 2 Therapist: Syed Root

## 2022-02-03 NOTE — PSYCH
Virtual Regular Visit    Verification of patient location:    Patient is located in the following state in which I hold an active license [place]      Assessment/Plan:    Problem List Items Addressed This Visit        Other    Severe episode of recurrent major depressive disorder, without psychotic features ([facility] [place] [address] )    Generalized anxiety disorder - Primary    PMDD (premenstrual dysphoric disorder)          Goals addressed in session:           Reason for visit is      Encounter provider Mona Cornell    Provider located at [address] [place] [phone]      Recent Visits  Date Type Provider Dept   02/02/22 Office Visit [facility] PARTIAL PSYCH GROUP THERAPY [facility]   02/01/22 Office Visit [facility] PARTIAL PSYCH GROUP THERAPY [facility]   01/31/22 Office Visit [facility] PARTIAL PSYCH GROUP THERAPY [facility]   01/28/22 Office Visit [facility] PARTIAL PSYCH GROUP TH[facility]APY [facility]   Showing recent visits within past 7 days and meeting all other requirements  Today's Visits  Date Type Provider Dept   02/03/22 Office Visit [facility] PARTIAL PSYCH GROUP THERAPY [facility]   Showing today's visits and meeting all other requirements  Future Appointments  No visits were found meeting these conditions  Showing future appointments within next 150 days and meeting all other requirements       The patient was identified by name and date of birth  Solmon Cranker was informed that this is a telemedicine visit and that the visit is being conducted throughMicrosoft Teams and patient was informed that this is a secure, HIPAA-compliant platform  She agrees to proceed     My office door was closed  No one else was in the room  She acknowledged consent and understanding of privacy and security of the video platform   The patient has agreed to participate and understands they can discontinue the visit at any time  Patient is aware this is a billable service  Subjective  Jackelyn Pizarro is a 43 y o  female   HPI     Past Medical History:   Diagnosis Date    Anxiety     Borderline personality disorder ([facility] [place] [address] )     Depression     Psychiatric illness     Self-injurious behavior     Suicide attempt ([facility] )        No past surgical history on file  Current Outpatient Medications   Medication Sig Dispense Refill    albuterol (PROVENTIL HFA,VENTOLIN HFA) 90 mcg/act inhaler Inhale 2 puffs every 4 (four) hours as needed for wheezing (Patient not taking: Reported on 1/27/2022 ) 8 g 0    busPIRone (BUSPAR) 30 MG tablet Take 30 mg by mouth 2 (two) times a day      cyanocobalamin (VITAMIN B-12) 100 mcg tablet Take 1 tablet by mouth every 24 hours      escitalopram (LEXAPRO) 20 mg tablet Take 1 tablet (20 mg total) by mouth daily With 10 mg 30 tablet 0    eszopiclone (LUNESTA) 2 mg tablet Take 1 tablet (2 mg total) by mouth daily at bedtime as needed for sleep Take immediately before bedtime (Patient taking differently: Take 1 mg by mouth daily at bedtime as needed for sleep Take immediately before bedtime ) 30 tablet 1    QUEtiapine (SEROquel) 100 mg tablet Take 100 mg by mouth daily at bedtime       No current facility-administered medications for this visit  Allergies   Allergen Reactions    Ceclor [Cefaclor] Hives    Nuts - Food Allergy GI Intolerance    Latex Rash       Review of Systems    Video Exam    There were no vitals filed for this visit  Physical Exam     I spent FOUR GROUP HOURS PLUS CASE MANAGEMENT minutes with patient today in which greater than 50% of the time was spent in counseling/coordination of care regarding PHP - SEE NOTES  VIRTUAL VISIT DISCLAIMER    Kevin Link verbally agrees to participate in [employer]   Pt is aware that [employer] could be limited without vital signs or the ability to perform a full hands-on physical simone Garsia understands she or the provider may request at any time to terminate the video visit and request the patient to seek care or treatment in person

## 2022-02-04 ENCOUNTER — OFFICE VISIT (OUTPATIENT)
Dept: PSYCHOLOGY | Facility: CLINIC | Age: 37
End: 2022-02-04
Payer: COMMERCIAL

## 2022-02-04 DIAGNOSIS — F33.2 SEVERE EPISODE OF RECURRENT MAJOR DEPRESSIVE DISORDER, WITHOUT PSYCHOTIC FEATURES (HCC): ICD-10-CM

## 2022-02-04 DIAGNOSIS — F41.1 GENERALIZED ANXIETY DISORDER: Primary | ICD-10-CM

## 2022-02-04 DIAGNOSIS — F32.81 PMDD (PREMENSTRUAL DYSPHORIC DISORDER): ICD-10-CM

## 2022-02-04 PROCEDURE — G0176 OPPS/PHP;ACTIVITY THERAPY: HCPCS

## 2022-02-04 PROCEDURE — G0177 OPPS/PHP; TRAIN & EDUC SERV: HCPCS

## 2022-02-04 PROCEDURE — G0410 GRP PSYCH PARTIAL HOSP 45-50: HCPCS

## 2022-02-04 NOTE — PSYCH
Subjective:     Patient ID: Wilfredo Shea is a 43 y o  female  In[facility]ations Clinical Progress Notes      Specialized Services Documentation  Therapist must complete separate progress note for each specific clinical activity in which the individual participated during the day  Education Therapy   [id number] Wilfredo Shea actively shared in morning assessment and goal review  Presented as Receptive related to readiness to learn  Wilfredo Shea did complete goal from last treatment day identifying gaining hope, responsibility, support, and advocacy  did not present with any barriers to learning  [address] engaged throughout the treatment day  Was engaged in learning related to Illness, Medication, Aftercare, and Wellness Tools  Staff utilized Verbal, Written, A/V, and Demonstration teaching methods  Wilfredo Shea shared area of learning and set a goal for outside of program to pick a pleasant event and go to a flee market or [employer] shopping  Tx Plan Objective: 1 1,1 2,1 4 Therapist:  Doris WHEELERN RN       Case Management Note  This case management session was facilitated virtually in a private office using HIPAA Compliant and Approved Microsoft Teams  Wilfredo Shea consented to the use of tele-video modality of treatment  Doris Milligan, RN, BSN    Current suicide risk : Low     5942-0422 Met with Wilfredo Shea  Reported she is feeling more positive, does not have any self harm or SI  Progress noted in that Emanuelalex Davies was able to spend 10 minutes completing and exercise where she had productive anger for 10 minutes  She felt this was a good exercise and had support from her   Emanuel Davies is putting together schedule of needs to do daily and weekly to help herself remain well  Her support this weekend is her  and friend Vonnie Huizar  They are going to thrift shops and flee markets  Medications changes/added/denied? No    Treatment session number: 8    Individual Case Management Visit provided today?  Yes     Innovations follow up physician's orders: No new orders

## 2022-02-04 NOTE — PSYCH
Virtual Regular Visit    Verification of patient location:    Patient is located in the following state in which I hold an active license [place]      Assessment/Plan:    Problem List Items Addressed This Visit        Other    Severe episode of recurrent major depressive disorder, without psychotic features ([facility] [address] )    Generalized anxiety disorder - Primary    PMDD (premenstrual dysphoric disorder)          Goals addressed in session:           Reason for visit is PHP VIRTUAL GROUP DUE TO COVID-19      Encounter provider [address] PARTIAL [address]     Provider located at [address] [place] [phone]      Recent Visits  Date Type Provider Dept   02/03/22 Office Visit [address] PARTIAL PSYCH GROUP THERAPY [facility]   02/02/22 Office Visit [address] PARTIAL PSYCH GROUP THERAPY [facility]   02/01/22 Office Visit [address] PARTIAL PSYCH GROUP THERAPY [facility]   01/31/22 Office Visit [address] PARTIAL PSYCH GROUP THERAPY [facility]   01/28/22 Office Visit [address] PARTIAL PSYCH GROUP THERAPY [facility]   Showing recent visits within past 7 days and meeting all other requirements  Today's Visits  Date Type Provider Dept   02/04/22 Office Visit [address] PARTIAL PSYCH GROUP THERAPY [facility]   Showing today's visits and meeting all other requirements  Future Appointments  No visits were found meeting these conditions  Showing future appointments within next 150 days and meeting all other requirements       The patient was identified by name and date of birth  Mayte Glass was informed that this is a telemedicine visit and that the visit is being conducted throughMicrosoft Teams and patient was informed that this is a secure, HIPAA-compliant platform  She agrees to proceed     My office door was closed  No one else was in the room    She acknowledged consent and understanding of privacy and security of the video platform  The patient has agreed to participate and understands they can discontinue the visit at any time  Patient is aware this is a billable service  Subjective  Jackelyn Harding is a 43 y o  female   HPI     Past Medical History:   Diagnosis Date    Anxiety     Borderline [facility] disorder ([facility] [address] )     Depression     Psychiatric illness     Self-injurious behavior     Suicide attempt ([facility] )        No past surgical history on file  Current Outpatient Medications   Medication Sig Dispense Refill    albuterol (PROVENTIL HFA,VENTOLIN HFA) 90 mcg/act inhaler Inhale 2 puffs every 4 (four) hours as needed for wheezing (Patient not taking: Reported on 1/27/2022 ) 8 g 0    busPIRone (BUSPAR) 30 MG tablet Take 30 mg by mouth 2 (two) times a day      cyanocobalamin (VITAMIN B-12) 100 mcg tablet Take 1 tablet by mouth every 24 hours      escitalopram (LEXAPRO) 20 mg tablet Take 1 tablet (20 mg total) by mouth daily With 10 mg 30 tablet 0    eszopiclone (LUNESTA) 2 mg tablet Take 1 tablet (2 mg total) by mouth daily at bedtime as needed for sleep Take immediately before bedtime (Patient taking differently: Take 1 mg by mouth daily at bedtime as needed for sleep Take immediately before bedtime ) 30 tablet 1    QUEtiapine (SEROquel) 100 mg tablet Take 100 mg by mouth daily at bedtime       No current facility-administered medications for this visit  Allergies   Allergen Reactions    Ceclor [Cefaclor] Hives    Nuts - Food Allergy GI Intolerance    Latex Rash       Review of Systems    Video Exam    There were no vitals filed for this visit  Physical Exam     I spent FOUR GROUP HOURS PLUS CASE MANAGEMENT minutes with patient today in which greater than 50% of the time was spent in counseling/coordination of care regarding PHP - SEE NOTES  VIRTUAL VISIT DISCLAIMER    Jeremy Fabian verbally agrees to participate in [employer]   Pt is aware that [facility] could be limited without vital signs or the ability to perform a full hands-on physical exam  Jackelyn San Richard understands she or the provider may request at any time to terminate the video visit and request the patient to seek care or treatment in person

## 2022-02-04 NOTE — PSYCH
Subjective:     Patient ID: Adalberto Herrera is a 43 y o  female  In[facility]ations Clinical Progress Notes      Specialized Services Documentation  Therapist must complete separate progress note for each specific clinical activity in which the individual participated during the day  GROUP PSYCHOTHERAPY ([id number]) Group was facilitated virtually in a private office using HIPAA Compliant and Approved Microsoft Teams  Ishan Galindo consented to the use of tele-video modality of treatment and was virtually present for group psychotherapy today  The group read a mindfulness prayer called Ivette Butt as a way to encourage members to practice journaling, reflecting, and thinking about gratitude and living in the present  They then spent three minutes reflecting and answered the following questions:     Cleansing Prayer  May I release all energies that are less than love  Help free my mind and body of all that no longer serves me  I send back any energies that arent mine, with love  I anchor into myself and the present moment      1  What feelings/thoughts came up in you as you read the Cleansing Prayer? 2  What do you find to be the most challenging about the Cleansing Prayer? Derickdulce Galindo seemed fairly engaged throughout the group session  Ishan Galindo stated that the challenging part of the prayer is, Letting go in general is hard for me  Ishan Galindo is encouraged to make progress towards goals and objectives through group participation and will continue to attend psychotherapy group       Tx plan objective: 1 1, 1 2   Therapist: Rosina Castrejon MA

## 2022-02-04 NOTE — PSYCH
Subjective:     Patient ID: Aundrea Curtis is a 43 y o  female  In[facility]ations Clinical Progress Notes      Specialized Services Documentation  Therapist must complete separate progress note for each specific clinical activity in which the individual participated during the day  Allied Therapy   This group was facilitated virtually in a private office using HIPAA Compliant and Approved [employer] Teams  Aundrea Curtis consented to the use of tele-video modality of treatment  [id number] Aundrea Curtis  actively shared in [facility] group focused on DBT emotional regulation skill accumulating positive emotion and weekend planning  Drea Padilla engaged in discussion and was attentive during task  Group emphasized participating and focusing on positive tasks to change uncomfortable emotions (active versus passive process)  She was given Pleasant Events List and shared she could go to the [employer] to work on increasing pleasant events this weekend  Object meditation introduced and practiced  Some exploration of goal observed and shared  Continue AT to encourage the awareness of healthy options for unstructured time; strategies and personal application        Tx Plan Objective: 1 1, 1 4 Therapist:  Lalo BERGMAN

## 2022-02-04 NOTE — PSYCH
Subjective:     Patient ID: Zoë Jacob is a 43 y o  female  Innovations Clinical Progress Notes      Specialized Services Documentation  Therapist must complete separate progress note for each specific clinical activity in which the individual participated during the day  Group Psychotherapy Life Skills (9470-2522) Zoë Jacob actively engaged in group focused on cognitive distortions and restructuring their cognitive distortions which was facilitated virtually in a private office using HIPAA Compliant and Approved [employer] Teams  Patricio Benito consented to the use of tele-video modality of treatment and was virtually present for group psychotherapy today  The group learned about cognitive distortions  During the activity clients identified their irrational thoughts, one or more of the type of cognitive distortion that they demonstrated, and a rational alternative  Patricio Benito identified a thought that she had and stated that this demonstrated the cognitive distortion of  Over generalization and labelings  Group members were asked to keep a cognitive distortion log and identify one thought, the distortion and a rational alternative at least once per day  Group members learned about thought records which is another tool that can be used to help restructure their irrational thoughts  Patricio Benito continues to make progress towards goals through verbal participation in group; to accomplish long term goals continue to utilize skills learned in programming  Continue with psychotherapy to educate and encourage use of wellness tools   Tx Plan Objective: 1 1,1 2 Therapist: Bright Mejia

## 2022-02-07 ENCOUNTER — OFFICE VISIT (OUTPATIENT)
Dept: PSYCHOLOGY | Facility: CLINIC | Age: 37
End: 2022-02-07
Payer: COMMERCIAL

## 2022-02-07 DIAGNOSIS — G47.00 INSOMNIA, UNSPECIFIED TYPE: ICD-10-CM

## 2022-02-07 DIAGNOSIS — F33.2 SEVERE EPISODE OF RECURRENT MAJOR DEPRESSIVE DISORDER, WITHOUT PSYCHOTIC FEATURES (HCC): ICD-10-CM

## 2022-02-07 DIAGNOSIS — F41.1 GENERALIZED ANXIETY DISORDER: Primary | ICD-10-CM

## 2022-02-07 PROCEDURE — G0410 GRP PSYCH PARTIAL HOSP 45-50: HCPCS

## 2022-02-07 PROCEDURE — G0177 OPPS/PHP; TRAIN & EDUC SERV: HCPCS

## 2022-02-07 PROCEDURE — G0176 OPPS/PHP;ACTIVITY THERAPY: HCPCS

## 2022-02-07 NOTE — PSYCH
Subjective:     Patient ID: Aundrea Curtis is a 43 y o  female  In[facility]ations Clinical Progress Notes      Specialized Services Documentation  Therapist must complete separate progress note for each specific clinical activity in which the individual participated during the day  Group Psychotherapy Life Skills (0567-2857) Aundrea Curtis actively engaged in an open processing group which was facilitated virtually in a private office using HIPAA Compliant and Approved [employer] Teams  Drea Dao consented to the use of tele-video modality of treatment and was virtually present for group psychotherapy today  The group discussed the feelings of "faking it", feeling information overload, advocating for themselves, feeling like they are being judged, and the importance of practicing the coping skills they learned in group  Drea Padilla did share her thoughts related to the topics  Drea Padilla continues to make progress towards goals through verbal participation in group; to accomplish long term goals continue to utilize skills learned in programming  Continue with psychotherapy to educate and encourage use of wellness tools   Tx Plan Objective: 1 1,1 2 Therapist: Catrina Villasenor

## 2022-02-07 NOTE — PSYCH
Subjective:     Patient ID: Yanely Romero is a 43 y o  female  Innovations Clinical Progress Notes      Specialized Services Documentation  Therapist must complete separate progress note for each specific clinical activity in which the individual participated during the day  GROUP PSYCHOTHERAPY (8326-8823) Group was facilitated virtually in a private office using HIPAA Compliant and Approved Microsoft Teams  Erica Yan consented to the use of tele-video modality of treatment and was virtually present for group psychotherapy today  The group engaged in education about depression and different ways they can seek support  The Supporting Someone with Depression worksheet presents an overview of depression, along with ways members can advocate for themselves and others who may be struggling with the illness  Group members were then asked the following questions:  1  What are some of the challenges you have faced regarding support from others? 2  Which worksheet tip might you try out with someone else? Erica Yan seemed very engaged throughout the group session  Erica Yan stated that a challenge they have experienced regarding support is "My family doesn't know how to help " Erica Yan is encouraged to make progress towards goals and objectives through group participation and will continue to attend psychotherapy group       Tx plan objective: 1 1, 1 2   Therapist: Sue Potter MA

## 2022-02-07 NOTE — PSYCH
Virtual Regular Visit    Verification of patient location:    Patient is located in the following state in which I hold an active license [place]      Assessment/Plan:    Problem List Items Addressed This Visit        Other    Severe episode of recurrent major depressive disorder, without psychotic features ([facility] )    Generalized anxiety disorder - Primary    Insomnia          Goals addressed in session:           Reason for visit is PHP VIRTUAL GROUP DUE TO COVID-19      Encounter provider [address] PARTIAL [address]    Provider located at [address] [place] [id number]      Recent Visits  Date Type Provider Dept   02/04/22 Office Visit [address] PARTIAL PSYCH GROUP THERAPY [facility]   02/03/22 Office Visit [address] PARTIAL PSYCH GROUP THERAPY [facility]   02/02/22 Office Visit [address] PARTIAL PSYCH GROUP THERAPY [facility]   02/01/22 Office Visit [address] PARTIAL PSYCH GROUP THERAPY [facility]   01/31/22 Office Visit [address] PARTIAL PSYCH GROUP THERAPY [facility]   Showing recent visits within past 7 days and meeting all other requirements  Today's Visits  Date Type Provider Dept   02/07/22 Office Visit [address] PARTIAL PSYCH GROUP THERAPY [facility]   Showing today's visits and meeting all other requirements  Future Appointments  No visits were found meeting these conditions  Showing future appointments within next 150 days and meeting all other requirements       The patient was identified by name and date of birth  Wilfredo Seal was informed that this is a telemedicine visit and that the visit is being conducted throughMicrosoft Teams and patient was informed that this is a secure, HIPAA-compliant platform  She agrees to proceed     My office door was closed  No one else was in the room  She acknowledged consent and understanding of privacy and security of the video platform   The patient has agreed to participate and understands they can discontinue the visit at any time  Patient is aware this is a billable service  Subjective  Jackelyn Andersen is a 43 y o  female   HPI     Past Medical History:   Diagnosis Date    Anxiety     Borderline personality disorder ([facility] )     Depression     Psychiatric illness     Self-injurious behavior     Suicide attempt ([facility] )        No past surgical history on file  Current Outpatient Medications   Medication Sig Dispense Refill    albuterol (PROVENTIL HFA,VENTOLIN HFA) 90 mcg/act inhaler Inhale 2 puffs every 4 (four) hours as needed for wheezing (Patient not taking: Reported on 1/27/2022 ) 8 g 0    busPIRone (BUSPAR) 30 MG tablet Take 30 mg by mouth 2 (two) times a day      cyanocobalamin (VITAMIN B-12) 100 mcg tablet Take 1 tablet by mouth every 24 hours      escitalopram (LEXAPRO) 20 mg tablet Take 1 tablet (20 mg total) by mouth daily With 10 mg 30 tablet 0    eszopiclone (LUNESTA) 2 mg tablet Take 1 tablet (2 mg total) by mouth daily at bedtime as needed for sleep Take immediately before bedtime (Patient taking differently: Take 1 mg by mouth daily at bedtime as needed for sleep Take immediately before bedtime ) 30 tablet 1    QUEtiapine (SEROquel) 100 mg tablet Take 100 mg by mouth daily at bedtime       No current facility-administered medications for this visit  Allergies   Allergen Reactions    Ceclor [Cefaclor] Hives    Nuts - Food Allergy GI Intolerance    Latex Rash       Review of Systems    Video Exam    There were no vitals filed for this visit  Physical Exam     I spent FOUR GROUP HOURS PLUS CASE MANAGEMENT minutes with patient today in which greater than 50% of the time was spent in counseling/coordination of care regarding PHP - SEE NOTES  VIRTUAL VISIT DISCLAIMER    Zoë Cecil verbally agrees to participate in [facility]   Pt is aware that Virtual Care Services could be limited without vital signs or the ability to perform a full hands-on physical exam  Jackelyn Waite understands she or the provider may request at any time to terminate the video visit and request the patient to seek care or treatment in person

## 2022-02-07 NOTE — PSYCH
Subjective:     Patient ID: Emerson Canseco is a 43 y o  female  In[facility]ations Clinical Progress Notes      Specialized Services Documentation  Therapist must complete separate progress note for each specific clinical activity in which the individual participated during the day  Group Psychotherapy  This group was facilitated virtually in a private office using HIPAA Compliant and Approved Microsoft Teams  Emerson Canseco  consented to the use of tele-video modality of treatment  ([id number]) Emerson Canseco actively shared in psychoeducational group which focused on nutrition to improve physical and mental wellbeing  Topics included appropriate serving sizes for all food groups as well as benefits to eating for health  The group then completed a nutrition themed jeopardy to reinforce teaching  They then discussed ideas on how to improve on their nutrition  Good progress toward goal noted  Continue psychoeducation to educate on the importance of making nutrition a priority  Tx Plan Objectives: 1 1,1 2    Therapist: Fredy SHORE RN      Education Therapy   [id number] Emerson Canseco actively shared in morning assessment and goal review  Presented as Receptive related to readiness to learn  Emerson Canseco did complete goal from last treatment day identifying gaining hope, advocacy, responsibility and suport  did not present with any barriers to learning  [address] engaged throughout the treatment day  Was engaged in learning related to Illness, Medication, Aftercare, and Wellness Tools  Staff utilized Verbal, Written, A/V, and Demonstration teaching methods  Emerson Canseco shared area of learning and set a goal for outside of program to shower closer to bedtime to promote sleep        Tx Plan Objective: 1 1,1 2,1 4 Therapist:  Fredy SHORE RN     Case Management Note    Fredy SHORE RN    Current suicide risk : Low     A case management session is not scheduled today with Almarie Shape ; additionally, they did not request a CM meeting  Next scheduled session is Tuesday, February 8  Medications changes/added/denied? No    Treatment session number: 9    Individual Case Management Visit provided today?  No    Innovations follow up physician's orders: None at this time

## 2022-02-08 ENCOUNTER — APPOINTMENT (OUTPATIENT)
Dept: PSYCHOLOGY | Facility: CLINIC | Age: 37
End: 2022-02-08
Payer: COMMERCIAL

## 2022-02-08 ENCOUNTER — DOCUMENTATION (OUTPATIENT)
Dept: PSYCHOLOGY | Facility: CLINIC | Age: 37
End: 2022-02-08

## 2022-02-08 NOTE — PROGRESS NOTES
Subjective:     Patient ID: Venancio Adam is a 43 y o  female  In[facility]ations Clinical Progress Notes      Specialized Services Documentation  Therapist must complete separate progress note for each specific clinical activity in which the individual participated during the day  Case Management Note  This case management session was facilitated virtually in a private office using HIPAA Compliant and Approved Microsoft Teams  Venancio Adam consented to the use of tele-video modality of treatment  Jelena Rios, RN, BSN    Current suicide risk : Low     [id number] Met with Venancio Adam  Reported she was feeling tired and run down today  Denies thoughts of self harm or SI/HI and has a safety plan in place if she does have those thoughts  Attila Nance stated she was feeling behind from doing a lot this week  Attila Nance is excused from [facility] today  Medications changes/added/denied? No    Treatment session number: n/a    Individual Case Management Visit provided today?  Yes     Innovations follow up physician's orders: no new orders

## 2022-02-09 ENCOUNTER — OFFICE VISIT (OUTPATIENT)
Dept: PSYCHOLOGY | Facility: CLINIC | Age: 37
End: 2022-02-09
Payer: COMMERCIAL

## 2022-02-09 ENCOUNTER — TELEMEDICINE (OUTPATIENT)
Dept: PSYCHIATRY | Facility: CLINIC | Age: 37
End: 2022-02-09
Payer: COMMERCIAL

## 2022-02-09 DIAGNOSIS — F31.32 BIPOLAR AFFECTIVE DISORDER, CURRENTLY DEPRESSED, MODERATE (HCC): ICD-10-CM

## 2022-02-09 DIAGNOSIS — G47.00 INSOMNIA, UNSPECIFIED TYPE: ICD-10-CM

## 2022-02-09 DIAGNOSIS — F41.1 GENERALIZED ANXIETY DISORDER: ICD-10-CM

## 2022-02-09 DIAGNOSIS — F33.2 SEVERE EPISODE OF RECURRENT MAJOR DEPRESSIVE DISORDER, WITHOUT PSYCHOTIC FEATURES (HCC): Primary | ICD-10-CM

## 2022-02-09 DIAGNOSIS — F32.81 PMDD (PREMENSTRUAL DYSPHORIC DISORDER): ICD-10-CM

## 2022-02-09 DIAGNOSIS — F41.1 GENERALIZED ANXIETY DISORDER: Primary | ICD-10-CM

## 2022-02-09 DIAGNOSIS — F33.2 SEVERE EPISODE OF RECURRENT MAJOR DEPRESSIVE DISORDER, WITHOUT PSYCHOTIC FEATURES (HCC): ICD-10-CM

## 2022-02-09 PROCEDURE — 99213 OFFICE O/P EST LOW 20 MIN: CPT | Performed by: PHYSICIAN ASSISTANT

## 2022-02-09 PROCEDURE — G0176 OPPS/PHP;ACTIVITY THERAPY: HCPCS

## 2022-02-09 PROCEDURE — G0177 OPPS/PHP; TRAIN & EDUC SERV: HCPCS

## 2022-02-09 PROCEDURE — G0410 GRP PSYCH PARTIAL HOSP 45-50: HCPCS

## 2022-02-09 RX ORDER — ESCITALOPRAM OXALATE 20 MG/1
20 TABLET ORAL DAILY
Qty: 30 TABLET | Refills: 1 | Status: SHIPPED | OUTPATIENT
Start: 2022-02-09 | End: 2022-03-24 | Stop reason: SDUPTHER

## 2022-02-09 RX ORDER — QUETIAPINE 150 MG/1
150 TABLET, FILM COATED, EXTENDED RELEASE ORAL
Qty: 30 TABLET | Refills: 0 | Status: SHIPPED | OUTPATIENT
Start: 2022-02-09 | End: 2022-02-23 | Stop reason: SDUPTHER

## 2022-02-09 RX ORDER — BUSPIRONE HYDROCHLORIDE 30 MG/1
30 TABLET ORAL 2 TIMES DAILY
Qty: 60 TABLET | Refills: 1 | Status: SHIPPED | OUTPATIENT
Start: 2022-02-09 | End: 2022-03-24 | Stop reason: SDUPTHER

## 2022-02-09 NOTE — PSYCH
Subjective:     Patient ID: Mayte Glass is a 43 y o  female  In[facility]ations Clinical Progress Notes      Specialized Services Documentation  Therapist must complete separate progress note for each specific clinical activity in which the individual participated during the day  Allied Therapy   This group was facilitated virtually in a private office using HIPAA Compliant and Approved Microsoft Teams  Mayte Glass consented to the use of tele-video modality of treatment  [id number] Mayte Glass  actively shared in [facility] group focused on assertiveness  Exercise explored communication styles and value of assertiveness as discussion focused on ways to increase assertiveness  ORLÉANS shared aspects of styles and made connections to the impact of her illness on how she communicates  Group discussed impact on treatment and ways to increase assertive behavior in getting needs met  Some positive effort noted toward goal   Continue AT to explore wellness strategies and encourage personal practice  Tx Plan Objective: 1 1,1 4, Therapist:  [place] BERGMAN    Education Therapy   [id number] Mayte Glass actively shared in morning assessment and goal review  Presented as Receptive related to readiness to learn  Mayte Glass did complete goal from last treatment day identifying gaining advocacy  did not present with any barriers to learning  [address] engaged throughout the treatment day  Was engaged in learning related to Illness, Aftercare and Wellness Tools  Staff utilized Verbal, A/V and Demonstration teaching methods  Mayte Glass shared area of learning and set a goal for outside of program to organize her notes        Tx Plan Objective: 1 1, Therapist:  [place] BERGMAN

## 2022-02-09 NOTE — PSYCH
Virtual Regular Visit    Verification of patient location:    Patient is located in the following state in which I hold an active license [place]               Reason for visit is   Chief Complaint   Patient presents with    Virtual Regular Visit        Encounter provider Vi Townsend PA-C    Provider located at [address] [address] [phone]      Recent Visits  Date Type Provider Dept   02/02/22 Telemedicine Vi Townsend PA-C [facility] recent visits within past 7 days and meeting all other requirements  Future Appointments  No visits were found meeting these conditions  Showing future appointments within next 150 days and meeting all other requirements       The patient was identified by name and date of birth  Jeremy Fabian was informed that this is a telemedicine visit and that the visit is being conducted throughMicrosoft Teams and patient was informed that this is a secure, HIPAA-compliant platform  She agrees to proceed     My office door was closed  The patient was notified the following individuals were present in the room BRAD JOY JOSELINE VERA  She acknowledged consent and understanding of privacy and security of the video platform  The patient has agreed to participate and understands they can discontinue the visit at any time  Patient is aware this is a billable service  VIRTUAL VISIT DISCLAIMER    Jeremy Fabian verbally agrees to participate in [employer]  Pt is aware that [employer] could be limited without vital signs or the ability to perform a full hands-on physical exam  Jackelyn Harding understands she or the provider may request at any time to terminate the video visit and request the patient to seek care or treatment in person          Progress Note - Behavioral Health   [facility]  Yvonne Lundborg ELIZABETH 43 y o  female MRN: [id number]     Progress at partial program: improving  Garrison Lies seen by Min 2/2 at which time meds unchanged  Garrison Lies still with difficulty staying asleep  Unable to afford lunesta  Intermittent snoring but no witnessed apneas  Yesterday had period of feeling very overwhelmed with "all I had to do"  Used coping tools successfully  Overall coping better with anxiety and mood improved               ROS:   As above otherwise negative    Active Ambulatory Problems     Diagnosis Date Noted    Severe episode of recurrent major depressive disorder, without psychotic features ([facility] ) 12/07/2016    Generalized anxiety disorder 12/07/2016    Candida rash of groin 11/04/2021    Borderline personality disorder ([place] [address] )     PMDD (premenstrual dysphoric disorder) 11/18/2021    Insomnia 12/10/2021     Resolved Ambulatory Problems     Diagnosis Date Noted    Medical clearance for psychiatric admission 11/04/2021    Bronchitis 11/04/2021     Past Medical History:   Diagnosis Date    Anxiety     Depression     Psychiatric illness     Self-injurious behavior     Suicide attempt ([place] [address] )      Allergies   Allergen Reactions    Ceclor [Cefaclor] Hives    Nuts - Food Allergy GI Intolerance    Latex Rash          Mental Status Evaluation:    Appearance:  age appropriate, adequate grooming   Behavior:  pleasant, cooperative   Speech:  normal rate and volume   Mood:  improved   Affect:  brighter   Thought Process:  goal directed   Associations: intact associations   Thought Content:  no overt delusions   Perceptual Disturbances: none   Risk Potential: Suicidal ideation - None  Homicidal ideation - None   Sensorium:  oriented to person, place and time/date   Memory:  recent and remote memory grossly intact   Consciousness:  alert and awake   Attention: attention span and concentration are age appropriate   Insight:  intact   Judgment: intact   Gait/Station: unable to assess   Motor Activity: unable to assess today due to virtual visit       Laboratory results: I have personally reviewed all pertinent laboratory/tests results  Assessment   MDD, severe, recurrent  NATASHA  PMDD    Current Outpatient Medications   Medication Sig Dispense Refill    albuterol (PROVENTIL HFA,VENTOLIN HFA) 90 mcg/act inhaler Inhale 2 puffs every 4 (four) hours as needed for wheezing (Patient not taking: Reported on 1/27/2022 ) 8 g 0    busPIRone (BUSPAR) 30 MG tablet Take 1 tablet (30 mg total) by mouth 2 (two) times a day 60 tablet 1    cyanocobalamin (VITAMIN B-12) 100 mcg tablet Take 1 tablet by mouth every 24 hours      escitalopram (LEXAPRO) 20 mg tablet Take 1 tablet (20 mg total) by mouth daily 30 tablet 1    QUEtiapine (SEROquel XR) 150 mg 24 hr tablet Take 1 tablet (150 mg total) by mouth daily at bedtime 30 tablet 0     No current facility-administered medications for this visit  Plan    Planned medication and treatment changes:  Stop lunesta secondary to cost   Increase seroquel to 150 mg and change to XR to further improve mood and facilitate sleep  Cont buspar 30 mg bid, lexapro 20 mg/d  All current active medications have been reviewed  Continue treatment with group therapy and partial program      Risks / Benefits of Treatment:    Risks, benefits, and possible side effects of medications explained to patient and patient verbalizes understanding and agrees to medications  Counseling / Coordination of Care:    Patient's progress discussed with staff in treatment team meeting  Medications, treatment progress and treatment plan reviewed with patient      Mary Aggarwal PA-C 02/09/22

## 2022-02-09 NOTE — PSYCH
Subjective:     Patient ID: Tiffanie Castillo is a 43 y o  female  [facility]ations Clinical Progress Notes      Specialized Services Documentation  Therapist must complete separate progress note for each specific clinical activity in which the individual participated during the day  Group Psychotherapy Life Skills ([id number]) Tiffanie Castillo actively engaged in group focused on ruptures and repairs in relationships which was facilitated virtually in a private office using HIPAA Compliant and Approved [employer] Teams  Robert Scott consented to the use of tele-video modality of treatment and was virtually present for group psychotherapy today  Group members watched a video, The secret of successful relationships: rupture and repair  We discussed about ruptures and repairs in relationships and how do we know the relationship is worth repairing  Group members shared about relationships they want(ed) to repair and relationships that were ruptured that weren't worth repairing  Robert Scott did not state about a relationship that they did or did not want to repair  During group we discussed why or why not improving the relationship is important  Group members were asked to think about how the other person might view the relationship  Clients discussed what strengths, values and good that person has brought into their life  The group discussed ways to repair relationshipsThe group briefly reviewed the 4 step process to forgiveness and the 5 step processes to apologizing    Clients discussed what they have learned from this reflection  Robert Scott continues to make progress towards goals through verbal participation in group; to accomplish long term goals continue to utilize skills learned in programming  Continue with psychotherapy to educate and encourage use of wellness tools   Tx Plan Objective: 1 1,1 2 Therapist: Syed Root

## 2022-02-09 NOTE — PSYCH
Virtual Regular Visit    Verification of patient location:    Patient is located in the following state in which I hold an active license [place]      Assessment/Plan:    Problem List Items Addressed This Visit        Other    Severe episode of recurrent major depressive disorder, without psychotic features ([facility] )    Generalized anxiety disorder - Primary    Insomnia          Goals addressed in session:           Reason for visit is PHP VIRTUAL GROUP DUE TO COVID-19      Encounter provider [facility] PARTIAL [address]    Provider located at [address] [place] [address]   [id number] [facility] [phone]      Recent Visits  Date Type Provider Dept   02/07/22 Office Visit [facility] PARTIAL PSYCH GROUP THERAPY [facility]   02/04/22 Office Visit [facility] PARTIAL PSYCH GROUP THERAPY [facility]   02/03/22 Office Visit [facility] PARTIAL PSYCH GROUP THERAPY [facility]   02/02/22 Office Visit [facility] PARTIAL PSYCH GROUP THERAPY [facility]   Showing recent visits within past 7 days and meeting all other requirements  Today's Visits  Date Type Provider Dept   02/09/22 Office Visit [facility] PARTIAL PSYCH GROUP THERAPY [facility]   Showing today's visits and meeting all other requirements  Future Appointments  No visits were found meeting these conditions  Showing future appointments within next 150 days and meeting all other requirements       The patient was identified by name and date of birth  Lisa Madrid was informed that this is a telemedicine visit and that the visit is being conducted throughMicrosoft Teams and patient was informed that this is a secure, HIPAA-compliant platform  She agrees to proceed     My office door was closed  No one else was in the room  She acknowledged consent and understanding of privacy and security of the video platform   The patient has agreed to participate and understands they can discontinue the visit at any time  Patient is aware this is a billable service  Subjective  Jackeyln Coulter is a 43 y o  female   HPI     Past Medical History:   Diagnosis Date    Anxiety     [facility]     Depression     Psychiatric illness     Self-injurious behavior     Suicide attempt ([facility] )        No past surgical history on file  Current Outpatient Medications   Medication Sig Dispense Refill    albuterol (PROVENTIL HFA,VENTOLIN HFA) 90 mcg/act inhaler Inhale 2 puffs every 4 (four) hours as needed for wheezing (Patient not taking: Reported on 1/27/2022 ) 8 g 0    busPIRone (BUSPAR) 30 MG tablet Take 1 tablet (30 mg total) by mouth 2 (two) times a day 60 tablet 1    cyanocobalamin (VITAMIN B-12) 100 mcg tablet Take 1 tablet by mouth every 24 hours      escitalopram (LEXAPRO) 20 mg tablet Take 1 tablet (20 mg total) by mouth daily 30 tablet 1    QUEtiapine (SEROquel XR) 150 mg 24 hr tablet Take 1 tablet (150 mg total) by mouth daily at bedtime 30 tablet 0     No current facility-administered medications for this visit  Allergies   Allergen Reactions    Ceclor [Cefaclor] Hives    Nuts - Food Allergy GI Intolerance    Latex Rash       Review of Systems    Video Exam    There were no vitals filed for this visit  Physical Exam     I spent FOUR GROUP HOURS PLUS CASE MANAGEMENT minutes with patient today in which greater than 50% of the time was spent in counseling/coordination of care regarding PHP - SEE NOTES  VIRTUAL VISIT DISCLAIMER    [facility] verbally agrees to participate in [employer]  Pt is aware that [employer] could be limited without vital signs or the ability to perform a full hands-on physical exam  Jackelyn Coulter understands she or the provider may request at any time to terminate the video visit and request the patient to seek care or treatment in person

## 2022-02-09 NOTE — PSYCH
Subjective:     Patient ID: Lisa Madrid is a 43 y o  female  [facility]ations Clinical Progress Notes      Specialized Services Documentation  Therapist must complete separate progress note for each specific clinical activity in which the individual participated during the day  Group Psychotherapy   This group was facilitated virtually in a private office using HIPAA Compliant and Approved [employer] Teams  Lisa Madrid consented to the use of tele-video modality of treatment  [id number] Lisa Madrid was present in psychoeducational group about anxiety and depression  The group followed a slide show presentation on anxiety and depression  Group was educated on:   Signs and symptoms of anxiety and depression as well as different types of depression   Diagnosis criteria    Causes and treatments of anxiety and depression    How the brain processes anxiety   Physical effects on short term and long-term depression   Group then reflected on what experience they have had with depression and what has helped and not helped r/t treatment and coping skills   Group members then discussed anxiety causing situations and positive ways to cope with these stressors  Coping skills discussed were challenging irrational thoughts, progressive muscle relaxation, deep breathing, and imagery  Good progress towards goal noted through participation in group  Continue psychoeducational groups on depression to teach the value of learning about diagnosis and treatments available  Tx Plan Objective:  1 1, 1 2, 1 3, 1 4 Therapist:  Darren Beasley BSN RN     Case Management Note    Joyce WHEELERN RN    Current suicide risk : Low     A case management session is not scheduled today with Lisa Madrid ; additionally, they did not request a CM meeting  Next scheduled session is Thursday 02/10/2022  Medications changes/added/denied?  No    Treatment session number: 10    Individual Case Management Visit provided today?  No    Innovations follow up physician's orders: None at this time

## 2022-02-10 ENCOUNTER — OFFICE VISIT (OUTPATIENT)
Dept: PSYCHOLOGY | Facility: CLINIC | Age: 37
End: 2022-02-10
Payer: COMMERCIAL

## 2022-02-10 DIAGNOSIS — G47.00 INSOMNIA, UNSPECIFIED TYPE: ICD-10-CM

## 2022-02-10 DIAGNOSIS — F41.1 GENERALIZED ANXIETY DISORDER: Primary | ICD-10-CM

## 2022-02-10 DIAGNOSIS — F33.2 SEVERE EPISODE OF RECURRENT MAJOR DEPRESSIVE DISORDER, WITHOUT PSYCHOTIC FEATURES (HCC): ICD-10-CM

## 2022-02-10 PROCEDURE — G0176 OPPS/PHP;ACTIVITY THERAPY: HCPCS

## 2022-02-10 PROCEDURE — G0410 GRP PSYCH PARTIAL HOSP 45-50: HCPCS

## 2022-02-10 NOTE — PSYCH
Subjective:     Patient ID: Anna Bee is a 43 y o  female  In[facility]ations Clinical Progress Notes      Specialized Services Documentation  Therapist must complete separate progress note for each specific clinical activity in which the individual participated during the day  GROUP PSYCHOTHERAPY  ([id number])  Jackelyn attentively listened to [facility] share his life story as he co-led this session  Group encouraged power of learning about self, accepting illness and personal responsibility in recovery  Community resources reviewed in addition to personal resources like the affirmations  Progress toward goals noted  Continue psychotherapy to encourage self -awareness and healthy engagement of supports      TX Plan Objectives: 1 1, 1 2, 1 4   Therapist: LIAM Rios

## 2022-02-10 NOTE — PSYCH
Virtual Regular Visit    Verification of patient location:    Patient is located in the following state in which I hold an active license [place]      Assessment/Plan:    Problem List Items Addressed This Visit        Other    Severe episode of recurrent major depressive disorder, without psychotic features ([facility] [address] )    Generalized anxiety disorder - Primary    Insomnia          Goals addressed in session:           Reason for visit is PHP VIRTUAL GROUP DUE TO COVID-19      Encounter provider [facility] PARTIAL [address]    Provider located at [address] [place] [address]  [address] [facility] [id number]      Recent Visits  Date Type Provider Dept   02/09/22 Office Visit [facility] PARTIAL PSYCH GROUP THERAPY [facility]   02/07/22 Office Visit [facility] PARTIAL PSYCH GROUP THERAPY [facility]   02/04/22 Office Visit [facility] PARTIAL PSYCH GROUP THERAPY [facility]   02/03/22 Office Visit [facility] PARTIAL PSYCH GROUP THERAPY [facility]   Showing recent visits within past 7 days and meeting all other requirements  Today's Visits  Date Type Provider Dept   02/10/22 Office Visit [facility] PARTIAL PSYCH GROUP THERAPY [facility]   Showing today's visits and meeting all other requirements  Future Appointments  No visits were found meeting these conditions  Showing future appointments within next 150 days and meeting all other requirements       The patient was identified by name and date of birth  Marya Vanegas was informed that this is a telemedicine visit and that the visit is being conducted throughMicrosoft Teams and patient was informed that this is a secure, HIPAA-compliant platform  She agrees to proceed     My office door was closed  No one else was in the room  She acknowledged consent and understanding of privacy and security of the video platform   The patient has agreed to participate and understands they can discontinue the visit at any time  Patient is aware this is a billable service  Subjective  Jackelyn Moon is a 43 y o  female   HPI     Past Medical History:   Diagnosis Date    Anxiety     Borderline personality disorder ([facility] [address] )     Depression     Psychiatric illness     Self-injurious behavior     Suicide attempt ([facility] )        No past surgical history on file  Current Outpatient Medications   Medication Sig Dispense Refill    albuterol (PROVENTIL HFA,VENTOLIN HFA) 90 mcg/act inhaler Inhale 2 puffs every 4 (four) hours as needed for wheezing (Patient not taking: Reported on 1/27/2022 ) 8 g 0    busPIRone (BUSPAR) 30 MG tablet Take 1 tablet (30 mg total) by mouth 2 (two) times a day 60 tablet 1    cyanocobalamin (VITAMIN B-12) 100 mcg tablet Take 1 tablet by mouth every 24 hours      escitalopram (LEXAPRO) 20 mg tablet Take 1 tablet (20 mg total) by mouth daily 30 tablet 1    QUEtiapine (SEROquel XR) 150 mg 24 hr tablet Take 1 tablet (150 mg total) by mouth daily at bedtime 30 tablet 0     No current facility-administered medications for this visit  Allergies   Allergen Reactions    Ceclor [Cefaclor] Hives    Nuts - Food Allergy GI Intolerance    Latex Rash       Review of Systems    Video Exam    There were no vitals filed for this visit  Physical Exam     I spent FOUR GROUP HOURS PLUS CASE MANAGEMENT minutes with patient today in which greater than 50% of the time was spent in counseling/coordination of care regarding PHP - SEE NOTES  VIRTUAL VISIT DISCLAIMER    Anna Bee verbally agrees to participate in [employer]  Pt is aware that [employer] could be limited without vital signs or the ability to perform a full hands-on physical exam  Jackelyn Moon understands she or the provider may request at any time to terminate the video visit and request the patient to seek care or treatment in person

## 2022-02-10 NOTE — PSYCH
Subjective:     Patient ID: Linn Frazier is a 43 y o  female  [facility] Clinical Progress Notes      Specialized Services Documentation  Therapist must complete separate progress note for each specific clinical activity in which the individual participated during the day  Allied Therapy   This group was facilitated virtually in a private office using HIPAA Compliant and Approved [employer] Teams  Linn Frazier consented to the use of tele-video modality of treatment  [id number] Linn Frazier  actively shared in [facility] group focused on distress tolerance skill self-soothe  Kallie Goodson was observed to be engaged in therapist led visualization  Group discussed specific items that could help self soothe if in an anxiety crisis with encouragement to use these things regularly to manage stressors consistently  She identified she would put tea  in  a self soothe kit  Some effort noted toward tx goal   Continue AT to encourage development of wellness skills and consistent practice        Tx Plan Objective: 1 1,1 2, Therapist:  Dorothy BERGMAN

## 2022-02-10 NOTE — PSYCH
Subjective:     Patient ID: Gigi Liu is a 43 y o  female  [facility] Clinical Progress Notes      Specialized Services Documentation  Therapist must complete separate progress note for each specific clinical activity in which the individual participated during the day  Group Psychotherapy  This group was facilitated virtually in a private office using HIPAA Compliant and Approved [employer] Teams  Gigi Liu consented to the use of tele-video modality of treatment  ([id number]) Gigi Liu attended group on the Wellness Recovery Action Plan  Group members were educated on the background of the WRAP  Members were informed WRAP was emailed to them this AM   Writer explained the benefit of utilizing the WRAP prior to members initiating it  Members then focused on the WRAP: the wellness toolbox, daily plan, identification of triggers and stressors  Next the group began developing action plans to respond to stressors and crisis situations  Group members shared pieces of information from these sections and identified their importance  Writer encouraged the members of group to continue utilizing the packet to develop plans inside and outside of program  Good progress displayed through participation and engagement in topic  Treatment Plan Objectives: 1 1, 1 2  Therapist: Ede WHEELERN RN      Education Therapy   [id number] Gigi Liu actively shared in morning assessment and goal review  Presented as Receptive related to readiness to learn  Gigi Liu did complete goal from last treatment day identifying gaining resposibility  did not present with any barriers to learning  [address] engaged throughout the treatment day  Was engaged in learning related to Illness, Medication, Aftercare, and Wellness Tools  Staff utilized Verbal, Written, A/V, and Demonstration teaching methods    Gigi Liu shared area of learning and set a goal for outside of program to pick out her cloths for the week to make it easier on her in the mornings  Tx Plan Objective: 1 1,1 2,1 4 Therapist:  Darren SHORE RN      Case Management Note    Darren SHORE RN    Current suicide risk : Low     A case management session is not scheduled today with Lisa Madrid ; additionally, they did not request a CM meeting  Next scheduled session is Friday 02/11/2022 at 1145       Medications changes/added/denied? No    Treatment session number: 11    Individual Case Management Visit provided today?  No    [facility]ations follow up physician's orders: None at this time

## 2022-02-11 ENCOUNTER — OFFICE VISIT (OUTPATIENT)
Dept: PSYCHOLOGY | Facility: CLINIC | Age: 37
End: 2022-02-11
Payer: COMMERCIAL

## 2022-02-11 DIAGNOSIS — F33.2 SEVERE EPISODE OF RECURRENT MAJOR DEPRESSIVE DISORDER, WITHOUT PSYCHOTIC FEATURES (HCC): ICD-10-CM

## 2022-02-11 DIAGNOSIS — G47.00 INSOMNIA, UNSPECIFIED TYPE: ICD-10-CM

## 2022-02-11 DIAGNOSIS — F41.1 GENERALIZED ANXIETY DISORDER: Primary | ICD-10-CM

## 2022-02-11 PROCEDURE — G0176 OPPS/PHP;ACTIVITY THERAPY: HCPCS

## 2022-02-11 PROCEDURE — G0410 GRP PSYCH PARTIAL HOSP 45-50: HCPCS

## 2022-02-11 PROCEDURE — G0177 OPPS/PHP; TRAIN & EDUC SERV: HCPCS

## 2022-02-11 NOTE — PSYCH
Subjective:     Patient ID: Freddie Velez is a 43 y o  female  In[facility] Clinical Progress Notes      Specialized Services Documentation  Therapist must complete separate progress note for each specific clinical activity in which the individual participated during the day  GROUP PSYCHOTHERAPY ([id number]) Group was facilitated virtually in a private office using HIPAA Compliant and Approved Microsoft Teams  Freddie Velez consented to the use of tele-video modality of treatment and was virtually present for group psychotherapy today  The group engaged in the wellness assessment, which evaluates progress on several different areas of wellness/wellbeing: physical, emotional, cognitive, vocational, social and spiritual  Clients rated their progress and discussed areas that need work  By completing and discussing areas of progress and challenges, members are connected and reminded that, in their mental health struggle, they are not alone  Topics of discussion revolved around becoming aware and making changes and discussions on broadening views of vocational and spiritual wellness  Freddie Velez shared an area of growth was prioritizing needs and accomplishing goals accordingly  See [place] continues to demonstrate progress towards goals through participation in group activity and personal disclosures  Continue with psychotherapy     TX Plan Objectives: 1 1, 1 2, 1 4  Therapist: Marianna Vivar MA, LPC

## 2022-02-11 NOTE — PSYCH
Virtual Regular Visit    Verification of patient location:    Patient is located in the following state in which I hold an active license [place]      Assessment/Plan:    Problem List Items Addressed This Visit        Other    Severe episode of recurrent major depressive disorder, without psychotic features ([address] )    Generalized anxiety disorder - Primary    Insomnia          Goals addressed in session:           Reason for visit is PHP VIRTUAL GROUP DUE TO COVID-19      Encounter provider [address] PARTIAL [address]    Provider located at [address] [place] [address]   [address] [facility] [id number]      Recent Visits  Date Type Provider Dept   02/10/22 Office Visit [address] PARTIAL PSYCH GROUP THERAPY [facility]   02/09/22 Office Visit [address] PARTIAL PSYCH GROUP THERAPY [facility]   02/07/22 Office Visit [address] PARTIAL PSYCH GROUP THERAPY [facility]   02/04/22 Office Visit [address] PARTIAL PSYCH GROUP THERAPY [facility]   Showing recent visits within past 7 days and meeting all other requirements  Today's Visits  Date Type Provider Dept   02/11/22 Office Visit [address] PARTIAL PSYCH GROUP THERAPY [facility]   Showing today's visits and meeting all other requirements  Future Appointments  No visits were found meeting these conditions  Showing future appointments within next 150 days and meeting all other requirements       The patient was identified by name and date of birth  Linn Frazier was informed that this is a telemedicine visit and that the visit is being conducted throughMicrosoft Teams and patient was informed that this is a secure, HIPAA-compliant platform  She agrees to proceed     My office door was closed  No one else was in the room  She acknowledged consent and understanding of privacy and security of the video platform   The patient has agreed to participate and understands they can discontinue the visit at any time  Patient is aware this is a billable service  Subjective  Jackelyn Proctor is a 43 y o  female   HPI     Past Medical History:   Diagnosis Date    Anxiety     Borderline [facility] disorder ([facility] [address] )     Depression     Psychiatric illness     Self-injurious behavior     Suicide attempt ([facility] )        No past surgical history on file  Current Outpatient Medications   Medication Sig Dispense Refill    albuterol (PROVENTIL HFA,VENTOLIN HFA) 90 mcg/act inhaler Inhale 2 puffs every 4 (four) hours as needed for wheezing (Patient not taking: Reported on 1/27/2022 ) 8 g 0    busPIRone (BUSPAR) 30 MG tablet Take 1 tablet (30 mg total) by mouth 2 (two) times a day 60 tablet 1    cyanocobalamin (VITAMIN B-12) 100 mcg tablet Take 1 tablet by mouth every 24 hours      escitalopram (LEXAPRO) 20 mg tablet Take 1 tablet (20 mg total) by mouth daily 30 tablet 1    QUEtiapine (SEROquel XR) 150 mg 24 hr tablet Take 1 tablet (150 mg total) by mouth daily at bedtime 30 tablet 0     No current facility-administered medications for this visit  Allergies   Allergen Reactions    Ceclor [Cefaclor] Hives    Nuts - Food Allergy GI Intolerance    Latex Rash       Review of Systems    Video Exam    There were no vitals filed for this visit  Physical Exam     I spent FOUR GROUP HOURS PLUS CASE MANAGEMENT minutes with patient today in which greater than 50% of the time was spent in counseling/coordination of care regarding PHP - SEE NOTES  VIRTUAL VISIT DISCLAIMER    Darrell Lopez verbally agrees to participate in [employer]  Pt is aware that [employer] could be limited without vital signs or the ability to perform a full hands-on physical exam  Jackelyn Proctor understands she or the provider may request at any time to terminate the video visit and request the patient to seek care or treatment in person

## 2022-02-11 NOTE — PSYCH
Assessment/Plan:      Diagnoses and all orders for this visit:    Generalized anxiety disorder    Severe episode of recurrent major depressive disorder, without psychotic features ([facility] [address] )    Insomnia, unspecified type          Subjective:     Patient ID: Venancio Adam is a 43 y o  female  [facility] Treatment Plan   AREAS OF NEED: Anxiety, depression, and insomnia as evidenced by excessive worrying, panic attacks, heaviness on her chest, headaches, hyperventilating, racing heart, shakiness, difficulty sleeping at nights, over eating unhealthy foods, little happiness from doing scrapbooking, and feeling hopeless  due to chronic mental health issues, job stressors, and family stressors  Date Initiated: 01/21/22     Strengths: "organized, good at scrap booking, creative"      LONG TERM GOAL:   Date Initiated: 01/21/22  1 0  I will demonstrate and share two improvements in my emotional regulation skills during attendance to program    Target Date: 02/18/2022  Completion Date:         SHORT TERM OBJECTIVES:      Date Initiated: 01/21/22  1 1 I will recognize when I'm feeling overwhelmed and use identified coping skills to help decrease anxiety  Revision Date: 02/02/2022 continue, 02/11/22 continue  Target Date: 02/02/2022  Completion Date:      Date Initiated: 01/21/22  1 2 I will schedule daily calming/relaxation skill (e g , applied relaxation, progressive muscle relaxation, cue controlled relaxation; mindful breathing; biofeedback) into my routine to manage my anxiety symptoms  Revision Date: 02/02/2022 continue, 02/11/22 continue  Target Date: 02/02/2022  Completion Date:     Date Initiated: 01/21/22  1 3 I will take medications as prescribed and share questions and concerns if arise      Revision Date: 02/02/2022 continue, 02/11/22 continue  Target Date: 02/02/2022  Completion Date:      Date Initiated: 01/21/22  1 4 I will identify 3 ways my supports can assist in my recovery and agree to staff/support contact as indicated  Revision Date: 02/02/2022 continue, 02/11/22 continue  Target Date: 02/02/2022  Completion Date:            7 DAY REVISION:     Date Initiated: 02/02/2022  1 5 I will spend 5-10 minutes daily to work on [employer] to support my recovery  Revision Date: 02/11/22  continue  Target Date: 02/14/2022  Completion Date:    Date Initiated: 02/11/22   1 6 I will work on obtaining outpatient therapist through obtaining provider list and making contacts  Revision Date:   Target Date: 02/23/2022  Completion Date:        PSYCHIATRY:  Date Initiated:  01/21/22  Medication Management and Education       Revision Date: 02/02/2022 02/11/22       1 3 Continue medication management        The person(s) responsible for carrying out the plan is Mylene Lei MD; Negin Bryant PA-C     NURSING/SYMPTOM EDUCATION:   Date Initiated: 01/21/22  1 1, 1 2  1 3, 1 4 This RN/Or Therapist will provide wellness/symptoms and skill education groups three to five days weekly to educate Linn Frazier on signs and symptoms of diagnoses, skills to manage, and medication questions that will be addressed by the treatment team     Revision date: 02/02/2022,  02/11/22  1 1,1 2,1 3,1 4,1 5, 1 6 Continue to encourage Linn Frazier to participate in wellness/symptoms and skills education groups daily to learn about symptoms, coping strategies and warning signs to promote relapse prevention  The person(s) responsible for carrying out the plan is Jasvir Martin RN, BSN     PSYCHOLOGY:   Date Initiated: 01/21/22       1 1, 1 2, 1 4 Provide psychotherapy group 5 times per week to allow opportunity for Linn Frazier  to explore stressors and ways of coping  Revision Date: 02/02/2022,  02/11/22   1 1,1 2,1 4,1 5, 1 6  Continue to provide psychotherapy group daily to Linn Frazier and encourage sharing of stressors, skills and positive change      The person(s) responsible for carrying out the plan is Marguerite Dubose Brianna Menchaca, MSW,LSW     ALLIED THERAPY:   Date Initiated: 01/21/22  1 1,1 2 Engage Michaela JOHNSON in AT group 5 times weekly to encourage development and use of wellness tools to decrease symptoms and promote recovery through meaningful activity  Revision Date: 02/02/2022, 02/11/22   1 1,1 2,1 5, 1 6 Continue to engage Zoë Jacob to participate in AT group to practice wellness tools within program and transfer to home sharing successes and barriers through healthy task involvement  The person(s) responsible for carrying out the plan is PACHECO Garcia      CASE MANAGEMENT:   Date Initiated: 01/21/22      1 0 This  will meet with Zoë Jacob  3-4 times weekly to assess treatment progress, discharge planning, connection to community supports and UR as indicated  Revision Date: 02/02/2022, 02/11/22   1 0 Continue to meet with Zoë Jacob 3-4 times weekly to assess growth, work toward goals, continued treatment needs, dc planning and use of supports  The person(s) responsible for carrying out the plan is Giselle SHORE RN     TREATMENT REVIEW/COMMENTS:      DISCHARGE CRITERIA: Identify 3 signs of progress and complete relapse prevention plan  DISCHARGE PLAN: Connect with identified outpatient providers     Estimated Length of Stay: 10 treatment days             Diagnosis and Treatment Plan explained to Benna Lefort relates understanding diagnosis and is agreeable to Treatment Plan             CLIENT COMMENTS / Please share your thoughts, feelings, need and/or experiences regarding your treatment plan: _____________________________________________________________________________________________________________________________________________________________________________________________________________________________________________________________________________________________________________________ Date/Time: ______________      Patient Signature: _________________________________      Date/Time: ______________       Signature: _________________________________      Date/Time: ______________       Diagnosis and Treatment Plan explained to Silva Divine relates understanding diagnosis and is agreeable to Treatment Plan           CLIENT COMMENTS / Please share your thoughts, feelings, need and/or experiences regarding your treatment plan: _____________________________________________________________________________________________________________________________________________________________________________________________________________________________________________________________________________________________________________________ Date/Time: ______________     Patient Signature: _________________________________     Date/Time: ______________      Signature: _________________________________     Date/Time: ______________

## 2022-02-11 NOTE — PSYCH
Subjective:     Patient ID: Venancio Adam is a 43 y o  female  [facility] Clinical Progress Notes      Specialized Services Documentation  Therapist must complete separate progress note for each specific clinical activity in which the individual participated during the day  GROUP PSYCHOTHERAPY ([id number]) Group was facilitated virtually in a private office using HIPAA Compliant and Approved Microsoft Teams  Attila Nance consented to the use of tele-video modality of treatment and was virtually present for group psychotherapy today  The group received education on different grounding techniques  The Grounding Techniques worksheet describes four skills for controlling intense emotional experiences and regaining mental focus  Members then engaged in watching two interactive tools that focused on using grounding skills  The group was then asked to answer the following question:  1  Which grounding technique are you most willing to try? Why?  2  How did you feel while engaging in the interactive guides? 3  What were some of the challenges you faced while engaging in the interactive tools? Attila Nance seemed very engaged throughout the group session  Attila Putt stated that the grounding technique they are most willing to try is the senses exercise  Attila Nance is encouraged to make progress towards goals and objectives through group participation and will continue to attend psychotherapy group  Tx plan objective: 1 1, 1 2   Therapist: Ailin Hanna MA    Education Therapy   [id number] Balaji JOHNSON actively shared in morning assessment and goal review  Presented as Receptive related to readiness to learn  Venancio Adam did complete goal from last treatment day identifying gaining advocacy  did not present with any barriers to learning  [address] engaged throughout the treatment day  Was engaged in learning related to Illness, Aftercare and Wellness Tools   Staff utilized Verbal and A/V teaching methods  Lauro Dakin shared area of learning and set a goal for outside of program to type up her group notes        Tx Plan Objective: 1 4, Therapist:  Duane Sheffield MA

## 2022-02-11 NOTE — PSYCH
Subjective:     Patient ID: Suzie Damico is a 43 y o  female  [facility] Clinical Progress Notes      Specialized Services Documentation  Therapist must complete separate progress note for each specific clinical activity in which the individual participated during the day  Group Psychotherapy Life Skills  ([id number])  [facility] in group focused on developing self-compassion which was facilitated virtually in a private office using HIPAA Compliant and Approved [employer] Teams  Freedom Chuyita consented to the use of tele-video modality of treatment and was virtually present for group psychotherapy today  Group members discussed what is self compassion  During the group we discussed the benefits of self-compassion and how it helps improve well-being, connection to others, increases selflessness, regulates emotions, reduces depression and anxiety  Group members practiced self-compassion by completing self-compassion exercises drawn from "The Self-Compassion Deck"  Freedom Boogie discussed how they could bring self-compassion into their lives by practicing self care  Freedom Boogie continues to make progress towards goals through verbal participation in group; to accomplish long term goals continue to utilize skills learned in programming  Continue with psychotherapy to educate and encourage use of wellness tools   Tx Plan Objective: 1 1,1 2 Therapist: Maye Dukes

## 2022-02-11 NOTE — PSYCH
Subjective:     Patient ID: Venancio Adam is a 43 y o  female  [facility] Clinical Progress Notes      Specialized Services Documentation  Therapist must complete separate progress note for each specific clinical activity in which the individual participated during the day  Case Management Note  This case management session was facilitated virtually in a private office using HIPAA Compliant and Approved Microsoft Teams  Venancio Adam consented to the use of tele-video modality of treatment  Jelena Rios, RN, BSN    Current suicide risk : Low     [id number] Met with Venancio Adam  Reported she is having more good days vs bad days  Identifies she has been able to use coping skills and allow herself time for self care  She has begun a routine to better manage stress  Barriers  Reviewed updated treatment plan and agreeable  Attila Nance will need a list of outpatient therapist who use DBT  Will check for therapists in network of her insurance  Attila Nance is dressed in dress cloths today, has her hair done, has an upbeat tone to her voice, and is smiling  Reports her  will be her support this weekend  Aware of med check date and time for next week  Medications changes/added/denied? No    Treatment session number: 12    Individual Case Management Visit provided today?  Yes     [facility] follow up physician's orders: No new orders

## 2022-02-14 ENCOUNTER — OFFICE VISIT (OUTPATIENT)
Dept: PSYCHOLOGY | Facility: CLINIC | Age: 37
End: 2022-02-14
Payer: COMMERCIAL

## 2022-02-14 DIAGNOSIS — F41.1 GENERALIZED ANXIETY DISORDER: ICD-10-CM

## 2022-02-14 DIAGNOSIS — F33.2 SEVERE EPISODE OF RECURRENT MAJOR DEPRESSIVE DISORDER, WITHOUT PSYCHOTIC FEATURES (HCC): Primary | ICD-10-CM

## 2022-02-14 PROCEDURE — G0410 GRP PSYCH PARTIAL HOSP 45-50: HCPCS

## 2022-02-14 PROCEDURE — G0176 OPPS/PHP;ACTIVITY THERAPY: HCPCS

## 2022-02-14 NOTE — PSYCH
Subjective:     Patient ID: Wilfredo Shea is a 43 y o  female  [facility] Clinical Progress Notes      Specialized Services Documentation  Therapist must complete separate progress note for each specific clinical activity in which the individual participated during the day  Allied Therapy   This group was facilitated virtually in a private office using HIPAA Compliant and Approved [employer] Teams  Wilfredo Shea consented to the use of tele-video modality of treatment  [id number] Marcela JOHNSON  actively shared in [facility] group focused on DBT skill wise mind  ORLÉANS engaged in tasks exploring differences between reasonable and emotion mind and ways to get to wise mind  Group explored the benefits of mindfulness and practiced ways to slow down to begin to develop wise mind  Group reinforced role of participating with wise mind in order to prevent getting stuck in the past or fears of the future  Some effort toward treatment goal noted  Continue AT to encourage healthy skill development and practice of explored strategies         Tx Plan Objective: 1 1, Therapist:  Talib Guajardo MT-BC

## 2022-02-14 NOTE — PSYCH
Virtual Regular Visit    Verification of patient location:    Patient is located in the following state in which I hold an active license [place]      Assessment/Plan:    Problem List Items Addressed This Visit     None          Goals addressed in session: Goal 1          Reason for visit is No chief complaint on file  Encounter provider [address] PARTIAL PSYCH PROGRAM    Provider located at [address] [place] [address]   [address] [facility] [id number]      Recent Visits  Date Type Provider Dept   02/11/22 Office Visit [address] PARTIAL PSYCH GROUP THERAPY [facility]   02/10/22 Office Visit [address] PARTIAL PSYCH GROUP THERAPY [facility]   02/09/22 Office Visit [address] PARTIAL PSYCH GROUP THERAPY [facility]   02/07/22 Office Visit [address] PARTIAL PSYCH GROUP THERAPY [facility]   Showing recent visits within past 7 days and meeting all other requirements  Today's Visits  Date Type Provider Dept   02/14/22 Office Visit [address] PARTIAL PSYCH GROUP THERAPY [facility]   Showing today's visits and meeting all other requirements  Future Appointments  No visits were found meeting these conditions  Showing future appointments within next 150 days and meeting all other requirements       The patient was identified by name and date of birth  Lia Shipley was informed that this is a telemedicine visit and that the visit is being conducted throughMicrosoft Teams and patient was informed that this is a secure, HIPAA-compliant platform  She agrees to proceed     My office door was closed  No one else was in the room  She acknowledged consent and understanding of privacy and security of the video platform  The patient has agreed to participate and understands they can discontinue the visit at any time  Patient is aware this is a billable service  Mildred Roa is a 43 y o  female         HPI     Past Medical History:   Diagnosis Date    Anxiety     Borderline [facility] disorder ([facility] [address] )     Depression     Psychiatric illness     Self-injurious behavior     Suicide attempt ([facility] )        No past surgical history on file  Current Outpatient Medications   Medication Sig Dispense Refill    albuterol (PROVENTIL HFA,VENTOLIN HFA) 90 mcg/act inhaler Inhale 2 puffs every 4 (four) hours as needed for wheezing (Patient not taking: Reported on 1/27/2022 ) 8 g 0    busPIRone (BUSPAR) 30 MG tablet Take 1 tablet (30 mg total) by mouth 2 (two) times a day 60 tablet 1    cyanocobalamin (VITAMIN B-12) 100 mcg tablet Take 1 tablet by mouth every 24 hours      escitalopram (LEXAPRO) 20 mg tablet Take 1 tablet (20 mg total) by mouth daily 30 tablet 1    QUEtiapine (SEROquel XR) 150 mg 24 hr tablet Take 1 tablet (150 mg total) by mouth daily at bedtime 30 tablet 0     No current facility-administered medications for this visit  Allergies   Allergen Reactions    Ceclor [Cefaclor] Hives    Nuts - Food Allergy GI Intolerance    Latex Rash       Review of Systems    Video Exam    There were no vitals filed for this visit  Physical Exam       I spent FOUR GROUP HOURS PLUS CASE MANAGEMENT minutes with patient today in which greater than 50% of the time was spent in counseling/coordination of care regarding PHP - SEE NOTES  VIRTUAL VISIT DISCLAIMER    Jeremy Fabian verbally agrees to participate in [employer]  Pt is aware that [employer] could be limited without vital signs or the ability to perform a full hands-on physical exam  Jackelyn Harding understands she or the provider may request at any time to terminate the video visit and request the patient to seek care or treatment in person

## 2022-02-14 NOTE — PSYCH
Subjective:     Patient ID: Yanely Romero is a 43 y o  female  [facility] Clinical Progress Notes      Specialized Services Documentation  Therapist must complete separate progress note for each specific clinical activity in which the individual participated during the day  GROUP PSYCHOTHERAPY ([id number]) Group was facilitated virtually in a private office using HIPAA Compliant and Approved Microsoft Teams  Chaparritacaiomonique Farrah consented to the use of tele-video modality of treatment and was virtually present for group psychotherapy today  The group engaged in education about codependency and what it entails  Members learned the difference between the enabler and the dependent in a codependent relationship  The group then answered the following questions:  1  Would you say you have more dependent tendencies or enabling tendencies? Why?  2  What have you learned about codependency? Erica Musemaci seemed very engaged throughout the group session  Erica Farrah stated that they have more dependent tendencies  Roseannamonique Yan is encouraged to make progress towards goals and objectives through group participation and will continue to attend psychotherapy group  Tx plan objective: 1 1, 1 2   Therapist: Sue Potter MA    Education Therapy   [id number] Yanely Romero was not present during morning assessment  [address] engaged throughout the treatment day  Was engaged in learning related to Illness, Aftercare and Wellness Tools  Staff utilized Verbal and A/V teaching methods  Yanely Romero shared area of learning and set a goal for outside of program to make a Lindquist's day card        Tx Plan Objective: 1 4, Therapist:  Sue Potter MA

## 2022-02-14 NOTE — PSYCH
Subjective:     Patient ID: Marya Vanegas is a 43 y o  female  [facility] Clinical Progress Notes      Specialized Services Documentation  Therapist must complete separate progress note for each specific clinical activity in which the individual participated during the day  Group Psychotherapy Life Skills ([id number]) Marya Vanegas actively engaged in group focused on strengths mapping which was facilitated virtually in a private office using HIPAA Compliant and Approved [employer] Teams  Marya Vanegas consented to the use of tele-video modality of treatment and was virtually present for group psychotherapy today  Clients were instructed to think of a positive experience that they had because they made it positive  Clients shared their experience and identified the things that they did to make it successful  Mally JOHNSON 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 and identified the six strengths that they used to make it successful   The group discussed why knowing their strengths was important  Group members discussed how they could apply their strengths to a current stressor  Marya Vanegas continues to make progress towards goals through verbal participation in group; to accomplish long term goals continue to utilize skills learned in programming  Continue with psychotherapy to educate and encourage use of wellness tools  Tx Plan Objective: 1 1,1 2 Therapist: Sheldon Miles    Case Management Note    NAVDEEP Parmar    Current suicide risk : Low     This is not a case management day  Marya Vanegas was informed if they needed a  to speak to please reach out  Marya Vanegas did not reach out for case management today  Medications changes/added/denied? No    Treatment session number: 13    Individual Case Management Visit provided today?  No    [facility]ations follow up physician's orders: none at this time

## 2022-02-15 ENCOUNTER — OFFICE VISIT (OUTPATIENT)
Dept: PSYCHOLOGY | Facility: CLINIC | Age: 37
End: 2022-02-15
Payer: COMMERCIAL

## 2022-02-15 DIAGNOSIS — G47.00 INSOMNIA, UNSPECIFIED TYPE: ICD-10-CM

## 2022-02-15 DIAGNOSIS — F41.1 GENERALIZED ANXIETY DISORDER: Primary | ICD-10-CM

## 2022-02-15 DIAGNOSIS — F33.2 SEVERE EPISODE OF RECURRENT MAJOR DEPRESSIVE DISORDER, WITHOUT PSYCHOTIC FEATURES (HCC): ICD-10-CM

## 2022-02-15 PROCEDURE — G0176 OPPS/PHP;ACTIVITY THERAPY: HCPCS

## 2022-02-15 PROCEDURE — G0410 GRP PSYCH PARTIAL HOSP 45-50: HCPCS

## 2022-02-15 NOTE — PSYCH
Subjective:     Patient ID: Symone Hamlin is a 43 y o  female  [facility] Clinical Progress Notes      Specialized Services Documentation  Therapist must complete separate progress note for each specific clinical activity in which the individual participated during the day  GROUP PSYCHOTHERAPY ([id number]) Group was facilitated virtually in a private office using HIPAA Compliant and Approved Microsoft Teams  Cheryletucker Landers consented to the use of tele-video modality of treatment and was virtually present for group psychotherapy today  The group engaged in discussion about life after discharge for them  We discussed the challenges and benefits about being discharged  Members were given the opportunity to process their emotions regarding no longer being in the partial program  Group members were then asked the following questions:  1  What are two ways you can start planning towards life after the partial program?   2  What is scary about discharge? 3  What is something you can look forward to? Cheryletucker Landers seemed very engaged throughout the group session and provided feedback to peers  Cheryletucker Landers stated that something they fear about discharge is feeling as though she did not learn enough in the program  Cheryletucker Landers is encouraged to make progress towards goals and objectives through group participation and will continue to attend psychotherapy group        Tx plan objective: 1 1, 1 2   Therapist: Maggie Willard MA

## 2022-02-15 NOTE — PSYCH
Subjective:     Patient ID: Mina Funk is a 43 y o  female  [facility] Clinical Progress Notes      Specialized Services Documentation  Therapist must complete separate progress note for each specific clinical activity in which the individual participated during the day  Group Psychotherapy Life Skills ([id number]) Mina Funk actively engaged in group focused on the labels which others put on us and the labels we put on ourselves which was facilitated virtually in a private office using HIPAA Compliant and Approved [employer] Teams  Concha Carver consented to the use of tele-video modality of treatment and was virtually present for group psychotherapy today  Group members watched a FELICIANO talk, The Power of Labels to Change Your Story  During the group members did an activity about the labels that people put on us and that we put on ourselves  Group members discussed the impact these labels have on their lives  The group explored which labels they want to get rid of and which they would like to believe more  Concha Carver stated the negative label they would like to give up and a positive label they would like to believe  Mina Funk would like to believe the label of being reliable  We discussed how to get rid of the negative labels and how their lives would change if they stopped believing them  Mina Funk continues to make progress towards goals through verbal participation in group; to accomplish long term goals continue to utilize skills learned in programming  Continue with psychotherapy to educate and encourage use of wellness tools   Tx Plan Objective: 1 1,1 2 Therapist: Alicia Hollins

## 2022-02-15 NOTE — PSYCH
Subjective:     Patient ID: Edgar Ramirez is a 43 y o  female  [facility] Clinical Progress Notes      Specialized Services Documentation  Therapist must complete separate progress note for each specific clinical activity in which the individual participated during the day  Group Psychotherapy  This group was facilitated virtually in a private office using HIPAA Compliant and Approved [employer] Teams  Edgar Ramirez consented to the use of tele-video modality of treatment  ([id number]) Edgar Ramirez actively engaged in psychoeducational group about medication management, types of antidepressants/side effects, and medication tips  Group came up with strategies that helped them remember to take their medications and developed ideas to make it easier in the future  The group talked about understanding the purpose, dose, type, timing and side effects of their medications  Teaching on emergency situations and who to go to for help was brought up as well  Group was encouraged to ask questions in an open forum at the end of group  Good progress displayed through engagement in topic  Treatment Plan Objective 1 1, 1 2, 1 3, 1 4 Therapist: Jimmy SHORE RN      Education Therapy   [id number] Edgar Ramirez actively shared in morning assessment and goal review  Presented as Receptive related to readiness to learn  Edgar Ramirez did complete goal from last treatment day identifying gaining hope  did not present with any barriers to learning  [address] engaged throughout the treatment day  Was engaged in learning related to Illness, Medication, Aftercare, and Wellness Tools  Staff utilized Verbal, Written, A/V, and Demonstration teaching methods  Edgar Ramirez shared area of learning and set a goal for outside of program to type notes from todays group        Tx Plan Objective: 1 1,1 2,1 4 Therapist:  Jimmy SHORE RN      Case Management Note  This case management session was facilitated virtually in a private office using HIPAA Compliant and Approved Microsoft Teams  Symone Hamlin consented to the use of tele-video modality of treatment  Kody Tovar RN, BSN    Current suicide risk : Low     [id number]  Met with Symone Hamlin  Reported she has felt good for more days than not  Progress noted in that ORSHANAE is identifying her strengths and use of coping skills such at [employer]  Reviewed option for DBT therapy outpatient and ORANS will make contact with [facility]  Medications changes/added/denied? No    Treatment session number: 14    Individual Case Management Visit provided today?  Yes     [facility] follow up physician's orders: No new orders

## 2022-02-15 NOTE — PSYCH
Virtual Regular Visit    Verification of patient location:    Patient is located in the following state in which I hold an active license [place]      Assessment/Plan:    Problem List Items Addressed This Visit        Other    Severe episode of recurrent major depressive disorder, without psychotic features ([address] )    Generalized anxiety disorder - Primary    Insomnia          Goals addressed in session:           Reason for visit is PHP VIRTUAL GROUP DUE TO COVID-19      Encounter provider [address] PARTIAL [address]    Provider located at [address] [place] [address]   [address] [facility] [id number]      Recent Visits  Date Type Provider Dept   02/14/22 Office Visit [address] PARTIAL PSYCH GROUP THERAPY [facility]   02/11/22 Office Visit [address] PARTIAL PSYCH GROUP THERAPY [facility]   02/10/22 Office Visit [address] PARTIAL PSYCH GROUP THERAPY [facility]   02/09/22 Office Visit [address] PARTIAL PSYCH GROUP THERAPY [facility]   Showing recent visits within past 7 days and meeting all other requirements  Today's Visits  Date Type Provider Dept   02/15/22 Office Visit [address] PARTIAL PSYCH GROUP THERAPY [facility]   Showing today's visits and meeting all other requirements  Future Appointments  No visits were found meeting these conditions  Showing future appointments within next 150 days and meeting all other requirements       The patient was identified by name and date of birth  Chris Ordoñezugh was informed that this is a telemedicine visit and that the visit is being conducted throughMicrosoft Teams and patient was informed that this is a secure, HIPAA-compliant platform  She agrees to proceed     My office door was closed  No one else was in the room  She acknowledged consent and understanding of privacy and security of the video platform   The patient has agreed to participate and understands they can discontinue the visit at any time  Patient is aware this is a billable service  Subjective  Jackelyn Andersen is a 43 y o  female   HPI     Past Medical History:   Diagnosis Date    Anxiety     Borderline personality disorder ([facility] [address] )     Depression     Psychiatric illness     Self-injurious behavior     Suicide attempt ([facility] )        No past surgical history on file  Current Outpatient Medications   Medication Sig Dispense Refill    albuterol (PROVENTIL HFA,VENTOLIN HFA) 90 mcg/act inhaler Inhale 2 puffs every 4 (four) hours as needed for wheezing (Patient not taking: Reported on 1/27/2022 ) 8 g 0    busPIRone (BUSPAR) 30 MG tablet Take 1 tablet (30 mg total) by mouth 2 (two) times a day 60 tablet 1    cyanocobalamin (VITAMIN B-12) 100 mcg tablet Take 1 tablet by mouth every 24 hours      escitalopram (LEXAPRO) 20 mg tablet Take 1 tablet (20 mg total) by mouth daily 30 tablet 1    QUEtiapine (SEROquel XR) 150 mg 24 hr tablet Take 1 tablet (150 mg total) by mouth daily at bedtime 30 tablet 0     No current facility-administered medications for this visit  Allergies   Allergen Reactions    Ceclor [Cefaclor] Hives    Nuts - Food Allergy GI Intolerance    Latex Rash       Review of Systems    Video Exam    There were no vitals filed for this visit  Physical Exam     I spent FOUR GROUP HOURS PLUS CASE MANAGEMENT minutes with patient today in which greater than 50% of the time was spent in counseling/coordination of care regarding PHP - SEE NOTES  VIRTUAL VISIT DISCLAIMER    Zoë Jacob verbally agrees to participate in [employer]  Pt is aware that [employer] could be limited without vital signs or the ability to perform a full hands-on physical exam  Jackelyn Andersen understands she or the provider may request at any time to terminate the video visit and request the patient to seek care or treatment in person

## 2022-02-16 ENCOUNTER — OFFICE VISIT (OUTPATIENT)
Dept: PSYCHOLOGY | Facility: CLINIC | Age: 37
End: 2022-02-16
Payer: COMMERCIAL

## 2022-02-16 ENCOUNTER — TELEMEDICINE (OUTPATIENT)
Dept: PSYCHIATRY | Facility: CLINIC | Age: 37
End: 2022-02-16
Payer: COMMERCIAL

## 2022-02-16 DIAGNOSIS — F32.81 PMDD (PREMENSTRUAL DYSPHORIC DISORDER): ICD-10-CM

## 2022-02-16 DIAGNOSIS — F41.1 GENERALIZED ANXIETY DISORDER: Primary | ICD-10-CM

## 2022-02-16 DIAGNOSIS — G47.00 INSOMNIA, UNSPECIFIED TYPE: ICD-10-CM

## 2022-02-16 DIAGNOSIS — F41.1 GENERALIZED ANXIETY DISORDER: ICD-10-CM

## 2022-02-16 DIAGNOSIS — F33.2 SEVERE EPISODE OF RECURRENT MAJOR DEPRESSIVE DISORDER, WITHOUT PSYCHOTIC FEATURES (HCC): Primary | ICD-10-CM

## 2022-02-16 DIAGNOSIS — F33.2 SEVERE EPISODE OF RECURRENT MAJOR DEPRESSIVE DISORDER, WITHOUT PSYCHOTIC FEATURES (HCC): ICD-10-CM

## 2022-02-16 PROCEDURE — G0177 OPPS/PHP; TRAIN & EDUC SERV: HCPCS

## 2022-02-16 PROCEDURE — 99213 OFFICE O/P EST LOW 20 MIN: CPT | Performed by: PHYSICIAN ASSISTANT

## 2022-02-16 PROCEDURE — G0176 OPPS/PHP;ACTIVITY THERAPY: HCPCS

## 2022-02-16 PROCEDURE — G0410 GRP PSYCH PARTIAL HOSP 45-50: HCPCS

## 2022-02-16 NOTE — PSYCH
Subjective:     Patient ID: Symone Hamlin is a 43 y o  female  [facility] Clinical Progress Notes      Specialized Services Documentation  Therapist must complete separate progress note for each specific clinical activity in which the individual participated during the day  Group Psychotherapy Life Skills ([id number]) Symone Hamlin actively engaged in group focused on using the therapy tool Cards for Calm  Group was facilitated virtually in a private office using HIPAA Compliant and Approved Microsoft Teams  Symone Hamlin consented to the use of tele-video modality of treatment and was virtually present for group psychotherapy today  Cards for calm encourages positive thinking through the use of visualization, mindfulness techniques and creative thinking  This tool helps clients learn to respond in more effective ways  Jose Raul Landers  selected a calming card about being an individual   Cheryletucker Landers continues to make progress towards goals through verbal participation in group; to accomplish long term goals continue to utilize skills learned in programming  Continue with psychotherapy to educate and encourage use of wellness tools   Tx Plan Objective: 1 1,1 2 Therapist: Bruna Galvan

## 2022-02-16 NOTE — PSYCH
Subjective:     Patient ID: Linn Frazier is a 43 y o  female  [facility] Clinical Progress Notes      Specialized Services Documentation  Therapist must complete separate progress note for each specific clinical activity in which the individual participated during the day  Group Psychotherapy  This group was facilitated virtually in a private office using HIPAA Compliant and Approved [employer] Teams  Linn Frazier consented to the use of tele-video modality of treatment  ([id number]) Linn Frazier actively shared in psychotherapy group exploring the benefits of exercise to support mental wellness  Group explored healthy exercises, barriers to exercise, strategies to overcome barriers, and Qigong exercise for beginners  Goodprogress toward goal noted  Continue psychotherapy to encourage self-awareness and home practice of skills to support wellness  Treatment Plan Objective: 1 1, 1 2   Therapist: Lyn SHORE RN      Education Therapy   [id number] Linn Frazier actively shared in morning assessment and goal review  Presented as Receptive related to readiness to learn  Linn Frazier did complete goal from last treatment day identifying gaining hope, education and responsibility  did not present with any barriers to learning  [address] engaged throughout the treatment day  Was engaged in learning related to Illness, Medication, Aftercare, and Wellness Tools  Staff utilized Verbal, Written, A/V, and Demonstration teaching methods  Linn Frazier shared area of learning and set a goal for outside of program to work on her notes  Tx Plan Objective: 1 1,1 2,1 4 Therapist:  Lyn SHORE RN    Case Management Note    Lyn SHORE RN    Current suicide risk : Low     A case management session is not scheduled today with Linn Frazier ; additionally, they did not request a CM meeting    Next scheduled session is Thursday 02/17/2022  Medications changes/added/denied? No    Treatment session number: 15    Individual Case Management Visit provided today?  No    [facility] follow up physician's orders: None at this time

## 2022-02-16 NOTE — PSYCH
Virtual Regular Visit    Verification of patient location:    Patient is located in the following state in which I hold an active license [place]             Reason for visit is   Chief Complaint   Patient presents with    Virtual Regular Visit        Encounter provider Ciraa Hall PA-C    Provider located at [place] [id number] [phone]      Recent Visits  Date Type Provider Dept   02/09/22 Telemedicine Ciara Hall PA-C [facility] recent visits within past 7 days and meeting all other requirements  Future Appointments  No visits were found meeting these conditions  Showing future appointments within next 150 days and meeting all other requirements       The patient was identified by name and date of birth  Solmon Cranker was informed that this is a telemedicine visit and that the visit is being conducted throughMicrosoft Teams and patient was informed that this is a secure, HIPAA-compliant platform  She agrees to proceed     My office door was closed  No one else was in the room  She acknowledged consent and understanding of privacy and security of the video platform  The patient has agreed to participate and understands they can discontinue the visit at any time  Patient is aware this is a billable service  VIRTUAL VISIT DISCLAIMER    Solmon Cranker verbally agrees to participate in [employer]  Pt is aware that [employer] could be limited without vital signs or the ability to perform a full hands-on physical exam  Heather Darlene Homans understands she or the provider may request at any time to terminate the video visit and request the patient to seek care or treatment in person          Progress Note - Behavioral Health   [facility]  Josh JOHNSON 43 y o  female MRN: [id number]     Progress at partial program: improving  Bo Gutierrez seen by Min 2/9 at which time seroquel increased to 150 mg and changed to XR  Bo Gutierrez is sleeping better and "I can get up on time"  Energy and motivation are improved and she is less mood reactive  Still pushes self to do ADLS  Had suicidal thoughts last pm after talking with her boss- "I took the conversation the wrong way" and needed to use additional tools, [address], after a distressing conversation with her   Is successfully using coping tools  No SI today         ROS:   As above otherwise negative    Active Ambulatory Problems     Diagnosis Date Noted    Severe episode of recurrent major depressive disorder, without psychotic features ([facility] ) 12/07/2016    Generalized anxiety disorder 12/07/2016    Candida rash of groin 11/04/2021    Borderline personality disorder ([facility] )     PMDD (premenstrual dysphoric disorder) 11/18/2021    Insomnia 12/10/2021     Resolved Ambulatory Problems     Diagnosis Date Noted    Medical clearance for psychiatric admission 11/04/2021    Bronchitis 11/04/2021     Past Medical History:   Diagnosis Date    Anxiety     Depression     Psychiatric illness     Self-injurious behavior     Suicide attempt ([facility] )      Allergies   Allergen Reactions    Ceclor [Cefaclor] Hives    Nuts - Food Allergy GI Intolerance    Latex Rash          Mental Status Evaluation:    Appearance:  age appropriate, adequate grooming   Behavior:  cooperative   Speech:  normal rate and volume   Mood:  low   Affect:  constricted   Thought Process:  goal directed   Associations: intact associations   Thought Content:  negative thoughts   Perceptual Disturbances: none   Risk Potential: Suicidal ideation - Yes, fleeting suicidal thoughts  Homicidal ideation - None   Sensorium:  oriented to person, place and time/date   Memory:  recent and remote memory grossly intact   Consciousness:  alert and awake   Attention: attention span and concentration are age appropriate   Insight:  improving   Judgment: intact   Gait/Station: unable to assess   Motor Activity: unable to assess today due to virtual visit       Laboratory results: I have personally reviewed all pertinent laboratory/tests results  Assessment   Diagnoses and all orders for this visit:    Severe episode of recurrent major depressive disorder, without psychotic features (HCC)    Generalized anxiety disorder    PMDD (premenstrual dysphoric disorder)       Current Outpatient Medications   Medication Sig Dispense Refill    albuterol (PROVENTIL HFA,VENTOLIN HFA) 90 mcg/act inhaler Inhale 2 puffs every 4 (four) hours as needed for wheezing (Patient not taking: Reported on 1/27/2022 ) 8 g 0    busPIRone (BUSPAR) 30 MG tablet Take 1 tablet (30 mg total) by mouth 2 (two) times a day 60 tablet 1    cyanocobalamin (VITAMIN B-12) 100 mcg tablet Take 1 tablet by mouth every 24 hours      escitalopram (LEXAPRO) 20 mg tablet Take 1 tablet (20 mg total) by mouth daily 30 tablet 1    QUEtiapine (SEROquel XR) 150 mg 24 hr tablet Take 1 tablet (150 mg total) by mouth daily at bedtime 30 tablet 0     No current facility-administered medications for this visit  Plan    Planned medication and treatment changes:  Improving  Cont current meds: seroquel xr 150 mg q bedtime, buspar 30 mg bid and lexapro 20 mg/d  All current active medications have been reviewed  Continue treatment with group therapy and partial program      Risks / Benefits of Treatment:    Risks, benefits, and possible side effects of medications explained to patient and patient verbalizes understanding and agrees to medications  Counseling / Coordination of Care:    Patient's progress discussed with staff in treatment team meeting  Medications, treatment progress and treatment plan reviewed with patient      Vi Townsend PA-C 02/16/22

## 2022-02-16 NOTE — PSYCH
Virtual Regular Visit    Verification of patient location:    Patient is located in the following state in which I hold an [place] license [place]      Assessment/Plan:    Problem List Items Addressed This Visit        Other    Severe episode of recurrent major depressive disorder, without psychotic features ([address] )    Generalized anxiety disorder - Primary    Insomnia          Goals addressed in session:          Reason for visit is PHP VIRTUAL GROUP DUE TO COVID-19      Encounter provider [facility] PARTIAL [address]    Provider located at [address] [place] [address]   [id number] [facility] [id number]      Recent Visits  Date Type Provider Dept   02/15/22 Office Visit [facility] PARTIAL PSYCH GROUP THERAPY [facility]   02/14/22 Office Visit [facility] PARTIAL PSYCH GROUP THERAPY [facility]   02/11/22 Office Visit [facility] PARTIAL PSYCH GROUP THERAPY [facility]   02/10/22 Office Visit [facility] PARTIAL PSYCH GROUP THERAPY [facility]   02/09/22 Office Visit [facility] PARTIAL PSYCH GROUP THERAPY [facility]   Showing recent visits within past 7 days and meeting all other requirements  Today's Visits  Date Type Provider Dept   02/16/22 Office Visit [facility] PARTIAL PSYCH GROUP THERAPY [facility]   Showing today's visits and meeting all other requirements  Future Appointments  No visits were found meeting these conditions  Showing future appointments within next 150 days and meeting all other requirements       The patient was identified by name and date of birth  Tiffany Musa was informed that this is a telemedicine visit and that the visit is being conducted throughMicrosoft Teams and patient was informed that this is a secure, HIPAA-compliant platform  She agrees to proceed     My office door was closed  No one else was in the room  She acknowledged consent and understanding of privacy and security of the video platform   The patient has agreed to participate and understands they can discontinue the visit at any time  Patient is aware this is a billable service  Subjective  Jackelyn Garrison is a 43 y o  female    HPI     Past Medical History:   Diagnosis Date    Anxiety     Borderline [facility]     Depression     Psychiatric illness     Self-injurious behavior     Suicide attempt ([facility] )        No past surgical history on file  Current Outpatient Medications   Medication Sig Dispense Refill    albuterol (PROVENTIL HFA,VENTOLIN HFA) 90 mcg/act inhaler Inhale 2 puffs every 4 (four) hours as needed for wheezing (Patient not taking: Reported on 1/27/2022 ) 8 g 0    busPIRone (BUSPAR) 30 MG tablet Take 1 tablet (30 mg total) by mouth 2 (two) times a day 60 tablet 1    cyanocobalamin (VITAMIN B-12) 100 mcg tablet Take 1 tablet by mouth every 24 hours      escitalopram (LEXAPRO) 20 mg tablet Take 1 tablet (20 mg total) by mouth daily 30 tablet 1    QUEtiapine (SEROquel XR) 150 mg 24 hr tablet Take 1 tablet (150 mg total) by mouth daily at bedtime 30 tablet 0     No current facility-administered medications for this visit  Allergies   Allergen Reactions    Ceclor [Cefaclor] Hives    Nuts - Food Allergy GI Intolerance    Latex Rash       Review of Systems    Video Exam    There were no vitals filed for this visit  Physical Exam     I spent FOUR GROUP HOURS PLUS CASE MANAGEMENT minutes with patient today in which greater than 50% of the time was spent in counseling/coordination of care regarding PHP - SEE NOTES  VIRTUAL VISIT DISCLAIMER    Adalberto Sharon verbally agrees to participate in [employer]   Pt is aware that [employer] could be limited without vital signs or the ability to perform a full hands-on physical exam  Jackelyn Garrison understands she or the provider may request at any time to terminate the video visit and request the patient to seek care or treatment in person

## 2022-02-16 NOTE — PSYCH
Subjective:     Patient ID: Obinna Richards is a 43 y o  female  [facility] Clinical Progress Notes      Specialized Services Documentation  Therapist must complete separate progress note for each specific clinical activity in which the individual participated during the day  Allied Therapy   This group was facilitated virtually in a private office using HIPAA Compliant and Approved [employer] [place] [employer] Teams  Obinna Richards consented to the use of tele-video modality of treatment  [id number] Obinna Richards  actively shared in [facility] group focused on managing anger and emotional regulation skills  Joseluis Cruz engaged in task exploring effective versus ineffective ways in addition to skills to refocus in the moment  She was able to share take aways related to feeling "allowed to feel anger"  Group explored the benefits of positive choices with intense emotion and factors that increase emotional vulnerability  Some  work toward goal noted   Continue AT to explore wellness tool awareness and practice       Tx Plan Objective: 1 1, Therapist:  Keren JOHNSONBC

## 2022-02-17 ENCOUNTER — OFFICE VISIT (OUTPATIENT)
Dept: PSYCHOLOGY | Facility: CLINIC | Age: 37
End: 2022-02-17
Payer: COMMERCIAL

## 2022-02-17 DIAGNOSIS — F32.81 PMDD (PREMENSTRUAL DYSPHORIC DISORDER): ICD-10-CM

## 2022-02-17 DIAGNOSIS — F41.1 GENERALIZED ANXIETY DISORDER: Primary | ICD-10-CM

## 2022-02-17 DIAGNOSIS — F33.2 SEVERE EPISODE OF RECURRENT MAJOR DEPRESSIVE DISORDER, WITHOUT PSYCHOTIC FEATURES (HCC): ICD-10-CM

## 2022-02-17 NOTE — PSYCH
Virtual Regular Visit    Verification of patient location:    Patient is located in the following state in which [place] an [place] license [place]      Assessment/Plan:    Problem List Items Addressed This Visit        Other    Severe episode of recurrent major depressive disorder, without psychotic features ([facility] )    Generalized anxiety disorder - Primary    PMDD (premenstrual dysphoric disorder)          Goals addressed in session:           Reason for visit is PHP VIRTUAL GROUP DUE TO COVID-19      Encounter provider [facility] PARTIAL [address]    Provider located at [address] [place] [address]  [facility] [id number]      Recent Visits  Date Type Provider Dept   02/16/22 Office Visit [facility] PARTIAL PSYCH GROUP THERAPY [facility]   02/15/22 Office Visit [facility] PARTIAL PSYCH GROUP THERAPY [facility]   02/14/22 Office Visit [facility] PARTIAL PSYCH GROUP THERAPY [facility]   02/11/22 Office Visit [facility] PARTIAL PSYCH GROUP THERAPY [facility]   02/10/22 Office Visit [facility] PARTIAL PSYCH GROUP THERAPY [facility]   Showing recent visits within past 7 days and meeting all other requirements  Today's Visits  Date Type Provider Dept   02/17/22 Office Visit [facility] PARTIAL PSYCH GROUP THERAPY [facility]   Showing today's visits and meeting all other requirements  Future Appointments  No visits were found meeting these conditions  Showing future appointments within next 150 days and meeting all other requirements       The patient was identified by name and date of birth  Darrell Lopez was informed that this is a telemedicine visit and that the visit is being conducted throughMicrosoft Teams and patient was informed that this is a secure, HIPAA-compliant platform  She agrees to proceed     My office door was closed  No one else was in the room    She acknowledged consent and understanding of privacy and security of the video platform  The patient has agreed to participate and understands they can discontinue the visit at any time  Patient is aware this is a billable service  Subjective  Jackelyn Garrison is a 43 y o  female   HPI     Past Medical History:   Diagnosis Date    Anxiety     Borderline [facility] disorder [facility]     Depression     Psychiatric illness     Self-injurious behavior     Suicide attempt ([facility] )        No past surgical history on file  Current Outpatient Medications   Medication Sig Dispense Refill    albuterol (PROVENTIL HFA,VENTOLIN HFA) 90 mcg/act inhaler Inhale 2 puffs every 4 (four) hours as needed for wheezing (Patient not taking: Reported on 1/27/2022 ) 8 g 0    busPIRone (BUSPAR) 30 MG tablet Take 1 tablet (30 mg total) by mouth 2 (two) times a day 60 tablet 1    cyanocobalamin (VITAMIN B-12) 100 mcg tablet Take 1 tablet by mouth every 24 hours      escitalopram (LEXAPRO) 20 mg tablet Take 1 tablet (20 mg total) by mouth daily 30 tablet 1    QUEtiapine (SEROquel XR) 150 mg 24 hr tablet Take 1 tablet (150 mg total) by mouth daily at bedtime 30 tablet 0     No current facility-administered medications for this visit  Allergies   Allergen Reactions    Ceclor [Cefaclor] Hives    Nuts - Food Allergy GI Intolerance    Latex Rash       Review of Systems    Video Exam    There were no vitals filed for this visit  Physical Exam     I spent FOUR GROUP HOURS PLUS CASE MANAGEMENT minutes with patient today in which greater than 50% of the time was spent in counseling/coordination of care regarding PHP - SEE NOTES  VIRTUAL VISIT DISCLAIMER    Adalberto Herrera verbally agrees to participate in [employer]   Pt is aware that [employer] could be limited without vital signs or the ability to perform a full hands-on physical exam  Jackelyn Garrison understands she or the provider may request at any time to terminate the video visit and request the patient to seek care or treatment in person

## 2022-02-17 NOTE — PSYCH
Subjective:     Patient ID: Yehuda Bryson is a 43 y o  female  [facility] Clinical Progress Notes      Specialized Services Documentation  Therapist must complete separate progress note for each specific clinical activity in which the individual participated during the day  Group Psychotherapy Life Skills ([id number]) - Yehuda Bryson was not present for group focused on gratitude which was facilitated virtually in a private office using HIPAA Compliant and Approved Microsoft Teams     Tx Plan Objective: 1 1,1 2 Therapist: Veronique Lopez

## 2022-02-17 NOTE — PSYCH
Subjective:     Patient ID: Adalberto Herrera is a 43 y o  female  [facility] Clinical Progress Notes      Specialized Services Documentation  Therapist must complete separate progress note for each specific clinical activity in which the individual participated during the day  GROUP PSYCHOTHERAPY ([id number]) Group was facilitated virtually in a private office using HIPAA Compliant and Approved Microsoft Teams  Adalberto Herrera consented to the use of tele-video modality of treatment and was virtually present for group psychotherapy today  The group engaged in open discussions that allowed for conversations on the following topics; personal disclosures, affirming highs and lows, frustration with difficulty in activities of daily living and acknowledging wellness and coping tools  Members were asked evaluate and rate their wellness journey on a scale from 0 (low) to 10 (high) to explore their progress and identify where they want to go  [facility] expressed that she would be at a 5 today, but yesterday was an 8    she stated her frustrations with being able to feel the good but falling back into the lows  [facility] engaged in different group discussions to encourage and support other members  [facility] demonstrated progress towards goals and objectives through group participation  Continue with psychotherapy    TX Plan Objectives: 1 1, 1 2, 1 4   Therapist: Sage Fairbanks MA, LPC

## 2022-02-17 NOTE — PSYCH
Subjective:     Patient ID: Darrell Lopez is a 43 y o  female  [facility] Clinical Progress Notes      Specialized Services Documentation  Therapist must complete separate progress note for each specific clinical activity in which the individual participated during the day  Group Psychotherapy  This group was facilitated virtually in a private office using HIPAA Compliant and Approved Microsoft Teams  Darrell Lopez consented to the use of tele-video modality of treatment  ([id number]) Darrell Lopez was not present in group on the Wellness Recovery Action Plan  Group members were educated on the background of the WRAP  Members were informed WRAP was emailed to them this AM   Writer explained the benefit of utilizing the WRAP prior to members initiating it  Members then focused on the WRAP: the wellness toolbox, daily plan, identification of triggers and stressors  Next the group began developing action plans to respond to stressors and crisis situations  Group members shared pieces of information from these sections and identified their importance  Writer encouraged the members of group to continue utilizing the packet to develop plans inside and outside of program  Good progress displayed through participation and engagement in topic  Treatment Plan Objectives: 1 1, 1 2  Therapist: Vale WHEELERN RN      Education Therapy   [id number] Darrell Lopez actively shared in morning assessment and goal review  Presented as Receptive related to readiness to learn  Darrell Lopez did complete goal from last treatment day identifying gaining support, education, responsibility and hope  did not present with any barriers to learning  [address] engaged throughout the treatment day  Was engaged in learning related to Illness, Medication, Aftercare, and Wellness Tools  Staff utilized Verbal, Written, A/V, and Demonstration teaching methods    Trina Langley Ghassan Travis was not present in wrap up however her goal was to complete some tasks today that were increasing her anxiety if not done  Tx Plan Objective: 1 1,1 2,1 4 Therapist:  Guilherme SHORE RN    Case Management Note  This case management session was facilitated virtually in a private office using HIPAA Compliant and Approved Microsoft Teams  Mina Funk consented to the use of tele-video modality of treatment  Guilherme SHORE RN    Current suicide risk : Low     [id number] Met with Manjittucker Blessing  Reported she was feeling overwhelmed today due to needing to make phone calls to set up outpatient therapy  Some phone calls were made however did not get a return call for appointment  Concha Carver identified she struggles with needing to know things are complete and having control  Talked through the appointment process for out patient therapy and the recurrent nightmare she has  Discussed the nightmare protocol and a copy of protocol emailed to Concha Laureanofawn Carver is going to take some time today to complete some tasks that are worsening her anxiety if left undone  Excused from remainder of todays groups  Progress noted in that Concha Carver is able to identify why she is having anxiety and coping skills to use or advocating for herself  Concha Carver stated she is safe and does not have any thoughts of harming herself or SI  Medications changes/added/denied? No    Treatment session number: 16    Individual Case Management Visit provided today?  Yes    [facility] follow up physician's orders: None at this time

## 2022-02-18 ENCOUNTER — OFFICE VISIT (OUTPATIENT)
Dept: PSYCHOLOGY | Facility: CLINIC | Age: 37
End: 2022-02-18
Payer: COMMERCIAL

## 2022-02-18 DIAGNOSIS — F41.1 GENERALIZED ANXIETY DISORDER: Primary | ICD-10-CM

## 2022-02-18 DIAGNOSIS — G47.00 INSOMNIA, UNSPECIFIED TYPE: ICD-10-CM

## 2022-02-18 DIAGNOSIS — F33.2 SEVERE EPISODE OF RECURRENT MAJOR DEPRESSIVE DISORDER, WITHOUT PSYCHOTIC FEATURES (HCC): ICD-10-CM

## 2022-02-18 PROCEDURE — G0410 GRP PSYCH PARTIAL HOSP 45-50: HCPCS

## 2022-02-18 PROCEDURE — G0176 OPPS/PHP;ACTIVITY THERAPY: HCPCS

## 2022-02-18 PROCEDURE — G0177 OPPS/PHP; TRAIN & EDUC SERV: HCPCS

## 2022-02-18 NOTE — PSYCH
Subjective:     Patient ID: Philomena Hill is a 43 y o  female  [facility] Clinical Progress Notes      Specialized Services Documentation  Therapist must complete separate progress note for each specific clinical activity in which the individual participated during the day  GROUP PSYCHOTHERAPY ([id number]) Group was facilitated virtually in a private office using HIPAA Compliant and Approved Microsoft Teams  Vick Mccoy consented to the use of tele-video modality of treatment and was virtually present for group psychotherapy today  The group engaged in education and discussion about ways to engage in self-care  The Self-Care Tips worksheet provides information on self-care, including a definition and practical tips for using self-care to reduce stress  Each member was asked to answer the following questions:   What are you currently doing for self-care? What are some barriers you experience when trying to engage in self-care? Which tip might you integrate into your day to help you with self-care? Vick Mccoy seemed very engaged throughout the group session and provided feedback to peers  Vick Mccoy stated that the tip they might integrate is finding things that she enjoys or would like to explore as part of her self-care  Vick Mccoy is encouraged to make progress towards goals and objectives through group participation  They will continue to attend psychotherapy group       Tx plan objective: 1 1, 1 2   Therapist: Edie Uriostegui MA

## 2022-02-18 NOTE — PSYCH
Subjective:     Patient ID: Freddie Velez is a 43 y o  female  [facility] Clinical Progress Notes      Specialized Services Documentation  Therapist must complete separate progress note for each specific clinical activity in which the individual participated during the day  Group Psychotherapy Life Skills ([id number]) Freddie Velez actively engaged in group focused on recognizing accomplishments which was facilitated virtually in a private office using HIPAA Compliant and Approved [employer] Teams  See Saxena consented to the use of tele-video modality of treatment and was virtually present for group psychotherapy today  During group we discussed the accomplishments that they achieved today  We explored the reasons why it is so difficult to acknowledge those accomplishments  Group members watched a video, "To achieve success, start detecting your small wins "  See Saxena shared that her proudest accomplishment is putting art on the wall and she explained the smaller steps she took to achieve that goal   We discussed why it is important to recognize the accomplishments no matter how big or small they seem  See Saxena continues to make progress towards goals through verbal participation in group; to accomplish long term goals continue to utilize skills learned in programming  Continue with psychotherapy to educate and encourage use of wellness tools   Tx Plan Objective: 1 1,1 2 Therapist: Adalid Villalpando

## 2022-02-18 NOTE — PSYCH
Virtual Regular Visit    Verification of patient location:    Patient is located in the following state in which I hold an active license [place]      Assessment/Plan:    Problem List Items Addressed This Visit        Other    Severe episode of recurrent major depressive disorder, without psychotic features ([address] )    Generalized anxiety disorder - Primary    Insomnia          Goals addressed in session:          Reason for visit is PHP VIRTUAL GROUP DUE TO COVID-19       Encounter provider [facility] PARTIAL [address]    Provider located at [address] [place] [address]   [address] [facility] [id number]      Recent Visits  Date Type Provider Dept   02/17/22 Office Visit [facility] PARTIAL PSYCH GROUP THERAPY [facility]   02/16/22 Office Visit [facility] PARTIAL PSYCH GROUP THERAPY [facility]   02/15/22 Office Visit [facility] PARTIAL PSYCH GROUP THERAPY [facility]   02/14/22 Office Visit [facility] PARTIAL PSYCH GROUP THERAPY [facility]   02/11/22 Office Visit [facility] PARTIAL PSYCH GROUP THERAPY [facility]   Showing recent visits within past 7 days and meeting all other requirements  Today's Visits  Date Type Provider Dept   02/18/22 Office Visit [facility] PARTIAL PSYCH GROUP THERAPY [facility]   Showing today's visits and meeting all other requirements  Future Appointments  No visits were found meeting these conditions  Showing future appointments within next 150 days and meeting all other requirements       The patient was identified by name and date of birth  Jeremy Fabian was informed that this is a telemedicine visit and that the visit is being conducted throughMicrosoft Teams and patient was informed that this is a secure, HIPAA-compliant platform  She agrees to proceed     My office door was closed  No one else was in the room  She acknowledged consent and understanding of privacy and security of the video platform   The patient has agreed to participate and understands they can discontinue the visit at any time  Patient is aware this is a billable service  Subjective  Jackelyn Soliman is a 43 y o  female    HPI     Past Medical History:   Diagnosis Date    Anxiety     Borderline personality disorder ([address] )     Depression     Psychiatric illness     Self-injurious behavior     Suicide attempt ([facility] )        No past surgical history on file  Current Outpatient Medications   Medication Sig Dispense Refill    albuterol (PROVENTIL HFA,VENTOLIN HFA) 90 mcg/act inhaler Inhale 2 puffs every 4 (four) hours as needed for wheezing (Patient not taking: Reported on 1/27/2022 ) 8 g 0    busPIRone (BUSPAR) 30 MG tablet Take 1 tablet (30 mg total) by mouth 2 (two) times a day 60 tablet 1    cyanocobalamin (VITAMIN B-12) 100 mcg tablet Take 1 tablet by mouth every 24 hours      escitalopram (LEXAPRO) 20 mg tablet Take 1 tablet (20 mg total) by mouth daily 30 tablet 1    QUEtiapine (SEROquel XR) 150 mg 24 hr tablet Take 1 tablet (150 mg total) by mouth daily at bedtime 30 tablet 0     No current facility-administered medications for this visit  Allergies   Allergen Reactions    Ceclor [Cefaclor] Hives    Nuts - Food Allergy GI Intolerance    Latex Rash       Review of Systems    Video Exam    There were no vitals filed for this visit  Physical Exam     I spent FOUR GROUP HOURS PLUS CASE MANAGEMENT minutes with patient today in which greater than 50% of the time was spent in counseling/coordination of care regarding PHP - SEE NOTES  VIRTUAL VISIT DISCLAIMER    Yehuda Bryson verbally agrees to participate in [employer]   Pt is aware that [employer] could be limited without vital signs or the ability to perform a full hands-on physical exam  Jackelyn Soliman understands she or the provider may request at any time to terminate the video visit and request the patient to seek care or treatment in person

## 2022-02-18 NOTE — PSYCH
Subjective:     Patient ID: Zoë Jacob is a 43 y o  female  [facility] Clinical Progress Notes      Specialized Services Documentation  Therapist must complete separate progress note for each specific clinical activity in which the individual participated during the day  Group Psychotherapy     This group was facilitated virtually in a private office using HIPAA Compliant and Approved [employer] Teams  Zoë Jacob  consented to the use of tele-video modality of treatment  ([id number])  Zoë Jacob  actively shared in psychotherapy group exploring the weekly wellness assessment  Group explored successes and challenges in wellness areas throughout this past week - physical, social, vocational, spiritual, cognitive, and emotional   Good positive progress toward goal noted  Continue psychotherapy to encourage self-awareness and home practice of skills to support wellness  Treatment Plan Objective: 1 1, 1 2, 1 3, 1 4   Therapist: Giselle SHORE RN       Education Therapy   [id number] Zoë Jacob actively shared in morning assessment and goal review  Presented as Receptive related to readiness to learn  Zoë Jacob did complete goal from last treatment day identifying gaining hope, advocacy, support, responsibility  did not present with any barriers to learning  [address] engaged throughout the treatment day  Was engaged in learning related to Illness, Medication, Aftercare, and Wellness Tools  Staff utilized Verbal, Written, A/V, and Demonstration teaching methods  Tx Plan Objective: 1 1,1 2,1 4 Therapist:  Giselle SHORE RN    Case Management Note    Giselle SHORE RN    Current suicide risk : Low     A case management session is not scheduled today with Zoë Jacob ; additionally, they did not request a CM meeting  Next scheduled session is 02/21/2022   Medications changes/added/denied? No    Treatment session number: 17    Individual Case Management Visit provided today?  No    [facility]ations follow up physician's orders: None at this time

## 2022-02-21 ENCOUNTER — OFFICE VISIT (OUTPATIENT)
Dept: PSYCHOLOGY | Facility: CLINIC | Age: 37
End: 2022-02-21
Payer: COMMERCIAL

## 2022-02-21 DIAGNOSIS — F41.1 GENERALIZED ANXIETY DISORDER: Primary | ICD-10-CM

## 2022-02-21 DIAGNOSIS — F33.2 SEVERE EPISODE OF RECURRENT MAJOR DEPRESSIVE DISORDER, WITHOUT PSYCHOTIC FEATURES (HCC): ICD-10-CM

## 2022-02-21 DIAGNOSIS — G47.00 INSOMNIA, UNSPECIFIED TYPE: ICD-10-CM

## 2022-02-21 PROCEDURE — G0410 GRP PSYCH PARTIAL HOSP 45-50: HCPCS

## 2022-02-21 PROCEDURE — G0177 OPPS/PHP; TRAIN & EDUC SERV: HCPCS

## 2022-02-21 PROCEDURE — G0176 OPPS/PHP;ACTIVITY THERAPY: HCPCS

## 2022-02-21 NOTE — PSYCH
Subjective:     Patient ID: Wilfredo Shea is a 43 y o  female  [facility] Clinical Progress Notes      Specialized Services Documentation  Therapist must complete separate progress note for each specific clinical activity in which the individual participated during the day  GROUP PSYCHOTHERAPY ([id number]) Group was facilitated virtually in a private office using HIPAA Compliant and Approved [employer] Teams  Emanuel Davies consented to the use of tele-video modality of treatment and was virtually present for group psychotherapy today  The group engaged in education about the effects of perfectionist thinking  The handout explains the difference between perfectionism and a healthy level of hard work  Group members were then asked to answer the following question:    - In what area of your life do you engage perfectionist thinking the most?    Emanuel Davies seemed fairly engaged throughout the group session and was willing to provide feedback to peers  Emanuel Davies stated that an area of their life where they tend to engage perfectionist thinking the most is her home life  Emanuel Davies is encouraged to make progress towards goals and objectives through group participation and will continue to attend psychotherapy group       Tx plan objective: 1 1, 1 2   Therapist: Garry Nix MA

## 2022-02-21 NOTE — PSYCH
Assessment/Plan:      Diagnoses and all orders for this visit:    Generalized anxiety disorder    Severe episode of recurrent major depressive disorder, without psychotic features ([facility] )    Insomnia, unspecified type          Subjective:     Patient ID: Lisa Madrid is a 43 y o  female  [facility] Treatment Plan   AREAS OF NEED: Anxiety, depression, and insomnia as evidenced by excessive worrying, panic attacks, heaviness on her chest, headaches, hyperventilating, racing heart, shakiness, difficulty sleeping at nights, over eating unhealthy foods, little happiness from doing scrapbooking, and feeling hopeless  due to chronic mental health issues, job stressors, and family stressors  Date Initiated: 01/21/22     Strengths: "organized, good at scrap booking, creative"      LONG TERM GOAL:   Date Initiated: 01/21/22  1 0  I will demonstrate and share two improvements in my emotional regulation skills during attendance to program    Target Date: 02/18/2022  Completion Date:         SHORT TERM OBJECTIVES:      Date Initiated: 01/21/22  1 1 I will recognize when I'm feeling overwhelmed and use identified coping skills to help decrease anxiety  Revision Date: 02/02/2022 continue, 02/11/22 continue   Target Date: 02/02/2022  Completion Date: 02/22/22      Date Initiated: 01/21/22  1 2 I will schedule daily calming/relaxation skill (e g , applied relaxation, progressive muscle relaxation, cue controlled relaxation; mindful breathing; biofeedback) into my routine to manage my anxiety symptoms    Revision Date: 02/02/2022 continue, 02/11/22 continue  Target Date: 02/02/2022  Completion Date: 02/22/22      Date Initiated: 01/21/22  1 3 I will take medications as prescribed and share questions and concerns if arise     Revision Date: 02/02/2022 continue, 02/11/22 continue, 02/22/22 continue  Target Date: 02/02/2022  Completion Date:      Date Initiated: 01/21/22  1 4 I will identify 3 ways my supports can assist in my recovery and agree to staff/support contact as indicated     Revision Date: 02/02/2022 continue, 02/11/22 continue, 02/22/22 continue  Target Date: 02/02/2022  Completion Date:            7 DAY REVISION:     Date Initiated: 02/02/2022  1 5 I will spend 5-10 minutes daily to work on [employer] to support my recovery  Revision Date: 02/11/22  continue, 02/22/22 continue  Target Date: 02/14/2022  Completion Date:     Date Initiated: 02/11/22   1 6 I will work on obtaining outpatient therapist through obtaining provider list and making contacts  Revision Date: 02/22/22 continue  Target Date: 02/23/2022  Completion Date:    Date Initiated: 02/22/22   1 7 I will continue to work on previous set goals to facilitate optimal level of wellness  Revision Date:  Target Date: 03/04/2022  Completion date:         PSYCHIATRY:  Date Initiated:  01/21/22  Medication Management and Education       Revision Date: 02/02/2022 02/11/22 02/22/22       1 3 Continue medication management        The person(s) responsible for carrying out the plan Kevin Schmitt MD; Delia Henderson PA-C     NURSING/SYMPTOM EDUCATION:   Date Initiated: 01/21/22  1 1, 1 2  1 3, 1 4 This RN/Or Therapist will provide wellness/symptoms and skill education groups three to five days weekly to educate Jackelyn Espinoza signs and symptoms of diagnoses, skills to manage, and medication questions that will be addressed by the treatment team     Revision date: 02/02/2022,  02/11/22 02/22/22   1 1,1 2,1 3,1 4,1 5, 1 6 Continue to encourage Aaron Shelton to participate in wellness/symptoms and skills education groups daily to learn about symptoms, coping strategies and warning signs to promote relapse prevention     The person(s) responsible for carrying out the plan is Lizette Meyers RN, BSN     PSYCHOLOGY:   Date Initiated: 01/21/22       1 1, 1 2, 1 4 Provide psychotherapy group 5 times per week to allow opportunity for Taryn mueller stressors and ways of coping  Revision Date: 02/02/2022,  02/11/22 02/22/22   1 1,1 2,1 4,1 5, 1 6  Continue to provide psychotherapy group daily to [employer] and encourage sharing of stressors, skills and positive change  The person(s) responsible for carrying out the plan is NAVDEEP Blake,LSW     ALLIED THERAPY:   Date Initiated: 01/21/22  1 1,1 2 Engage [address] group 5 times weekly to encourage development and use of wellness tools to decrease symptoms and promote recovery through meaningful activity  Revision Date: 02/02/2022, 02/11/22 02/22/22   1 1,1 2,1 5, 1 6 Continue to engage [employer] to participate in AT group to practice wellness tools within program and transfer to home sharing successes and barriers through healthy task involvement         The person(s) responsible for carrying out the plan is PACHECO Claros      CASE MANAGEMENT:   Date Initiated: 01/21/22      1 0 This  will meet with Angelo Nielsen [facility] times weekly to assess treatment progress, discharge planning, connection to community supports and UR as indicated  Revision Date: 02/02/2022, 02/11/22 02/22/22   1 0 Continue to meet with [employer] 3-4 times weekly to assess growth, work toward goals, continued treatment needs, dc planning and use of supports  The person(s) responsible for carrying out the plan is Pura Gottron BSN RN     TREATMENT REVIEW/COMMENTS:      DISCHARGE CRITERIA: Identify 3 signs of progress and complete relapse prevention plan     DISCHARGE PLAN: Connect with identified outpatient providers  Estimated Length of Stay: 10 treatment days      Diagnosis and Treatment Plan explained to Dillon Seth relates understanding diagnosis and is agreeable to Treatment Plan           CLIENT COMMENTS / Please share your thoughts, feelings, need and/or experiences regarding your treatment plan: _____________________________________________________________________________________________________________________________________________________________________________________________________________________________________________________________________________________________________________________ Date/Time: ______________     Patient Signature: _________________________________     Date/Time: ______________      Signature: _________________________________     Date/Time: ______________

## 2022-02-21 NOTE — PSYCH
Subjective:     Patient ID: Linn Frazier is a 43 y o  female  [facility] Clinical Progress Notes      Specialized Services Documentation  Therapist must complete separate progress note for each specific clinical activity in which the individual participated during the day  Group Psychotherapy  This group was facilitated virtually in a private office using HIPAA Compliant and Approved [employer] Teams  Linn Frazier  consented to the use of tele-video modality of treatment  ([id number]) Linn Frazier actively participated in psychoeducation group this afternoon which focused on sleep hygiene  Group shared current barriers contributing to decreased quality of sleep  Group then identified sleep hygiene habits that are currently effective and habits they wish to incorporate into their night time routine to promote sleep hygiene  This writer explained the importance of quality sleep in relation to wellness  Sleep diary and additional tips on sleep hygiene were discussed  Some progress toward goal observed  Continue psychoeducation group to increase awareness of good sleeping habits to promote wellness  Tx Plan Objectives: 1 1, 1 2      Joyce WHEELERN RN     Case Management Note  This case management session was facilitated virtually in a private office using HIPAA Compliant and Approved [employer] Teams  Linn Frazier consented to the use of tele-video modality of treatment  Lyn Pelaez RN, BSN    Current suicide risk : Low     [id number] Met with Linn Frazier  Reported not sleeping well over the weekend and questions if it's due to the increase in Seroquel  Reports feeling "tired all the time now"  Kallie Goodson prefers to discuss at time of med check with Negin BHATT  on Wednesday  Kallie Goodson reports practicing sleep hygiene such as light stretches, wearing a sleep mask, ear plugs, and not drinking any caffeine after 12 noon   Reports "having more better days than bad days"  Meldella Manish identified she is going to start eating healthier and reading before bed for additional sleep hygiene  Meldella Manish identified she is worried about going back to work and how to fit in self care into her routine  Support provided  Reviewed med check date and time for this week  Medications changes/added/denied? No    Treatment session number: 18    Individual Case Management Visit provided today?  Yes     [facility]ations follow up physician's orders: No new orders

## 2022-02-21 NOTE — PSYCH
Subjective:     Patient ID: Adalberto Herrera is a 43 y o  female  [facility] Clinical Progress Notes      Specialized Services Documentation  Therapist must complete separate progress note for each specific clinical activity in which the individual participated during the day  Group Psychotherapy Life Skills ([id number]) Adalberto Herrera actively engaged in group focused on problems which was facilitated virtually in a private office using HIPAA Compliant and Approved [employer] Teams  Ishan Galindo consented to the use of tele-video modality of treatment and was virtually present for group psychotherapy today  During group, clients were asked to describe a problem they would like us to consider and if the problem was a tv show what the name of the show was  They had to describe the tv show  Group members shared details about their tv show and received feedback from group membersHeather continues to make progress towards goals through verbal participation in group; to accomplish long term goals continue to utilize skills learned in programming  Continue with psychotherapy to educate and encourage use of wellness tools  Tx Plan Objective: 1 1,1 2, 1 4 Therapist: Tobias Craig       Education Therapy   [id number] Adalberto Herrera actively shared in morning assessment and goal review  Presented as Receptive related to readiness to learn  Adalberto Herrera did complete goal from last treatment day identifying gaining advocacy, support, education  did not present with any barriers to learning  [address] engaged throughout the treatment day  Was engaged in learning related to Illness, Medication, Aftercare, and Wellness Tools  Staff utilized Verbal, Written, A/V, and Demonstration teaching methods  Adalberto Herrera shared area of learning and set a goal for outside of program to practice sleep hygiene         Tx Plan Objective: 1 1,1 2,1 4 Therapist:  Lloyd Stubbs MSW

## 2022-02-21 NOTE — PSYCH
Virtual Regular Visit    Verification of patient location:    Patient is located in the following state in which I hold an active license [place]      Assessment/Plan:    Problem List Items Addressed This Visit        Other    Severe episode of recurrent major depressive disorder, without psychotic features ([facility] )    Generalized anxiety disorder - Primary    Insomnia          Goals addressed in session:           Reason for visit is PHP VIRTUAL GROUP DUE TO COVID-19       Encounter provider [address] PARTIAL [address]    Provider located at [address] [place] [address]   [address] [facility] [id number]      Recent Visits  Date Type Provider Dept   02/18/22 Office Visit [address] PARTIAL PSYCH GROUP THERAPY [facility]   02/17/22 Office Visit [address] PARTIAL PSYCH GROUP THERAPY [facility]   02/16/22 Office Visit [address] PARTIAL PSYCH GROUP THERAPY [facility]   02/15/22 Office Visit [address] PARTIAL PSYCH GROUP THERAPY [facility]   02/14/22 Office Visit [address] PARTIAL PSYCH GROUP THERAPY [facility]   Showing recent visits within past 7 days and meeting all other requirements  Today's Visits  Date Type Provider Dept   02/21/22 Office Visit [address] PARTIAL PSYCH GROUP THERAPY [facility]   Showing today's visits and meeting all other requirements  Future Appointments  No visits were found meeting these conditions  Showing future appointments within next 150 days and meeting all other requirements       The patient was identified by name and date of birth  Anna Bee was informed that this is a telemedicine visit and that the visit is being conducted throughMicrosoft Teams and patient was informed that this is a secure, HIPAA-compliant platform  She agrees to proceed     My office door was closed  No one else was in the room  She acknowledged consent and understanding of privacy and security of the video platform   The patient has agreed to participate and understands they can discontinue the visit at any time  Patient is aware this is a billable service  Subjective  Jackelyn Herron is a 43 y o  female   HPI     Past Medical History:   Diagnosis Date    Anxiety     Borderline personality disorder ([facility] )     Depression     Psychiatric illness     Self-injurious behavior     Suicide attempt ([facility] )        No past surgical history on file  Current Outpatient Medications   Medication Sig Dispense Refill    albuterol (PROVENTIL HFA,VENTOLIN HFA) 90 mcg/act inhaler Inhale 2 puffs every 4 (four) hours as needed for wheezing (Patient not taking: Reported on 1/27/2022 ) 8 g 0    busPIRone (BUSPAR) 30 MG tablet Take 1 tablet (30 mg total) by mouth 2 (two) times a day 60 tablet 1    cyanocobalamin (VITAMIN B-12) 100 mcg tablet Take 1 tablet by mouth every 24 hours      escitalopram (LEXAPRO) 20 mg tablet Take 1 tablet (20 mg total) by mouth daily 30 tablet 1    QUEtiapine (SEROquel XR) 150 mg 24 hr tablet Take 1 tablet (150 mg total) by mouth daily at bedtime 30 tablet 0     No current facility-administered medications for this visit  Allergies   Allergen Reactions    Ceclor [Cefaclor] Hives    Nuts - Food Allergy GI Intolerance    Latex Rash       Review of Systems    Video Exam    There were no vitals filed for this visit  Physical Exam     I spent FOUR GROUP HOURS PLUS CASE MANAGEMENT minutes with patient today in which greater than 50% of the time was spent in counseling/coordination of care regarding PHP - SEE NOTES  VIRTUAL VISIT DISCLAIMER    Jose Freeman verbally agrees to participate in [employer]   Pt is aware that [employer] could be limited without vital signs or the ability to perform a full hands-on physical exam  Jackelyn Herron understands she or the provider may request at any time to terminate the video visit and request the patient to seek care or treatment in person

## 2022-02-22 ENCOUNTER — OFFICE VISIT (OUTPATIENT)
Dept: PSYCHOLOGY | Facility: CLINIC | Age: 37
End: 2022-02-22
Payer: COMMERCIAL

## 2022-02-22 DIAGNOSIS — F33.2 SEVERE EPISODE OF RECURRENT MAJOR DEPRESSIVE DISORDER, WITHOUT PSYCHOTIC FEATURES (HCC): ICD-10-CM

## 2022-02-22 DIAGNOSIS — G47.00 INSOMNIA, UNSPECIFIED TYPE: ICD-10-CM

## 2022-02-22 DIAGNOSIS — F41.1 GENERALIZED ANXIETY DISORDER: Primary | ICD-10-CM

## 2022-02-22 PROCEDURE — G0177 OPPS/PHP; TRAIN & EDUC SERV: HCPCS

## 2022-02-22 PROCEDURE — G0410 GRP PSYCH PARTIAL HOSP 45-50: HCPCS

## 2022-02-22 PROCEDURE — G0176 OPPS/PHP;ACTIVITY THERAPY: HCPCS

## 2022-02-22 NOTE — PSYCH
Subjective:     Patient ID: Marya Vanegas is a 43 y o  female  [facility] Clinical Progress Notes      Specialized Services Documentation  Therapist must complete separate progress note for each specific clinical activity in which the individual participated during the day  Education Therapy     [id number] Marya Vanegas engaged throughout the treatment day  Was engaged in learning related to Illness, Medication, Aftercare, and Wellness Tools  Staff utilized Verbal, Written, A/V, and Demonstration teaching methods    Marya Vanegas shared area of learning and set a goal for outside of program to look for and call a DBT therapist       Tx Plan Objective: 1 1,1 2,1 4 Therapist:  NAVDEEP Parmar

## 2022-02-22 NOTE — PSYCH
Subjective:     Patient ID: Travis Lilly is a 43 y o  female  [facility] Clinical Progress Notes      Specialized Services Documentation  Therapist must complete separate progress note for each specific clinical activity in which the individual participated during the day  Allied Therapy   This group was facilitated virtually in a private office using HIPAA Compliant and Approved [employer] Teams  Travis Lilly consented to the use of tele-video modality of treatment  [id number] Travis Lilly  actively shared in [facility] group focused on radical acceptance and the concept of letting go  Engaged in therapist led "acceptance by the chair" DBT exercise  During group discussion on letting go, Markus Sigala participated in group reading and asking relevant questions  She utilized group to address and work on stressful work fears  Group reinforced importance of consistently caring for ones self and use of strategies explored to refocus to the present  Some positive progress toward goal   Continue AT to increase skill awareness and use of skills consistently       Tx Plan Objective: 1 1, Therapist:  Joi Gutierres MT-BC

## 2022-02-22 NOTE — PSYCH
Subjective:     Patient ID: Obinna Richards is a 43 y o  female  [facility] Clinical Progress Notes      Specialized Services Documentation  Therapist must complete separate progress note for each specific clinical activity in which the individual participated during the day  GROUP PSYCHOTHERAPY ([id number]) Group was facilitated virtually in a private office using HIPAA Compliant and Approved Microsoft Teams  Bakarialicia Cruz consented to the use of tele-video modality of treatment and was virtually present for group psychotherapy today  The group engaged in education about Forgiveness therapy  Members processed their emotions regarding the difficulties of forgiveness and identified strengths that can come with forgiving themselves  The group focused on learning different facts versus myths with regards to forgiveness and then were asked the following questions:   What myth were you most surprised by? Where/when did you learn about forgiveness? Who taught you about forgiveness? Joseluis Cruz seemed fairly engaged throughout the group session  Joseluis Cruz stated that the myth they were most surprised by was that forgiveness takes time and can take years to achieve  Joseluis Anthony continues to make progress towards goals through verbal participation in group and will continue to attend psychotherapy group       Tx plan objective: 1 1, 1 2   Therapist: Jeanne Boland MA

## 2022-02-22 NOTE — PSYCH
Virtual Regular Visit    Verification of patient location:    Patient is located in the following state in which I hold an active license [place]      Assessment/Plan:    Problem List Items Addressed This Visit        Other    Severe episode of recurrent major depressive disorder, without psychotic features ([address] )    Generalized anxiety disorder - Primary    Insomnia          Goals addressed in session:           Reason for visit is PHP VIRTUAL GROUP DUE TO COVID-19   Encounter provider [address] PARTIAL PSYCH PROGRAM    Provider located at [address] [place] [address]   [address] [address] Shanna Shelli [id number]      Recent Visits  Date Type Provider Dept   02/21/22 Office Visit [address] PARTIAL PSYCH GROUP THERAPY [facility]   02/18/22 Office Visit [address] PARTIAL PSYCH GROUP THERAPY [facility]   02/17/22 Office Visit [address] PARTIAL PSYCH GROUP THERAPY [facility]   02/16/22 Office Visit [address] PARTIAL PSYCH GROUP THERAPY [facility]   02/15/22 Office Visit [address] PARTIAL PSYCH GROUP THERAPY [facility]   Showing recent visits within past 7 days and meeting all other requirements  Today's Visits  Date Type Provider Dept   02/22/22 Office Visit [address] PARTIAL PSYCH GROUP THERAPY [facility]   Showing today's visits and meeting all other requirements  Future Appointments  No visits were found meeting these conditions  Showing future appointments within next 150 days and meeting all other requirements       The patient was identified by name and date of birth  Aaron Shelton was informed that this is a telemedicine visit and that the visit is being conducted throughMicrosoft Teams and patient was informed that this is a secure, HIPAA-compliant platform  She agrees to proceed     My office door was closed  No one else was in the room  She acknowledged consent and understanding of privacy and security of the video platform   The patient has agreed to participate and understands they can discontinue the visit at any time  Patient is aware this is a billable service  Subjective  Jackelyn Pablo is a 43 y o  female   HPI     Past Medical History:   Diagnosis Date    Anxiety     [facility]line [facility]     Depression     Psychiatric illness     Self-injurious behavior     Suicide attempt ([facility] )        No past surgical history on file  Current Outpatient Medications   Medication Sig Dispense Refill    albuterol (PROVENTIL HFA,VENTOLIN HFA) 90 mcg/act inhaler Inhale 2 puffs every 4 (four) hours as needed for wheezing (Patient not taking: Reported on 1/27/2022 ) 8 g 0    busPIRone (BUSPAR) 30 MG tablet Take 1 tablet (30 mg total) by mouth 2 (two) times a day 60 tablet 1    cyanocobalamin (VITAMIN B-12) 100 mcg tablet Take 1 tablet by mouth every 24 hours      escitalopram (LEXAPRO) 20 mg tablet Take 1 tablet (20 mg total) by mouth daily 30 tablet 1    QUEtiapine (SEROquel XR) 150 mg 24 hr tablet Take 1 tablet (150 mg total) by mouth daily at bedtime 30 tablet 0     No current facility-administered medications for this visit  Allergies   Allergen Reactions    Ceclor [Cefaclor] Hives    Nuts - Food Allergy GI Intolerance    Latex Rash       Review of Systems    Video Exam    There were no vitals filed for this visit  Physical Exam     I spent FOUR GROUP HOURS PLUS CASE MANAGEMENT minutes with patient today in which greater than 50% of the time was spent in counseling/coordination of care regarding PHP - SEE NOTES  VIRTUAL VISIT DISCLAIMER    Rayna Cheung verbally agrees to participate in [employer]   Pt is aware that [employer] could be limited without vital signs or the ability to perform a full hands-on physical exam  Jackelyn Pablo understands she or the provider may request at any time to terminate the video visit and request the patient to seek care or treatment in person

## 2022-02-22 NOTE — PSYCH
Subjective:     Patient ID: Yehuda Bryson is a 43 y o  female  [facility] Clinical Progress Notes      Specialized Services Documentation  Therapist must complete separate progress note for each specific clinical activity in which the individual participated during the day  Group Psychotherapy  This group was facilitated virtually in a private office using HIPAA Compliant and Approved Microsoft Teams  Yehuda Bryson  consented to the use of tele-video modality of treatment  ([id number]) Yehuda Bryson actively shared in psychoeducational group which focused on nutrition to improve physical and mental wellbeing  Topics included appropriate serving sizes for all food groups as well as benefits to eating for health  They then discussed ideas on how to improve on their nutrition  Good progress toward goal noted  Continue psychoeducation to educate on the importance of making nutrition a priority  Tx Plan Objectives: 1 1,1 2    Therapist: Dashawn SHORE RN    Other: Awaiting return call from [address] [place] [address]  for additional treatment days  Education Therapy   [id number] Yehuda Bryson actively shared in morning assessment and goal review  Presented as Receptive related to readiness to learn  Yehuda Bryson did complete goal from last treatment day identifying gaining hope, education, advocacy, responsibility and support  did not present with any barriers to learning  Tx Plan Objective: 1 1,1 2,1 4 Therapist:  Dashawn SHORE RN    Case Management Note    Dashawn SHORE RN    Current suicide risk : Low     A case management session is not scheduled today with Yehuda Bryson ; additionally, they did not request a CM meeting  Next scheduled session is Wednesday 02/23/2022  Medications changes/added/denied? No    Treatment session number: 20    Individual Case Management Visit provided today?  No    [facility] follow up physician's orders: None at this time

## 2022-02-23 ENCOUNTER — TELEMEDICINE (OUTPATIENT)
Dept: PSYCHIATRY | Facility: CLINIC | Age: 37
End: 2022-02-23
Payer: COMMERCIAL

## 2022-02-23 ENCOUNTER — OFFICE VISIT (OUTPATIENT)
Dept: PSYCHOLOGY | Facility: CLINIC | Age: 37
End: 2022-02-23
Payer: COMMERCIAL

## 2022-02-23 DIAGNOSIS — F33.2 SEVERE EPISODE OF RECURRENT MAJOR DEPRESSIVE DISORDER, WITHOUT PSYCHOTIC FEATURES (HCC): ICD-10-CM

## 2022-02-23 DIAGNOSIS — G47.00 INSOMNIA, UNSPECIFIED TYPE: ICD-10-CM

## 2022-02-23 DIAGNOSIS — F41.1 GENERALIZED ANXIETY DISORDER: ICD-10-CM

## 2022-02-23 DIAGNOSIS — F33.2 SEVERE EPISODE OF RECURRENT MAJOR DEPRESSIVE DISORDER, WITHOUT PSYCHOTIC FEATURES (HCC): Primary | ICD-10-CM

## 2022-02-23 DIAGNOSIS — F41.1 GENERALIZED ANXIETY DISORDER: Primary | ICD-10-CM

## 2022-02-23 PROCEDURE — 99213 OFFICE O/P EST LOW 20 MIN: CPT | Performed by: PHYSICIAN ASSISTANT

## 2022-02-23 PROCEDURE — G0176 OPPS/PHP;ACTIVITY THERAPY: HCPCS

## 2022-02-23 PROCEDURE — G0410 GRP PSYCH PARTIAL HOSP 45-50: HCPCS

## 2022-02-23 PROCEDURE — G0177 OPPS/PHP; TRAIN & EDUC SERV: HCPCS

## 2022-02-23 RX ORDER — QUETIAPINE 150 MG/1
150 TABLET, FILM COATED, EXTENDED RELEASE ORAL
Qty: 30 TABLET | Refills: 1 | Status: SHIPPED | OUTPATIENT
Start: 2022-02-23 | End: 2022-03-24 | Stop reason: SDUPTHER

## 2022-02-23 NOTE — PSYCH
Virtual Regular Visit    Verification of patient location:    Patient is located in the following state in which I hold an active license [place]      Assessment/Plan:    Problem List Items Addressed This Visit        Other    Severe episode of recurrent major depressive disorder, without psychotic features ([address] )    Generalized anxiety disorder - Primary    Insomnia          Goals addressed in session:           Reason for visit is PHP VIRTUAL GROUP DUE TO COVID-19      Encounter provider [facility] PARTIAL [address]    Provider located at [address] [place] [address]   [id number] [facility] [id number]      Recent Visits  Date Type Provider Dept   02/22/22 Office Visit [facility] PARTIAL PSYCH GROUP THERAPY [facility]   02/21/22 Office Visit [facility] PARTIAL PSYCH GROUP THERAPY [facility]   02/18/22 Office Visit [facility] PARTIAL PSYCH GROUP THERAPY [facility]   02/17/22 Office Visit [facility] PARTIAL PSYCH GROUP THERAPY [facility]   02/16/22 Office Visit [facility] PARTIAL PSYCH GROUP THERAPY [facility]   Showing recent visits within past 7 days and meeting all other requirements  Today's Visits  Date Type Provider Dept   02/23/22 Office Visit [facility] PARTIAL PSYCH GROUP THERAPY [facility]   Showing today's visits and meeting all other requirements  Future Appointments  No visits were found meeting these conditions  Showing future appointments within next 150 days and meeting all other requirements       The patient was identified by name and date of birth  Laila Cho was informed that this is a telemedicine visit and that the visit is being conducted throughMicrosoft Teams and patient was informed that this is a secure, HIPAA-compliant platform  She agrees to proceed     My office door was closed  No one else was in the room  She acknowledged consent and understanding of privacy and security of the video platform   The patient has agreed to participate and understands they can discontinue the visit at any time  Patient is aware this is a billable service  Subjective  Jackelyn Hay is a 43 y o  female   HPI     Past Medical History:   Diagnosis Date    Anxiety     Borderline personality disorder ([facility] )     Depression     Psychiatric illness     Self-injurious behavior     Suicide attempt ([facility] )        No past surgical history on file  Current Outpatient Medications   Medication Sig Dispense Refill    albuterol (PROVENTIL HFA,VENTOLIN HFA) 90 mcg/act inhaler Inhale 2 puffs every 4 (four) hours as needed for wheezing (Patient not taking: Reported on 1/27/2022 ) 8 g 0    busPIRone (BUSPAR) 30 MG tablet Take 1 tablet (30 mg total) by mouth 2 (two) times a day 60 tablet 1    cyanocobalamin (VITAMIN B-12) 100 mcg tablet Take 1 tablet by mouth every 24 hours      escitalopram (LEXAPRO) 20 mg tablet Take 1 tablet (20 mg total) by mouth daily 30 tablet 1    QUEtiapine (SEROquel XR) 150 mg 24 hr tablet Take 1 tablet (150 mg total) by mouth daily at bedtime 30 tablet 1     No current facility-administered medications for this visit  Allergies   Allergen Reactions    Ceclor [Cefaclor] Hives    Nuts - Food Allergy GI Intolerance    Latex Rash       Review of Systems    Video Exam    There were no vitals filed for this visit  Physical Exam     I spent FOUR GROUP HOURS PLUS CASE MANAGEMENT minutes with patient today in which greater than 50% of the time was spent in counseling/coordination of care regarding PHP - SEE NOTES  VIRTUAL VISIT DISCLAIMER    Philomena Hill verbally agrees to participate in [employer]   Pt is aware that [employer] could be limited without vital signs or the ability to perform a full hands-on physical exam  Jackelyn Hay understands she or the provider may request at any time to terminate the video visit and request the patient to seek care or treatment in person

## 2022-02-23 NOTE — PSYCH
Subjective:     Patient ID: Marya Vanegas is a 43 y o  female  [facility] Clinical Progress Notes      Specialized Services Documentation  Therapist must complete separate progress note for each specific clinical activity in which the individual participated during the day  Other  1302 Returned phone call from [address] [place] [address] Dr Joseph left requesting 9 IOP days from 02/23/2022 through 03/11/2022  Clinical update left on confidential voicemail  Case Management Note  This case management session was facilitated virtually in a private office using HIPAA Compliant and Approved Microsoft Teams  Mraya Vanegas consented to the use of tele-video modality of treatment  Iliana Fenton RN, BSN    Current suicide risk : Low     [id number] Met with Marya Vanegas  Reported she is having difficulty finding therapist practicing DBT, several options provided previously  Geovani Cooney is working with her insurance company to find therapist in network  Progress noted in that Geovani Cooney is using her coping skills to manage stressful situations and increase episodes of anxiety  Requests IOP vs discharge  Barriers continue to be outpatient therapist/DBT  Reviewed treatment plan  Medications changes/added/denied? No    Treatment session number: 20    Individual Case Management Visit provided today?  Yes     [facility] follow up physician's orders: No new orders

## 2022-02-23 NOTE — PSYCH
Subjective:     Patient ID: Micheline Sandoval is a 43 y o  female  [facility] Clinical Progress Notes      Specialized Services Documentation  Therapist must complete separate progress note for each specific clinical activity in which the individual participated during the day  Allied Therapy   This group was facilitated virtually in a private office using HIPAA Compliant and Approved [email or web address] Teams  Micheline Sandoval consented to the use of tele-video modality of treatment  [address]  actively  shared in [place] group focused on boundary setting  She engaged in improvisation and discussion exploring value of and ways to set healthy limits with self and others  DBT strategy GIVE and ways to voice boundaries offered  She participated in practicing boundaries with given scenarios  Some positive  work toward goal noted  Continue AT groups to encourage skill development and use         Tx Plan Objective: 1 1,1 4, Therapist:  Germán BERGMAN

## 2022-02-23 NOTE — PSYCH
Subjective:     Patient ID: Judy Thakkar is a 43 y o  female  [facility] Clinical Progress Notes      Specialized Services Documentation  Therapist must complete separate progress note for each specific clinical activity in which the individual participated during the day  Group Psychotherapy  Life Skills ([id number]) Judy Thakkar actively engaged in group focused on their personal narrative using the tree of life tool  which was facilitated virtually in a private office using HIPAA Compliant and Approved Microsoft Teams  Starr Uribe consented to the use of tele-video modality of treatment and was virtually present for group psychotherapy today  The group watched a FELICIANO talk The importance of sharing your story with speaker Eva Rodrigues  The group created their own tree of life which represents who they are  They had to write words that answered the questions about each part of the tree which represents them  Clients identified what they learned by doing this activity  Starr Uribe recognized that she needs learn to life herself in order to grow  Starr Uribe created a goal to work towards accomplishing the one area that she wants to grow  Group members discussed why it is important to share their story with at least one person  Starr Uribe continues to make progress towards goals through verbal participation in group; to accomplish long term goals continue to utilize skills learned in programming  Continue with psychotherapy to educate and encourage use of wellness tools   Tx Plan Objective: 1 1,1 2 Therapist: Harpreet Saucedo

## 2022-02-23 NOTE — PSYCH
Virtual Regular Visit    Verification of patient location:    Patient is located in the following state in which I hold an active license [place]             Reason for visit is   Chief Complaint   Patient presents with    Virtual Regular Visit        Encounter provider Antonieta Gutiérrez PA-C    Provider located at [address] [address] [phone]      Recent Visits  Date Type Provider Dept   02/16/22 Telemedicine Antonieta Gutiérrez PA-C [address] recent visits within past 7 days and meeting all other requirements  Future Appointments  No visits were found meeting these conditions  Showing future appointments within next 150 days and meeting all other requirements       The patient was identified by name and date of birth  Travis Lilly was informed that this is a telemedicine visit and that the visit is being conducted throughMicrosoft Teams and patient was informed that this is a secure, HIPAA-compliant platform  She agrees to proceed     My office door was closed  The patient was notified the following individuals were present in the room [facility], JOSELINE  She acknowledged consent and understanding of privacy and security of the video platform  The patient has agreed to participate and understands they can discontinue the visit at any time  Patient is aware this is a billable service  VIRTUAL VISIT DISCLAIMER    Travis Lilly verbally agrees to participate in [employer]  Pt is aware that [employer] could be limited without vital signs or the ability to perform a full hands-on physical exam  Jackelyn Ramos understands she or the provider may request at any time to terminate the video visit and request the patient to seek care or treatment in   person        Progress Note - Behavioral Health   [facility], [address] [facility] 43 y o  female MRN: [id number]     Progress at partial program: improving  Emanuel Davies seen by RUBY 2/16 at which time meds unchanged  Emanuel Davies says she is "feeling good"  Has some daytime tiredness  Sleep is ok with good sleep hygiene  Still has intermittent SI - no intent -using coping skills successfully to manage  Current precipitant- "how hard I have to work to be functional"          ROS:   As above otherwise negative    Active Ambulatory Problems     Diagnosis Date Noted    Severe episode of recurrent major depressive disorder, without psychotic features ([facility] ) 12/07/2016    Generalized anxiety disorder 12/07/2016    Candida rash of groin 11/04/2021    Borderline personality disorder ([facility] )     PMDD (premenstrual dysphoric disorder) 11/18/2021    Insomnia 12/10/2021     Resolved Ambulatory Problems     Diagnosis Date Noted    Medical clearance for psychiatric admission 11/04/2021    Bronchitis 11/04/2021     Past Medical History:   Diagnosis Date    Anxiety     Depression     Psychiatric illness     Self-injurious behavior     Suicide attempt ([place] [address] )      Allergies   Allergen Reactions    Ceclor [Cefaclor] Hives    Nuts - Food Allergy GI Intolerance    Latex Rash          Mental Status Evaluation:    Appearance:  age appropriate, adequate grooming   Behavior:  pleasant, cooperative   Speech:  normal rate and volume   Mood:  improved   Affect:  brighter   Thought Process:  goal directed   Associations: intact associations   Thought Content:  no overt delusions   Perceptual Disturbances: none   Risk Potential: Suicidal ideation - Yes, fleeting suicidal thoughts  Homicidal ideation - None   Sensorium:  oriented to person, place and time/date   Memory:  recent and remote memory grossly intact   Consciousness:  alert and awake   Attention: attention span and concentration are age appropriate   Insight:  intact   Judgment: intact   Gait/Station: unable to assess   Motor Activity: unable to assess today due to virtual visit       Laboratory results: I have personally reviewed all pertinent laboratory/tests results  Assessment   Diagnoses and all orders for this visit:    Severe episode of recurrent major depressive disorder, without psychotic features (HCC)  -     QUEtiapine (SEROquel XR) 150 mg 24 hr tablet; Take 1 tablet (150 mg total) by mouth daily at bedtime    Generalized anxiety disorder       Current Outpatient Medications   Medication Sig Dispense Refill    albuterol (PROVENTIL HFA,VENTOLIN HFA) 90 mcg/act inhaler Inhale 2 puffs every 4 (four) hours as needed for wheezing (Patient not taking: Reported on 1/27/2022 ) 8 g 0    busPIRone (BUSPAR) 30 MG tablet Take 1 tablet (30 mg total) by mouth 2 (two) times a day 60 tablet 1    cyanocobalamin (VITAMIN B-12) 100 mcg tablet Take 1 tablet by mouth every 24 hours      escitalopram (LEXAPRO) 20 mg tablet Take 1 tablet (20 mg total) by mouth daily 30 tablet 1    QUEtiapine (SEROquel XR) 150 mg 24 hr tablet Take 1 tablet (150 mg total) by mouth daily at bedtime 30 tablet 1     No current facility-administered medications for this visit  Plan    Planned medication and treatment changes:  No med change: cont seroquel xr 150 mg/d, Buspar 30 mg bid, lexapro 20 mg/d  [id number] Micheline Jones for discharge 2/25  Has f/u Dr Jack Given 3/24  All current active medications have been reviewed  Continue treatment with group therapy and partial program  Discharge planning      Risks / Benefits of Treatment:    Risks, benefits, and possible side effects of medications explained to patient and patient verbalizes understanding and agrees to medications  Counseling / Coordination of Care:    Patient's progress discussed with staff in treatment team meeting  Medications, treatment progress and treatment plan reviewed with patient      Bernie Sage PA-C 02/23/22

## 2022-02-24 ENCOUNTER — APPOINTMENT (OUTPATIENT)
Dept: PSYCHOLOGY | Facility: CLINIC | Age: 37
End: 2022-02-24
Payer: COMMERCIAL

## 2022-02-24 ENCOUNTER — DOCUMENTATION (OUTPATIENT)
Dept: PSYCHOLOGY | Facility: CLINIC | Age: 37
End: 2022-02-24

## 2022-02-24 NOTE — PROGRESS NOTES
Subjective:     Patient ID: Darrell Lopez is a 43 y o  female  [facility] Clinical Progress Notes      Specialized Services Documentation  Therapist must complete separate progress note for each specific clinical activity in which the individual participated during the day  Trina JOHNSON overslept for PHP today  This writer called Cindy Hammond, she is going to take the remainder of day to be off to make phone calls for follow up care       Vale SHORE RN

## 2022-02-25 ENCOUNTER — OFFICE VISIT (OUTPATIENT)
Dept: PSYCHOLOGY | Facility: CLINIC | Age: 37
End: 2022-02-25
Payer: COMMERCIAL

## 2022-02-25 DIAGNOSIS — F41.1 GENERALIZED ANXIETY DISORDER: Primary | ICD-10-CM

## 2022-02-25 DIAGNOSIS — F33.2 SEVERE EPISODE OF RECURRENT MAJOR DEPRESSIVE DISORDER, WITHOUT PSYCHOTIC FEATURES (HCC): ICD-10-CM

## 2022-02-25 DIAGNOSIS — G47.00 INSOMNIA, UNSPECIFIED TYPE: ICD-10-CM

## 2022-02-25 PROCEDURE — G0176 OPPS/PHP;ACTIVITY THERAPY: HCPCS

## 2022-02-25 PROCEDURE — G0410 GRP PSYCH PARTIAL HOSP 45-50: HCPCS

## 2022-02-25 PROCEDURE — G0177 OPPS/PHP; TRAIN & EDUC SERV: HCPCS

## 2022-02-25 NOTE — PSYCH
Virtual Regular Visit    Verification of patient location:    Patient is located in the following state in which I hold an active license [place]      Assessment/Plan:    Problem List Items Addressed This Visit        Other    Severe episode of recurrent major depressive disorder, without psychotic features ([facility] [address] )    Generalized anxiety disorder - Primary    Insomnia          Goals addressed in session:           Reason for visit is      Encounter provider Mona Cornell    Provider located at [address] [place] [phone]      Recent Visits  Date Type Provider Dept   02/23/22 Office Visit [address] PARTIAL PSYCH GROUP THERAPY [facility]   02/22/22 Office Visit [address] PARTIAL PSYCH GROUP THERAPY [facility]   02/21/22 Office Visit [address] PARTIAL PSYCH GROUP THERAPY [facility]   02/18/22 Office Visit [address] PARTIAL PSYCH GROUP THERAPY [facility]   Showing recent visits within past 7 days and meeting all other requirements  Today's Visits  Date Type Provider Dept   02/25/22 Office Visit [address] PARTIAL PSYCH GROUP THERAPY [facility]   Showing today's visits and meeting all other requirements  Future Appointments  No visits were found meeting these conditions  Showing future appointments within next 150 days and meeting all other requirements       The patient was identified by name and date of birth  Travis Maxx was informed that this is a telemedicine visit and that the visit is being conducted throughMicrosoft Teams and patient was informed that this is a secure, HIPAA-compliant platform  She agrees to proceed     My office door was closed  No one else was in the room  She acknowledged consent and understanding of privacy and security of the video platform  The patient has agreed to participate and understands they can discontinue the visit at any time      Patient is aware this is a billable service  Subjective  Jackelyn Roa is a 43 y o  female   HPI     Past Medical History:   Diagnosis Date    Anxiety     Borderline personality disorder ([facility] [address] )     Depression     Psychiatric illness     Self-injurious behavior     Suicide attempt ([facility] [address] )        No past surgical history on file  Current Outpatient Medications   Medication Sig Dispense Refill    albuterol (PROVENTIL HFA,VENTOLIN HFA) 90 mcg/act inhaler Inhale 2 puffs every 4 (four) hours as needed for wheezing (Patient not taking: Reported on 1/27/2022 ) 8 g 0    busPIRone (BUSPAR) 30 MG tablet Take 1 tablet (30 mg total) by mouth 2 (two) times a day 60 tablet 1    cyanocobalamin (VITAMIN B-12) 100 mcg tablet Take 1 tablet by mouth every 24 hours      escitalopram (LEXAPRO) 20 mg tablet Take 1 tablet (20 mg total) by mouth daily 30 tablet 1    QUEtiapine (SEROquel XR) 150 mg 24 hr tablet Take 1 tablet (150 mg total) by mouth daily at bedtime 30 tablet 1     No current facility-administered medications for this visit  Allergies   Allergen Reactions    Ceclor [Cefaclor] Hives    Nuts - Food Allergy GI Intolerance    Latex Rash       Review of Systems    Video Exam    There were no vitals filed for this visit  Physical Exam     I spent FOUR GROUP HOURS PLUS CASE MANAGEMENT minutes with patient today in which greater than 50% of the time was spent in counseling/coordination of care regarding PHP - SEE NOTES  VIRTUAL VISIT DISCLAIMER    Lia Pack verbally agrees to participate in [facility]  Pt is aware that [facility] could be limited without vital signs or the ability to perform a full hands-on physical exam  Jackelyn Roa understands she or the provider may request at any time to terminate the video visit and request the patient to seek care or treatment in person

## 2022-02-25 NOTE — PSYCH
Subjective:     Patient ID: Obinna Richards is a 43 y o  female  [facility] Clinical Progress Notes      Specialized Services Documentation  Therapist must complete separate progress note for each specific clinical activity in which the individual participated during the day  Allied Therapy   This group was facilitated virtually in a private office using HIPAA Compliant and Approved Microsoft Teams  Obinna Richards consented to the use of tele-video modality of treatment  [address]  actively shared in [facility] group focused on motivation  Engaged in [address] discussion and tasks exploring definition of, challenges to and ways to improve motivation  Group explored CBT techniques to counteract limiting beliefs and set self up for success  Joseluis Cruz identified how her thinking gets her stuck  Exercise wrote out ways to begin to challenge limiting beliefs  Some progress toward goals  Continue AT to explore wellness tools and encourage active practice         Tx Plan Objective: 1 1,1 2, Therapist:  Keren BERGMAN

## 2022-02-25 NOTE — PSYCH
Subjective:     Patient ID: Yehuda Bryson is a 43 y o  female  [facility] Clinical Progress Notes      Specialized Services Documentation  Therapist must complete separate progress note for each specific clinical activity in which the individual participated during the day  GROUP PSYCHOTHERAPY ([id number]) Group was facilitated virtually in a private office using HIPAA Compliant and Approved Microsoft Teams  Yehuda Bryson consented to the use of tele-video modality of treatment and was virtually present for group psychotherapy today  The group engaged in open discussions that allowed for conversations on the following topics; personal disclosures, reviewing emotions versus feelings and boundary setting and how to remember skills in crisis situations  Pollojon Magnus shared that she finds difficulty identifying emotions when individual experiences are unique  She expressed that she will review and analyze her experiences  Victorina Agudelo engaged in different group discussions to encourage and support other members  Victorina Agudelo demonstrated progress towards goals and objectives through group participation  Continue with psychotherapy    [place] Plan Objectives: 1 1, 1 2, 1 4   Therapist: Denilson Pedro MA, LPC

## 2022-02-25 NOTE — PSYCH
Subjective:     Patient ID: Laila Cho is a 43 y o  female  [facility] Clinical Progress Notes      Specialized Services Documentation  Therapist must complete separate progress note for each specific clinical activity in which the individual participated during the day  Group Psychotherapy     This group was facilitated virtually in a private office using HIPAA Compliant and Approved Microsoft Teams  Laila Cho  consented to the use of tele-video modality of treatment  ([id number])  Laila Cho  actively shared in psychotherapy group exploring the weekly wellness assessment  Group explored successes and challenges in wellness areas throughout this past week - physical, social, vocational, spiritual, cognitive, and emotional   Good positive progress toward goal noted  Continue psychotherapy to encourage self-awareness and home practice of skills to support wellness  Treatment Plan Objective: 1 1, 1 2, 1 3, 1 4   Therapist: Abraham SHORE RN         Education Therapy   [id number] Laila Coh actively shared in morning assessment and goal review  Presented as Receptive related to readiness to learn  Laila Cho did complete goal from last treatment day identifying gaining responsibility and hope  did not present with any barriers to learning  [address] engaged throughout the treatment day  Was engaged in learning related to Illness, Medication, Aftercare, and Wellness Tools  Staff utilized Verbal, Written, A/V, and Demonstration teaching methods    Laila Carteroth shared area of learning and set a goal for outside of program to type notes from program       Tx Plan Objective: 1 1,1 2,1 4 Therapist:  Abraham SHORE RN     Case Management Note    Abraham SHORE RN    Current suicide risk : Low     A case management session is not scheduled today with Laila Cho ; additionally, they did not request a CM meeting  Next scheduled session is Monday 02/28/2022   Jackelyn emailed and will be in attendance for IOP Monday, Wednesday and Friday next week  Medications changes/added/denied? No    Treatment session number: 21/IOP 2    Individual Case Management Visit provided today? No    [facility] follow up physician's orders: Begin IOP on 02/23/2022   Dr Diana Pichardo

## 2022-02-28 ENCOUNTER — OFFICE VISIT (OUTPATIENT)
Dept: PSYCHOLOGY | Facility: CLINIC | Age: 37
End: 2022-02-28
Payer: COMMERCIAL

## 2022-02-28 DIAGNOSIS — F33.2 SEVERE EPISODE OF RECURRENT MAJOR DEPRESSIVE DISORDER, WITHOUT PSYCHOTIC FEATURES (HCC): Primary | ICD-10-CM

## 2022-02-28 DIAGNOSIS — F41.1 GENERALIZED ANXIETY DISORDER: ICD-10-CM

## 2022-02-28 PROCEDURE — S9480 INTENSIVE OUTPATIENT PSYCHIA: HCPCS

## 2022-02-28 NOTE — PSYCH
Subjective:     Patient ID: Laila Cho is a 43 y o  female  [facility] Clinical Progress Notes      Specialized Services Documentation  Therapist must complete separate progress note for each specific clinical activity in which the individual participated during the day  GROUP PSYCHOTHERAPY ([id number]) Group was facilitated virtually in a private office using HIPAA Compliant and Approved [employer] Teams  Pepper Hernandez consented to the use of tele-video modality of treatment and was virtually present for group psychotherapy today  The group engaged in education about the vicious cycle of mental health disorders  The worksheet discusses how a condition leads to symptoms, which then leads to compensatory behaviors, which then leads to reactions  Each member was asked to share what their cycle looks like and how their behaviors worsen their mental illness  Pepper Hernandez seemed very engaged throughout the group session and was willing to ask questions and provide feedback to peers  Pepper Hernandez stated that when they struggle with their mental health symptoms, their compensatory behavior is over-eating and engaging in self-loathing  Pepper Hernandez is encouraged to make progress towards goals and objectives through group participation  They will continue to attend psychotherapy group       Tx plan objective: 1 1, 1 2   Therapist: Donte Bob MA

## 2022-02-28 NOTE — PSYCH
Subjective:     Patient ID: Judy Thakkar is a 43 y o  female  [facility] Clinical Progress Notes      Specialized Services Documentation  Therapist must complete separate progress note for each specific clinical activity in which the individual participated during the day  Group Psychotherapy Life Skills ([id number]) Judy Thakkar actively engaged in group focused on coping skills which was facilitated virtually in a private office using HIPAA Compliant and Approved [employer] Teams  Starr Uribe consented to the use of tele-video modality of treatment and was virtually present for group psychotherapy today  During group we created a list of coping skills from [employer] which patients can utilize when they are feeling stressed  Patients generated a list on the shared word document of coping strategies and were given a copy of the A-Z coping strategies list that they created  Starramerica Burciagas identified that a coping skill they would like to use is the STOP technique  Starramerica Uribe continues to make progress towards goals through verbal participation in group; to accomplish long term goals continue to utilize skills learned in programming  Continue with psychotherapy to educate and encourage use of wellness tools   Tx Plan Objective: 1 1,1 2 Therapist: Harpreet Saucedo

## 2022-02-28 NOTE — PSYCH
Virtual Regular Visit    Verification of patient location:    Patient is located in the following state in which I hold an active license [place]      Assessment/Plan:    Problem List Items Addressed This Visit        Other    Severe episode of recurrent major depressive disorder, without psychotic features ([facility] [address] ) - Primary    Generalized anxiety disorder          Goals addressed in session:      Reason for visit is PHP VIRTUAL GROUP DUE TO COVID-19      Encounter provider [address] PARTIAL [address]    Provider located at [address] [place] [phone]      Recent Visits  Date Type Provider Dept   02/25/22 Office Visit [address] PARTIAL PSYCH GROUP THERAPY [facility]   02/23/22 Office Visit [address] PARTIAL PSYCH GROUP THERAPY [facility]   02/22/22 Office Visit [address] PARTIAL PSYCH GROUP THERAPY [facility]   02/21/22 Office Visit [address] PARTIAL PSYCH GROUP THERAPY [facility]   Showing recent visits within past 7 days and meeting all other requirements  Today's Visits  Date Type Provider Dept   02/28/22 Office Visit [address] PARTIAL PSYCH GROUP THERAPY [facility]   Showing today's visits and meeting all other requirements  Future Appointments  No visits were found meeting these conditions  Showing future appointments within next 150 days and meeting all other requirements       The patient was identified by name and date of birth  Chris Lobo was informed that this is a telemedicine visit and that the visit is being conducted throughMicrosoft Teams and patient was informed that this is a secure, HIPAA-compliant platform  She agrees to proceed     My office door was closed  No one else was in the room  She acknowledged consent and understanding of privacy and security of the video platform   The patient has agreed to participate and understands they can discontinue the visit at any time     Patient is aware this is a billable service  Subjective  Jackelyn Hay is a 43 y o  female    HPI     Past Medical History:   Diagnosis Date    Anxiety     Borderline personality disorder ([facility] [address] )     Depression     Psychiatric illness     Self-injurious behavior     Suicide attempt ([facility] )        No past surgical history on file  Current Outpatient Medications   Medication Sig Dispense Refill    albuterol (PROVENTIL HFA,VENTOLIN HFA) 90 mcg/act inhaler Inhale 2 puffs every 4 (four) hours as needed for wheezing (Patient not taking: Reported on 1/27/2022 ) 8 g 0    busPIRone (BUSPAR) 30 MG tablet Take 1 tablet (30 mg total) by mouth 2 (two) times a day 60 tablet 1    cyanocobalamin (VITAMIN B-12) 100 mcg tablet Take 1 tablet by mouth every 24 hours      escitalopram (LEXAPRO) 20 mg tablet Take 1 tablet (20 mg total) by mouth daily 30 tablet 1    QUEtiapine (SEROquel XR) 150 mg 24 hr tablet Take 1 tablet (150 mg total) by mouth daily at bedtime 30 tablet 1     No current facility-administered medications for this visit  Allergies   Allergen Reactions    Ceclor [Cefaclor] Hives    Nuts - Food Allergy GI Intolerance    Latex Rash       Review of Systems    Video Exam    There were no vitals filed for this visit  Physical Exam     I spent FOUR GROUP HOURS PLUS CASE MANAGEMENT minutes with patient today in which greater than 50% of the time was spent in counseling/coordination of care regarding PHP - SEE NOTES  VIRTUAL VISIT DISCLAIMER    Philomena Hill verbally agrees to participate in [employer]  Pt is aware that [employer] could be limited without vital signs or the ability to perform a full hands-on physical exam  Jackelyn Hay understands she or the provider may request at any time to terminate the video visit and request the patient to seek care or treatment in person

## 2022-02-28 NOTE — PSYCH
Subjective:     Patient ID: Rajesh Castro is a 43 y o  female  [facility] Clinical Progress Notes      Specialized Services Documentation  Therapist must complete separate progress note for each specific clinical activity in which the individual participated during the day  Group Psychotherapy  This group was facilitated virtually in a private office using HIPAA Compliant and Approved [employer] Teams  Rajesh Castro consented to the use of tele-video modality of treatment  ([id number])  Rajesh Castro actively shared in psychoeducational group which focused on Tapping skills- Emotional Freedom Technique (EFT) as a coping skill  How Tapping can help, where Tapping comes from, why Tapping works and how to [address] were discussed with the group  The group discussed benefits of Tapping  The group was then guided through a Tapping scenario  The group wrapped up by making a goal to be more positive  Good progress towards goal noted  Continue psychoeducation to educate on the use of Tapping as a healthy coping skill to improve mental wellbeing  Tx  Plan Objectives: [de-identified]   Therapist: Felisa SHORE RN      Education Therapy   [id number] Rajesh Castro actively shared in morning assessment and goal review  Presented as Receptive related to readiness to learn  Rajesh Castro did complete goal from last treatment day identifying gaining responsibility and hope  did not present with any barriers to learning  [address] engaged throughout the treatment day  Was engaged in learning related to Illness, Medication, Aftercare, and Wellness Tools  Staff utilized Verbal, Written, A/V, and Demonstration teaching methods  Fausto Channel JOHNSON shared area of learning and set a goal for outside of program to call insurance company        Tx Plan Objective: 1 1,1 2,1 4 Therapist:  Felisa SHORE RN    Case Management Note    Felisa SHORE RN    Current suicide risk : medium    [id number] Met with Edgar Ramirez  Reported she was struggling today with being overwhelmed and frustrated  Reports having a good weekend until last evening when she had a disagreement with her  regarding the amount of coping and wellness skills she uses throughout the day  Nicholas Rodney noted people ask if I did specific things to help myself and I take it as them being critical  Nicholas Rodney stated of being frustrated in her attempts to find a therapist/group who will help her learn DBT  Nicholas Rodney stated many of the places I call will not take commercial insurance  Nicholas Rodney encouraged to make therapist appointment with the therapist she was established with  Nicholas Rodney stated, "she doesn't feel she can provide beneficial therapy to me but I will call her to see if I can get an appointment until I can find someone who can do DBT  "Nicholas Rodney stated " I don't see the point to this, I don't have [place] in life and not sure I want to be here, it takes too much effort  I have suicidal thoughts  I'm going to call the warm line "  Nicholas Rodney denies plan for suicide  Support provided and attempted to redirect  At 1213, notified Mehrdad Rawls of Nicholas Rodney having thoughts of suicide, no plan  Beatriz Hope will go home from work to be with Nicholas Rodney has county crisis numbers if needed and will go to ED if SI becomes worse  Barriers continue  in that Nicholas Rodney is frustrated with amount of time she needs to put into being well and finding a provider/group to do DBT training with her  Nicholas Rodney is aware of her med check date and time for this week  [address]  [facility] was in afternoon group that runs from 7031-9703, was present for wrap up/goal setting and participated while in group  Medications changes/added/denied? No    Treatment session number: 22/3 IOP    Individual Case Management Visit provided today?  yes    [facility] follow up physician's orders: No new orders

## 2022-03-01 ENCOUNTER — APPOINTMENT (OUTPATIENT)
Dept: PSYCHOLOGY | Facility: CLINIC | Age: 37
End: 2022-03-01
Payer: COMMERCIAL

## 2022-03-01 ENCOUNTER — DOCUMENTATION (OUTPATIENT)
Dept: PSYCHOLOGY | Facility: CLINIC | Age: 37
End: 2022-03-01

## 2022-03-01 NOTE — PROGRESS NOTES
Subjective:     Patient ID: Tiffanie Castillo is a 43 y o  female  [facility] Clinical Progress Notes      Specialized Services Documentation  Therapist must complete separate progress note for each specific clinical activity in which the individual participated during the day  Case Management Note    Francisco Allen RN, BSN    Current suicide risk : Moderate    Valencia Lepe JOHNSON excused due to IOP status  This writer called Robert Scott today who reported her  is at home with her for support and they will make phone calls to set up outpatient therapy appointments  Today, Jackelyn sounds brighter however continues with SI but denies plan  Additional therapist options emailed to Robert Scott  Medications changes/added/denied? No    Treatment session number: N/A    Individual Case Management Visit provided today?  Yes     [facility]ations follow up physician's orders: No new orders

## 2022-03-02 ENCOUNTER — TELEMEDICINE (OUTPATIENT)
Dept: PSYCHIATRY | Facility: CLINIC | Age: 37
End: 2022-03-02
Payer: COMMERCIAL

## 2022-03-02 ENCOUNTER — OFFICE VISIT (OUTPATIENT)
Dept: PSYCHOLOGY | Facility: CLINIC | Age: 37
End: 2022-03-02
Payer: COMMERCIAL

## 2022-03-02 DIAGNOSIS — F33.2 SEVERE EPISODE OF RECURRENT MAJOR DEPRESSIVE DISORDER, WITHOUT PSYCHOTIC FEATURES (HCC): Primary | ICD-10-CM

## 2022-03-02 DIAGNOSIS — G47.00 INSOMNIA, UNSPECIFIED TYPE: ICD-10-CM

## 2022-03-02 DIAGNOSIS — F41.1 GENERALIZED ANXIETY DISORDER: Primary | ICD-10-CM

## 2022-03-02 DIAGNOSIS — F41.1 GENERALIZED ANXIETY DISORDER: ICD-10-CM

## 2022-03-02 DIAGNOSIS — F32.81 PMDD (PREMENSTRUAL DYSPHORIC DISORDER): ICD-10-CM

## 2022-03-02 DIAGNOSIS — F33.2 SEVERE EPISODE OF RECURRENT MAJOR DEPRESSIVE DISORDER, WITHOUT PSYCHOTIC FEATURES (HCC): ICD-10-CM

## 2022-03-02 PROCEDURE — S9480 INTENSIVE OUTPATIENT PSYCHIA: HCPCS

## 2022-03-02 PROCEDURE — 99213 OFFICE O/P EST LOW 20 MIN: CPT | Performed by: PHYSICIAN ASSISTANT

## 2022-03-02 NOTE — PSYCH
Subjective:     Patient ID: Rajesh Castro is a 43 y o  female  [facility] Clinical Progress Notes      Specialized Services Documentation  Therapist must complete separate progress note for each specific clinical activity in which the individual participated during the day  Allied Therapy   This group was facilitated virtually in a private office using HIPAA Compliant and Approved [employer] Teams  Rajesh Castro consented to the use of tele-video modality of treatment  7096-0037 Rajesh Castro actively  shared in [facility] group focused on self-love  Group explored personal strengths through song writing exercise referencing recovery model key facets of recovery  Ernie Felder identified her empathy as a strength  The group engaged in [address] meditation - learning the skill and then participating  Discussion reinforced tangible ways to be kind to self and work on personal compassion  During discussion, Kleindio Felder stated "I don't mean to be a [employer] but I hate myself"  Group reinforced that this is why we are exploring "TANGIBLE" ways to address this core issue  Inconsistent work toward goal noted  Continue allied therapy to encourage self awareness and strategies to support wellness          Tx Plan Objective: 1 1,1 5, Therapist:  Yasmine BERGMAN

## 2022-03-02 NOTE — PSYCH
Subjective:     Patient ID: Obinna Richards is a 43 y o  female  [facility] Clinical Progress Notes      Specialized Services Documentation  Therapist must complete separate progress note for each specific clinical activity in which the individual participated during the day  GROUP PSYCHOTHERAPY ([id number]) Group was facilitated virtually in a private office using HIPAA Compliant and Approved Microsoft Teams  Joseluis Cruz consented to the use of tele-video modality of treatment and was virtually present for group psychotherapy today  The group received education about the importance of goal setting  We discussed the negative emotions that come along with long to-do lists and talked about how difficult responsibilities often lead to stress and anxiety  Members had the opportunity to process the challenges they face when attempting to complete tasks and discussed tips on the worksheet  Using the Goal Breakdown worksheet, clients learned how to break their goals into smaller and more manageable tasks  Joseluis Cruz seemed fairly engaged throughout the group session and presented with a flat affect  Joseluis Cruz stated that one goal they can break down and work on is making calls to OP providers  Joseluis Cruz is encouraged to make progress towards goals and objectives through group participation   They will continue to attend psychotherapy group    [facility]: 1 1, 1 2, 1 4   Therapist: Jeanne Boland MA

## 2022-03-02 NOTE — PSYCH
Virtual Regular Visit    Verification of patient location:    Patient is located in the following state in which I hold an active license [place]      Assessment/Plan:    Problem List Items Addressed This Visit        Other    Severe episode of recurrent major depressive disorder, without psychotic features ([address] )    Generalized anxiety disorder - Primary    Insomnia          Goals addressed in session:           Reason for visit is PHP VIRTUAL GROUP DUE TO COVID-19      Encounter provider [address] PARTIAL [address]    Provider located at [address] [place] [id number]      Recent Visits  Date Type Provider Dept   02/28/22 Office Visit [address] PARTIAL PSYCH GROUP THERAPY [facility]   02/25/22 Office Visit [address] PARTIAL PSYCH GROUP THERAPY [facility]   02/23/22 Office Visit [address] PARTIAL PSYCH GROUP THERAPY [facility]   Showing recent visits within past 7 days and meeting all other requirements  Today's Visits  Date Type Provider Dept   03/02/22 Office Visit [address] PARTIAL PSYCH GROUP THERAPY [facility]   Showing today's visits and meeting all other requirements  Future Appointments  No visits were found meeting these conditions  Showing future appointments within next 150 days and meeting all other requirements       The patient was identified by name and date of birth  Gigi Jallohaudra was informed that this is a telemedicine visit and that the visit is being conducted throughMicrosoft Teams and patient was informed that this is a secure, HIPAA-compliant platform  She agrees to proceed     My office door was closed  No one else was in the room  She acknowledged consent and understanding of privacy and security of the video platform  The patient has agreed to participate and understands they can discontinue the visit at any time  Patient is aware this is a billable service  Subjective  Jackelyn Magana is a 43 y o  female   HPI     Past Medical History:   Diagnosis Date    Anxiety     Borderline [facility] disorder [facility] )     Depression     Psychiatric illness     Self-injurious behavior     Suicide attempt ([facility] )        No past surgical history on file  Current Outpatient Medications   Medication Sig Dispense Refill    albuterol (PROVENTIL HFA,VENTOLIN HFA) 90 mcg/act inhaler Inhale 2 puffs every 4 (four) hours as needed for wheezing (Patient not taking: Reported on 1/27/2022 ) 8 g 0    busPIRone (BUSPAR) 30 MG tablet Take 1 tablet (30 mg total) by mouth 2 (two) times a day 60 tablet 1    cyanocobalamin (VITAMIN B-12) 100 mcg tablet Take 1 tablet by mouth every 24 hours      escitalopram (LEXAPRO) 20 mg tablet Take 1 tablet (20 mg total) by mouth daily 30 tablet 1    QUEtiapine (SEROquel XR) 150 mg 24 hr tablet Take 1 tablet (150 mg total) by mouth daily at bedtime 30 tablet 1     No current facility-administered medications for this visit  Allergies   Allergen Reactions    Ceclor [Cefaclor] Hives    Nuts - Food Allergy GI Intolerance    Latex Rash       Review of Systems    Video Exam    There were no vitals filed for this visit  Physical Exam     I spent FOUR GROUP HOURS PLUS CASE MANAGEMENT minutes with patient today in which greater than 50% of the time was spent in counseling/coordination of care regarding PHP - SEE NOTES  VIRTUAL VISIT DISCLAIMER    Freddietucker Velez verbally agrees to participate in [employer]  Pt is aware that [employer] could be limited without vital signs or the ability to perform a full hands-on physical exam  Jackelyn Magana understands she or the provider may request at any time to terminate the video visit and request the patient to seek care or treatment in person

## 2022-03-02 NOTE — PSYCH
Virtual Regular Visit    Verification of patient location:    Patient is located in the following state in which I hold an active license [place]             Reason for visit is   Chief Complaint   Patient presents with    Virtual Regular Visit        Encounter provider Oneal Chan PA-C    Provider located at [address] [id number] [phone]      Recent Visits  Date Type Provider Dept   02/23/22 Telemedicine Oneal Chan PA-C [facility] recent visits within past 7 days and meeting all other requirements  Future Appointments  No visits were found meeting these conditions  Showing future appointments within next 150 days and meeting all other requirements       The patient was identified by name and date of birth  Mina Funk was informed that this is a telemedicine visit and that the visit is being conducted throughMicrosoft Teams and patient was informed that this is a secure, HIPAA-compliant platform  She agrees to proceed     My office door was closed  No one else was in the room  She acknowledged consent and understanding of privacy and security of the video platform  The patient has agreed to participate and understands they can discontinue the visit at any time  Patient is aware this is a billable service  VIRTUAL VISIT DISCLAIMER    Mina Funk verbally agrees to participate in [employer]  Pt is aware that [employer] could be limited without vital signs or the ability to perform a full hands-on physical exam  Jackelyn Waite understands she or the provider may request at any time to terminate the video visit and request the patient to seek care or treatment in person            Progress Note - Behavioral Health   [facility]  Marci JOHNSON 43 y o  female MRN: [id number]     Progress at partial program: improving  [place] seen by Min 2/23 at which time meds unchanged  Is currently in pre-menses and has experienced SI past couple of days  Has thought about plan but no intent- successfully using coping tools- " distract rather then enact"  Is in IOP now  Tolerating meds    Still searching for OP ind therapist      ROS:   As above otherwise negative    Active Ambulatory Problems     Diagnosis Date Noted    Severe episode of recurrent major depressive disorder, without psychotic features ([place] [address] ) 12/07/2016    Generalized anxiety disorder 12/07/2016    Candida rash of groin 11/04/2021    Borderline personality disorder ([facility] )     PMDD (premenstrual dysphoric disorder) 11/18/2021    Insomnia 12/10/2021     Resolved Ambulatory Problems     Diagnosis Date Noted    Medical clearance for psychiatric admission 11/04/2021    Bronchitis 11/04/2021     Past Medical History:   Diagnosis Date    Anxiety     Depression     Psychiatric illness     Self-injurious behavior     Suicide attempt ([place] [address] )      Allergies   Allergen Reactions    Ceclor [Cefaclor] Hives    Nuts - Food Allergy GI Intolerance    Latex Rash          Mental Status Evaluation:    Appearance:  age appropriate, adequate grooming   Behavior:  cooperative   Speech:  normal rate and volume   Mood:  low   Affect:  mood-congruent   Thought Process:  goal directed   Associations: intact associations   Thought Content:  no overt delusions   Perceptual Disturbances: none   Risk Potential: Suicidal ideation - None at present  Homicidal ideation - None   Sensorium:  oriented to person, place and time/date   Memory:  recent and remote memory grossly intact   Consciousness:  alert and awake   Attention: attention span and concentration are age appropriate   Insight:  improving   Judgment: improving   Gait/Station: unable to assess   Motor Activity: unable to assess today due to virtual visit       Laboratory results: I have personally reviewed all pertinent laboratory/tests results  Assessment   Diagnoses and all orders for this visit:    Severe episode of recurrent major depressive disorder, without psychotic features (HCC)    Generalized anxiety disorder    PMDD (premenstrual dysphoric disorder)       Current Outpatient Medications   Medication Sig Dispense Refill    albuterol (PROVENTIL HFA,VENTOLIN HFA) 90 mcg/act inhaler Inhale 2 puffs every 4 (four) hours as needed for wheezing (Patient not taking: Reported on 1/27/2022 ) 8 g 0    busPIRone (BUSPAR) 30 MG tablet Take 1 tablet (30 mg total) by mouth 2 (two) times a day 60 tablet 1    cyanocobalamin (VITAMIN B-12) 100 mcg tablet Take 1 tablet by mouth every 24 hours      escitalopram (LEXAPRO) 20 mg tablet Take 1 tablet (20 mg total) by mouth daily 30 tablet 1    QUEtiapine (SEROquel XR) 150 mg 24 hr tablet Take 1 tablet (150 mg total) by mouth daily at bedtime 30 tablet 1     No current facility-administered medications for this visit  Plan    Planned medication and treatment changes:  Cont IOP  No med change- seroquel xr 150 mg q bedtime, buspar 30 mg bid , lexapro 20 mg/d    All current active medications have been reviewed  Continue treatment with group therapy and partial program      Risks / Benefits of Treatment:    Risks, benefits, and possible side effects of medications explained to patient and patient verbalizes understanding and agrees to medications  Counseling / Coordination of Care:    Patient's progress discussed with staff in treatment team meeting  Medications, treatment progress and treatment plan reviewed with patient      Aleena Ruiz PA-C 03/02/22

## 2022-03-02 NOTE — PSYCH
Subjective:     Patient ID: Anna Bee is a 43 y o  female  [facility] Clinical Progress Notes      Specialized Services Documentation  Therapist must complete separate progress note for each specific clinical activity in which the individual participated during the day  Group Psychotherapy  This group was facilitated virtually in a private office using HIPAA Compliant and Approved [employer] Teams  nAna Bee consented to the use of tele-video modality of treatment  ([id number]) Anna eBe actively shared in psychotherapy group exploring the benefits of exercise to support mental wellness  Group explored healthy exercises, barriers to exercise, strategies to overcome barriers, and Qigong exercise for beginners  Good progress toward goal noted  Continue psychotherapy to encourage self-awareness and home practice of skills to support wellness  Treatment Plan Objective: 1 1, 1 2   Therapist: Amber SHORE RN       Education Therapy   [id number] Anna Bee actively shared in morning assessment and goal review  Presented as Receptive related to readiness to learn  Anna Bee did complete goal from last treatment day identifying gaining responsibility and support  did not present with any barriers to learning  [address] engaged throughout the treatment day  Was engaged in learning related to Illness, Medication, Aftercare, and Wellness Tools  Staff utilized Verbal, Written, A/V, and Demonstration teaching methods  Anna eBe shared area of learning and set a goal for outside of program to take a shower tonight and practice self care  Tx Plan Objective: 1 1,1 2,1 4 Therapist:  Amber SHORE RN      Case Management Note  This case management session was facilitated virtually in a private office using HIPAA Compliant and Approved [employer] Teams    Anna Bee consented to the use of tele-video modality of treatment  Pura Gottron, RN, BSN    Current suicide risk : Low     [id number] Met with Chris Lobo  Reported she is feeling better today having less SI, no plan  Gopal Gonzalez has a support system in place and safety plan in case SI increases/intensifies  Gopal Gonzalez is able implement effective coping skills when her thoughts increase  Gopal Gonzalez and her  are intently searching for aftercare therapist  She acknowledged she would like to discharge next Wednesday  Today, Gopal Gonzalez was more bright in her affect and was neatly groomed  Medications changes/added/denied? No    Treatment session number: 02/7 IOP    Individual Case Management Visit provided today?  Yes     [facility] follow up physician's orders: No new orders

## 2022-03-03 ENCOUNTER — DOCUMENTATION (OUTPATIENT)
Dept: PSYCHOLOGY | Facility: CLINIC | Age: 37
End: 2022-03-03

## 2022-03-03 ENCOUNTER — APPOINTMENT (OUTPATIENT)
Dept: PSYCHOLOGY | Facility: CLINIC | Age: 37
End: 2022-03-03
Payer: COMMERCIAL

## 2022-03-03 NOTE — PROGRESS NOTES
Subjective:     Patient ID: Travis Lilly is a 43 y o  female  [facility] Clinical Progress Notes      Specialized Services Documentation  Therapist must complete separate progress note for each specific clinical activity in which the individual participated during the day  Travis Lilly excused due to IOP status       Tyler SHORE RN

## 2022-03-04 ENCOUNTER — OFFICE VISIT (OUTPATIENT)
Dept: PSYCHOLOGY | Facility: CLINIC | Age: 37
End: 2022-03-04
Payer: COMMERCIAL

## 2022-03-04 DIAGNOSIS — F33.2 SEVERE EPISODE OF RECURRENT MAJOR DEPRESSIVE DISORDER, WITHOUT PSYCHOTIC FEATURES (HCC): ICD-10-CM

## 2022-03-04 DIAGNOSIS — G47.00 INSOMNIA, UNSPECIFIED TYPE: ICD-10-CM

## 2022-03-04 DIAGNOSIS — F41.1 GENERALIZED ANXIETY DISORDER: Primary | ICD-10-CM

## 2022-03-04 PROCEDURE — S9480 INTENSIVE OUTPATIENT PSYCHIA: HCPCS

## 2022-03-04 NOTE — PSYCH
Assessment/Plan:      Diagnoses and all orders for this visit:    Generalized anxiety disorder    Severe episode of recurrent major depressive disorder, without psychotic features ([facility] )    Insomnia, unspecified type          Subjective:     Patient ID: Mayte Glass is a 43 y o  female  [facility] Treatment Plan   AREAS OF NEED: Anxiety, depression, and insomnia as evidenced by excessive worrying, panic attacks, heaviness on her chest, headaches, hyperventilating, racing heart, shakiness, difficulty sleeping at nights, over eating unhealthy foods, little happiness from doing scrapbooking, and feeling hopeless  due to chronic mental health issues, job stressors, and family stressors  Date Initiated: 01/21/22     Strengths: "organized, good at scrap booking, creative"      LONG TERM GOAL:   Date Initiated: 01/21/22  1 0  I will demonstrate and share two improvements in my emotional regulation skills during attendance to program    Target Date: 02/18/2022  Completion Date:         SHORT TERM OBJECTIVES:      Date Initiated: 01/21/22  1 1 I will recognize when I'm feeling overwhelmed and use identified coping skills to help decrease anxiety  Revision Date: 02/02/2022 continue, 02/11/22 continue   Target Date: 02/02/2022  Completion Date: 02/22/22      Date Initiated: 01/21/22  1 2 I will schedule daily calming/relaxation skill (e g , applied relaxation, progressive muscle relaxation, cue controlled relaxation; mindful breathing; biofeedback) into my routine to manage my anxiety symptoms    Revision Date: 02/02/2022 continue, 02/11/22 continue  Target Date: 02/02/2022  Completion Date: 02/22/22      Date Initiated: 01/21/22  1 3 I will take medications as prescribed and share questions and concerns if arise     Revision Date: 02/02/2022 continue, 02/11/22 continue, 02/22/22 continue, 03/04/22 continue  Target Date: 02/02/2022  Completion Date:      Date Initiated: 01/21/22  1 4 I will identify 3 ways my supports can assist in my recovery and agree to staff/support contact as indicated     Revision Date: 02/02/2022 continue, 02/11/22 continue, 02/22/22 continue, 03/04/22 continue  Target Date: 02/02/2022  Completion Date:            7 DAY REVISION:     Date Initiated: 02/02/2022  1 5 I will spend 5-10 minutes daily to work on [employer] to support my recovery  Revision Date: 02/11/22  continue, 02/22/22 continue  Target Date: 02/14/2022  Completion Date: 03/04/22 completed     Date Initiated: 02/11/22   1 6 I will work on obtaining outpatient therapist through obtaining provider list and making contacts  Revision Date: 02/22/22 continue, 03/04/22 continue  Target Date: 02/23/2022  Completion Date:     Date Initiated: 02/22/22   1 7 I will continue to work on previous set goals to facilitate optimal level of wellness  Revision Date: 03/04/22 continue  Target Date: 03/04/2022  Completion date:     Date Initiated: 03/04/22   1 8  I will join a support group to facilitate continued wellness    Revision Date:  Target Date: 03/16/2022  Completion Date:         PSYCHIATRY:  Date Initiated:  01/21/22  Medication Management and Education       Revision Date: 02/02/2022 02/11/22 02/22/22 03/04/22       1 3 Continue medication management        The person(s) responsible for carrying out the plan Britney Jaeger MD; Ciara Hall PA-C     NURSING/SYMPTOM EDUCATION:   Date Initiated: 01/21/22  1 1, 1 2  1 3, 1 4 This RN/Or Therapist will provide wellness/symptoms and skill education groups three to five days weekly to educate Jackelyn Isaacs signs and symptoms of diagnoses, skills to manage, and medication questions that will be addressed by the treatment team     Revision date: 02/02/2022,  02/11/22 02/22/22 03/04/22   1 3,1 4,1 5, 1 6, 1 7, 1 8 Continue to encourage Kevan Gomez participate in wellness/symptoms and skills education groups daily to learn about symptoms, coping strategies and warning signs to promote relapse prevention  The person(s) responsible for carrying out the plan is Kena Pascual RN, BSN     PSYCHOLOGY:   Date Initiated: 01/21/22       1 1, 1 2, 1 4 Provide psychotherapy group 5 times per week to allow opportunity for Tyler Summers explore stressors and ways of coping  Revision Date: 02/02/2022,  02/11/22 02/22/22 03/04/22   1 4,1 5, 1 6, 1 7, 1 8  Continue to provide psychotherapy group daily to [address] [place] [address] encourage sharing of stressors, skills and positive change     The person(s) responsible for carrying out the plan is Lamar Loaiza MSW,LSW     ALLIED THERAPY:   Date Initiated: 01/21/22  1 1,1 2 Engage Jackelyn Machuca AT group 5 times weekly to encourage development and use of wellness tools to decrease symptoms and promote recovery through meaningful activity  Revision Date: 02/02/2022, 02/11/22 02/22/22 03/04/22   1 5, 1 6, 1 7, 1 8 Continue to engage Tyler Summers participate in AT group to practice wellness tools within program and transfer to home sharing successes and barriers through healthy task involvement         The person(s) responsible for carrying out the plan is PACHECO Martinez      CASE MANAGEMENT:   Date Initiated: 01/21/22      1 0 This  will meet with Marci Hardin [facility] times weekly to assess treatment progress, discharge planning, connection to community supports and UR as indicated  Revision Date: 02/02/2022, 02/11/22 02/22/22 03/04/22   1 0 Continue to meet with Jackelyn Dominguez times weekly to assess growth, work toward goals, continued treatment needs, dc planning and use of supports     The person(s) responsible for carrying out the plan is Joyce WHEELERN RN     TREATMENT REVIEW/COMMENTS:      DISCHARGE CRITERIA: Identify 3 signs of progress and complete relapse prevention plan     DISCHARGE PLAN: Connect with identified outpatient providers     Estimated Length of Stay: 10 treatment days       Diagnosis and Treatment Plan explained to Anice Ariel relates understanding diagnosis and is agreeable to Treatment Plan             CLIENT COMMENTS / Please share your thoughts, feelings, need and/or experiences regarding your treatment plan: _____________________________________________________________________________________________________________________________________________________________________________________________________________________________________________________________________________________________________________________ Date/Time: ______________      Patient Signature: _________________________________      Date/Time: ______________       Signature: _________________________________         Date/Time: ______________

## 2022-03-04 NOTE — PSYCH
Virtual Regular Visit    Verification of patient location:    Patient is located in the following state in which I hold an active license [place]      Assessment/Plan:    Problem List Items Addressed This Visit        Other    Severe episode of recurrent major depressive disorder, without psychotic features ([facility] [address] )    Generalized anxiety disorder - Primary    Insomnia          Goals addressed in session:           Reason for visit is PHP VIRTUAL GROUP DUE TO COVID-19      Encounter provider [facility] PARTIAL [address]    Provider located at [address] [place], [place] [phone]      Recent Visits  Date Type Provider Dept   03/02/22 Office Visit [facility] PARTIAL PSYCH GROUP THERAPY [facility]   02/28/22 Office Visit [facility] PARTIAL PSYCH GROUP THERAPY [facility]   02/25/22 Office Visit [facility] PARTIAL PSYCH GROUP THERAPY [facility]   Showing recent visits within past 7 days and meeting all other requirements  Today's Visits  Date Type Provider Dept   03/04/22 Office Visit [facility] PARTIAL PSYCH GROUP THERAPY [facility]   Showing today's visits and meeting all other requirements  Future Appointments  No visits were found meeting these conditions  Showing future appointments within next 150 days and meeting all other requirements       The patient was identified by name and date of birth  Micheline Sandoval was informed that this is a telemedicine visit and that the visit is being conducted throughMicrosoft Teams and patient was informed that this is a secure, HIPAA-compliant platform  She agrees to proceed     My office door was closed  No one else was in the room  She acknowledged consent and understanding of privacy and security of the video platform  The patient has agreed to participate and understands they can discontinue the visit at any time  Patient is aware this is a billable service  Subjective  Heather Darlene Homans is a 43 y o  female   HPI     Past Medical History:   Diagnosis Date    Anxiety     Borderline [facility] disorder ([facility] )     Depression     Psychiatric illness     Self-injurious behavior     Suicide attempt ([facility] )        No past surgical history on file  Current Outpatient Medications   Medication Sig Dispense Refill    albuterol (PROVENTIL HFA,VENTOLIN HFA) 90 mcg/act inhaler Inhale 2 puffs every 4 (four) hours as needed for wheezing (Patient not taking: Reported on 1/27/2022 ) 8 g 0    busPIRone (BUSPAR) 30 MG tablet Take 1 tablet (30 mg total) by mouth 2 (two) times a day 60 tablet 1    cyanocobalamin (VITAMIN B-12) 100 mcg tablet Take 1 tablet by mouth every 24 hours      escitalopram (LEXAPRO) 20 mg tablet Take 1 tablet (20 mg total) by mouth daily 30 tablet 1    QUEtiapine (SEROquel XR) 150 mg 24 hr tablet Take 1 tablet (150 mg total) by mouth daily at bedtime 30 tablet 1     No current facility-administered medications for this visit  Allergies   Allergen Reactions    Ceclor [Cefaclor] Hives    Nuts - Food Allergy GI Intolerance    Latex Rash       Review of Systems    Video Exam    There were no vitals filed for this visit  Physical Exam     I spent FOUR GROUP HOURS PLUS CASE MANAGEMENT minutes with patient today in which greater than 50% of the time was spent in counseling/coordination of care regarding PHP - SEE NOTES  VIRTUAL VISIT DISCLAIMER    Solmon Cranker verbally agrees to participate in [employer]  Pt is aware that [employer] could be limited without vital signs or the ability to perform a full hands-on physical exam  Heather Darlene Homans understands she or the provider may request at any time to terminate the video visit and request the patient to seek care or treatment in person

## 2022-03-04 NOTE — PSYCH
Subjective:     Patient ID: Lia Shipley is a 43 y o  female  [facility] Clinical Progress Notes      Specialized Services Documentation  Therapist must complete separate progress note for each specific clinical activity in which the individual participated during the day  Allied Therapy   This group was facilitated virtually in a private office using HIPAA Compliant and Approved Microsoft Teams  Lia Shipley consented to the use of tele-video modality of treatment  [id number] Lia Shipley  actively shared in [facility] group exploring DBT distress tolerance crisis survival strategies  Group explored crisis survival strategies ACCEPTS, SELF-SOOTHING, TIP, STOP, IMPROVE and PROS & CONS) reinforcing actions one could take to learn to tolerate stressful experiences, thoughts and urges  Versa Shallow felt she could put effort into practicing the object meditation introduced  Some progress toward goal noted  Continue AT to encourage learning, practice and home practice of skills to manage distress       Tx Plan Objective: 1 1,1 2, Therapist:  Cailin BERGMAN

## 2022-03-04 NOTE — PSYCH
Subjective:     Patient ID: Lauro Dakin is a 43 y o  female  [facility] Clinical Progress Notes      Specialized Services Documentation  Therapist must complete separate progress note for each specific clinical activity in which the individual participated during the day  Group Psychotherapy Life Skills ([id number]) Lauro Dakin actively engaged in group focused on learning about self-disclosure  Group was facilitated virtually in a private office using HIPAA Compliant and Approved Microsoft Teams  [facility] consented to the use of tele-video modality of treatment and was virtually present for group psychotherapy today  During group we discussed the following questions:  1  What are your concerns about self-disclosure? 2  What is appropriate to disclose? 3  What is inappropriate to disclose? 4  Why is self-disclosure important? Clients identified the ease and difficulties of self-disclosure  During group we did an activity to facilitate a self-disclosure  [facility] disclosed about addressing her mental health journey with work  [facility] continues to make progress towards goals through verbal participation in group; to accomplish long term goals continue to utilize skills learned in programming  Continue with psychotherapy to educate and encourage use of wellness tools  Tx Plan Objective: 1 1,1 2 Therapist: Ledy Arizmendi    Education Therapy   [id number] Lauro Dakin actively shared in morning assessment and goal review  Presented as Receptive related to readiness to learn  Lauro Dakin did not complete goal from last treatment day identifying hoping to gain education and advocacy   did not present with any barriers to learning  [address] engaged throughout the treatment day  Was engaged in learning related to Illness, Medication, Aftercare, and Wellness Tools  Staff utilized Verbal, Written, A/V, and Demonstration teaching methods    Dennie Patten ELIZABETH shared area of learning and set a goal for outside of program to look up string art        Tx Plan Objective: 1 1,1 2,1 4 Therapist:  Dorn Schaumann, MSW

## 2022-03-04 NOTE — PSYCH
Subjective:     Patient ID: Jeremy Fabian is a 43 y o  female  [facility] Clinical Progress Notes      Specialized Services Documentation  Therapist must complete separate progress note for each specific clinical activity in which the individual participated during the day  Group Psychotherapy     This group was facilitated virtually in a private office using HIPAA Compliant and Approved Microsoft Teams  Jeremy Fabian  consented to the use of tele-video modality of treatment  ([id number])  Jeremy Fabian  actively shared in psychotherapy group exploring the weekly wellness assessment  Group explored successes and challenges in wellness areas throughout this past week - physical, social, vocational, spiritual, cognitive, and emotional   Good positive progress toward goal noted  Continue psychotherapy to encourage self-awareness and home practice of skills to support wellness  Treatment Plan Objective: 1 1, 1 2, 1 3, 1 4   Therapist: Opal SHORE RN       Case Management Note    Opal SHORE RN    Current suicide risk : Low     [id number] Met with Jeremy Fabian  Reported she is feeling ok, optimistic, and  yesterday she had a good day  Progress noted in that yoseph is completing her goals and objectives, using positive affirmations and other coping skills that are effective  Continues to struggle with doing exercise  Bo Gutierrez reports she has been unable to obtain a therapist appointment but has calls out and awaiting return calls  Reports she has joined support group which begins on Monday March 7   Barriers continue to be finding a therapist for post discharge care  Reviewed treatment plan  Medications changes/added/denied? No    Treatment session number: 24/5 IOP    Individual Case Management Visit provided today?  Yes    [facility] follow up physician's orders: None at this time

## 2022-03-07 ENCOUNTER — OFFICE VISIT (OUTPATIENT)
Dept: PSYCHOLOGY | Facility: CLINIC | Age: 37
End: 2022-03-07
Payer: COMMERCIAL

## 2022-03-07 DIAGNOSIS — G47.00 INSOMNIA, UNSPECIFIED TYPE: ICD-10-CM

## 2022-03-07 DIAGNOSIS — F41.1 GENERALIZED ANXIETY DISORDER: Primary | ICD-10-CM

## 2022-03-07 DIAGNOSIS — F33.2 SEVERE EPISODE OF RECURRENT MAJOR DEPRESSIVE DISORDER, WITHOUT PSYCHOTIC FEATURES (HCC): ICD-10-CM

## 2022-03-07 PROCEDURE — S9480 INTENSIVE OUTPATIENT PSYCHIA: HCPCS

## 2022-03-07 NOTE — PSYCH
Subjective:     Patient ID: Aaron Shelton is a 43 y o  female  [facility] Clinical Progress Notes      Specialized Services Documentation  Therapist must complete separate progress note for each specific clinical activity in which the individual participated during the day  GROUP PSYCHOTHERAPY ([id number]) Group was facilitated virtually in a private office using HIPAA Compliant and Approved Microsoft Teams  Kiersten Carnes consented to the use of tele-video modality of treatment and was virtually present for group psychotherapy today  The group engaged in education about negative core beliefs  Clients participated actively in by disclosing their negative core belief and identified two positive characteristics against that core belief  Members were then asked the following questions:  1  What is one core belief you struggle with? 2  What have you learned about core beliefs? Kiersten Carnes was not present during this group  Kiersten Carnes will continue to attend psychotherapy group       Tx Plan objective: n/a  Therapist: Denita Alva MA

## 2022-03-07 NOTE — PSYCH
Subjective:     Patient ID: Kashmir Sharma is a 43 y o  female  [facility] Clinical Progress Notes      Specialized Services Documentation  Therapist must complete separate progress note for each specific clinical activity in which the individual participated during the day  Case Management Note    Joyce SHORE RN    Current suicide risk : Low     A case management session was scheduled today with Kashmir Sharma but she declined; additionally, they did not request a CM meeting  Next scheduled session is 03/09/2022  Medications changes/added/denied? No    Treatment session number: 25/6 IOP    Individual Case Management Visit provided today?  No    [facility] follow up physician's orders: None at this time

## 2022-03-07 NOTE — PSYCH
Subjective:     Patient ID: Mina Funk is a 43 y o  female  [facility] Clinical Progress Notes      Specialized Services Documentation  Therapist must complete separate progress note for each specific clinical activity in which the individual participated during the day  Allied Therapy   This group was facilitated virtually in a private office using HIPAA Compliant and Approved [employer] Teams  Mina Funk consented to the use of tele-video modality of treatment  [id number] Mina Funk  actively shared in music therapy group focused on mindfulness strategies - DBT How skills  Group introduced to and practiced the [facility] skills non-judgmental, one-mindfully, and effectiveness through various tasks  Group discussed ways to apply to slowing down to make more effective choices  Concha Carver identified she could practice mindfulness tonight through stretching  Some effort noted toward treatment goal   Continue AT to encourage the development and practice of mindfulness strategies to alleviate symptoms and support wellness        Tx Plan Objective: 1 1, Therapist:  Zeyad BERGMAN

## 2022-03-07 NOTE — PSYCH
Virtual Regular Visit    Verification of patient location:    Patient is located in the following state in which I hold an active license [place]      Assessment/Plan:    Problem List Items Addressed This Visit        Other    Severe episode of recurrent major depressive disorder, without psychotic features ([facility] [address] )    Generalized anxiety disorder - Primary    Insomnia          Goals addressed in session:           Reason for visit is PHP VIRTUAL GROUP DUE TO COVID-19      Encounter provider [facility] PARTIAL [address]    Provider located at [address] [place] [id number]      Recent Visits  Date Type Provider Dept   03/04/22 Office Visit [facility] PARTIAL PSYCH GROUP THERAPY [facility]   03/02/22 Office Visit [facility] PARTIAL PSYCH GROUP THERAPY [facility]   02/28/22 Office Visit [facility] PARTIAL PSYCH GROUP THERAPY [facility]   Showing recent visits within past 7 days and meeting all other requirements  Today's Visits  Date Type Provider Dept   03/07/22 Office Visit [facility] PARTIAL PSYCH GROUP THERAPY [facility]   Showing today's visits and meeting all other requirements  Future Appointments  No visits were found meeting these conditions  Showing future appointments within next 150 days and meeting all other requirements       The patient was identified by name and date of birth  Linn Frazier was informed that this is a telemedicine visit and that the visit is being conducted throughMicrosoft Teams and patient was informed that this is a secure, HIPAA-compliant platform  She agrees to proceed     My office door was closed  No one else was in the room  She acknowledged consent and understanding of privacy and security of the video platform  The patient has agreed to participate and understands they can discontinue the visit at any time  Patient is aware this is a billable service  Subjective  Jackelyn Krishnamurthy is a 43 y o  female   HPI     Past Medical History:   Diagnosis Date    Anxiety     Borderline personality disorder ([facility] [address] )     Depression     Psychiatric illness     Self-injurious behavior     [facility]        No past surgical history on file  Current Outpatient Medications   Medication Sig Dispense Refill    albuterol (PROVENTIL HFA,VENTOLIN HFA) 90 mcg/act inhaler Inhale 2 puffs every 4 (four) hours as needed for wheezing (Patient not taking: Reported on 1/27/2022 ) 8 g 0    busPIRone (BUSPAR) 30 MG tablet Take 1 tablet (30 mg total) by mouth 2 (two) times a day 60 tablet 1    cyanocobalamin (VITAMIN B-12) 100 mcg tablet Take 1 tablet by mouth every 24 hours      escitalopram (LEXAPRO) 20 mg tablet Take 1 tablet (20 mg total) by mouth daily 30 tablet 1    QUEtiapine (SEROquel XR) 150 mg 24 hr tablet Take 1 tablet (150 mg total) by mouth daily at bedtime 30 tablet 1     No current facility-administered medications for this visit  Allergies   Allergen Reactions    Ceclor [Cefaclor] Hives    Nuts - Food Allergy GI Intolerance    Latex Rash       Review of Systems    Video Exam    There were no vitals filed for this visit  Physical Exam     I spent FOUR GROUP HOURS PLUS CASE MANAGEMENT minutes with patient today in which greater than 50% of the time was spent in counseling/coordination of care regarding PHP - SEE NOTES  VIRTUAL VISIT DISCLAIMER    Lisa Knightells verbally agrees to participate in [employer]  Pt is aware that [employer] could be limited without vital signs or the ability to perform a full hands-on physical exam  Jcakelyn Krishnamurthy understands she or the provider may request at any time to terminate the video visit and request the patient to seek care or treatment in person

## 2022-03-08 ENCOUNTER — APPOINTMENT (OUTPATIENT)
Dept: PSYCHOLOGY | Facility: CLINIC | Age: 37
End: 2022-03-08
Payer: COMMERCIAL

## 2022-03-08 ENCOUNTER — DOCUMENTATION (OUTPATIENT)
Dept: PSYCHOLOGY | Facility: CLINIC | Age: 37
End: 2022-03-08

## 2022-03-08 DIAGNOSIS — G47.00 INSOMNIA, UNSPECIFIED TYPE: ICD-10-CM

## 2022-03-08 DIAGNOSIS — F41.1 GENERALIZED ANXIETY DISORDER: Primary | ICD-10-CM

## 2022-03-08 DIAGNOSIS — F33.2 SEVERE EPISODE OF RECURRENT MAJOR DEPRESSIVE DISORDER, WITHOUT PSYCHOTIC FEATURES (HCC): ICD-10-CM

## 2022-03-08 NOTE — PROGRESS NOTES
[facility] Discharge Instructions:     Disposition: home  [facility] [address] [facility] [address]       Diagnosis:    1  Generalized anxiety disorder     2  Severe episode of recurrent major depressive disorder, without psychotic features ([address] )     3  Insomnia, unspecified type          Allergies (Drug/Food): Allergies   Allergen Reactions    Ceclor [Cefaclor] Hives    Nuts - Food Allergy GI Intolerance    Latex Rash     Activity: no recommendations  Diet:no recommendations  Smoking Cessation: Offered  Diagnostic/Laboratory Orders: no labs or diagnostics ordered      Follow-up appointments/Referrals:     Medication Management:    Dr Joel Martell  [address]  [address] [facility] [address]  [place] [address] [facility]  [phone]  Appointment: 03/24/2022 at 1:30 pm     Outpatient Therapy:   Awaiting therapist appointment     Primary Care Physician:   Eleanor Gunter, [address] [facility]  [facility]  [address] [facility] [address] (e) [phone] (o) [phone]   Follow up as indicated       [facility]: [facility] [phone]  Crisis Intervention (Emergency) [employer]:  [place]: 1-505.808.4061  National Crisis Intervention Hotline: 6-308.848.6286  National Suicide Crisis Hotline: 2-920.283.5746  I, the undersigned, have received and understand the above instructions          Patient/Rep Signature: __________________________________       Date/Time: ______________         Relationship: __________________________________________       Date/Time: ______________         Physician Signature: ____________________________________      Date/Time: ______________               Signature: ________________________________       Date/Time: ______________

## 2022-03-08 NOTE — PROGRESS NOTES
Subjective:     Patient ID: Jose Freeman is a 43 y o  female  [facility] Clinical Progress Notes      Specialized Services Documentation  Therapist must complete separate progress note for each specific clinical activity in which the individual participated during the day  Case Management Note    Manisha Nance RN, BSN    Current suicide risk : Low     Jose Freeman excused due to IOP status

## 2022-03-09 ENCOUNTER — TELEMEDICINE (OUTPATIENT)
Dept: PSYCHIATRY | Facility: CLINIC | Age: 37
End: 2022-03-09
Payer: COMMERCIAL

## 2022-03-09 ENCOUNTER — OFFICE VISIT (OUTPATIENT)
Dept: PSYCHOLOGY | Facility: CLINIC | Age: 37
End: 2022-03-09
Payer: COMMERCIAL

## 2022-03-09 DIAGNOSIS — F32.81 PMDD (PREMENSTRUAL DYSPHORIC DISORDER): ICD-10-CM

## 2022-03-09 DIAGNOSIS — G47.00 INSOMNIA, UNSPECIFIED TYPE: ICD-10-CM

## 2022-03-09 DIAGNOSIS — F33.2 SEVERE EPISODE OF RECURRENT MAJOR DEPRESSIVE DISORDER, WITHOUT PSYCHOTIC FEATURES (HCC): ICD-10-CM

## 2022-03-09 DIAGNOSIS — F41.1 GENERALIZED ANXIETY DISORDER: ICD-10-CM

## 2022-03-09 DIAGNOSIS — F33.2 SEVERE EPISODE OF RECURRENT MAJOR DEPRESSIVE DISORDER, WITHOUT PSYCHOTIC FEATURES (HCC): Primary | ICD-10-CM

## 2022-03-09 DIAGNOSIS — F41.1 GENERALIZED ANXIETY DISORDER: Primary | ICD-10-CM

## 2022-03-09 PROCEDURE — 99213 OFFICE O/P EST LOW 20 MIN: CPT | Performed by: PHYSICIAN ASSISTANT

## 2022-03-09 PROCEDURE — S9480 INTENSIVE OUTPATIENT PSYCHIA: HCPCS

## 2022-03-09 NOTE — PSYCH
Assessment/Plan:      Diagnoses and all orders for this visit:    Generalized anxiety disorder    Severe episode of recurrent major depressive disorder, without psychotic features ([facility] [address] )    Insomnia, unspecified type          Subjective:     Patient ID: Zoë Jacob is a 43 y o  female  [facility] Treatment Plan   AREAS OF NEED: Anxiety, depression, and insomnia as evidenced by excessive worrying, panic attacks, heaviness on her chest, headaches, hyperventilating, racing heart, shakiness, difficulty sleeping at nights, over eating unhealthy foods, little happiness from doing scrapbooking, and feeling hopeless  due to chronic mental health issues, job stressors, and family stressors  Date Initiated: 01/21/22     Strengths: "organized, good at scrap booking, creative"      LONG TERM GOAL:   Date Initiated: 01/21/22  1 0  I will demonstrate and share two improvements in my emotional regulation skills during attendance to program    Target Date: 02/18/2022  Completion Date: 03/09/22 discharged        SHORT TERM OBJECTIVES:      Date Initiated: 01/21/22  1 1 I will recognize when I'm feeling overwhelmed and use identified coping skills to help decrease anxiety  Revision Date: 02/02/2022 continue, 02/11/22 continue   Target Date: 02/02/2022  Completion Date: 02/22/22      Date Initiated: 01/21/22  1 2 I will schedule daily calming/relaxation skill (e g , applied relaxation, progressive muscle relaxation, cue controlled relaxation; mindful breathing; biofeedback) into my routine to manage my anxiety symptoms    Revision Date: 02/02/2022 continue, 02/11/22 continue  Target Date: 02/02/2022  Completion Date: 02/22/22      Date Initiated: 01/21/22  1 3 I will take medications as prescribed and share questions and concerns if arise     Revision Date: 02/02/2022 continue, 02/11/22 continue, 02/22/22 continue, 03/04/22 continue,  03/09/22 discharged  Target Date: 02/02/2022  Completion Date:  03/09/22 discharged     Date Initiated: 01/21/22  1 4 I will identify 3 ways my supports can assist in my recovery and agree to staff/support contact as indicated     Revision Date: 02/02/2022 continue, 02/11/22 continue, 02/22/22 continue, 03/04/22 continue,  03/09/22 discharged  Target Date: 02/02/2022  Completion Date:  03/09/22 discharged            7 DAY REVISION:     Date Initiated: 02/02/2022  1 5 I will spend 5-10 minutes daily to work on [employer] to support my recovery  Revision Date: 02/11/22  continue, 02/22/22 continue  Target Date: 02/14/2022  Completion Date: 03/04/22 completed     Date Initiated: 02/11/22   1 6 I will work on obtaining outpatient therapist through obtaining provider list and making contacts  Revision Date: 02/22/22 continue, 03/04/22 continue,  03/09/22 discharged  Target Date: 02/23/2022  Completion Date:  03/09/22 discharged     Date Initiated: 02/22/22   1 7 I will continue to work on previous set goals to facilitate optimal level of wellness  Revision Date: 03/04/22 continue,  03/09/22 discharged  Target Date: 03/04/2022  Completion date:  03/09/22 discharged     Date Initiated: 03/04/22   1 8  I will join a support group to facilitate continued wellness    Revision Date:  03/09/22 discharged  Target Date: 03/16/2022  Completion Date:  03/09/22 discharged        PSYCHIATRY:  Date Initiated:  01/21/22  Medication Management and Education       Revision Date: 02/02/2022 02/11/22 02/22/22 03/04/22 03/09/22 discharged      1 3 Continue medication management        The person(s) responsible for carrying out the plan Jonathan Nam MD; Rober Hurt PA-C     NURSING/SYMPTOM EDUCATION:   Date Initiated: 01/21/22  1 1, 1 2  1 3, 1 4 This RN/Or Therapist will provide wellness/symptoms and skill education groups three to five days weekly to educate Jackelyn Robinsely signs and symptoms of diagnoses, skills to manage, and medication questions that will be addressed by the treatment team     Revision date: 02/02/2022,  02/11/22 02/22/22 03/04/22 03/09/22 discharged  1 3,1 4,1 5, 1 6, 1 7, 1 8 Continue to encourage Óscar Bergeron participate in wellness/symptoms and skills education groups daily to learn about symptoms, coping strategies and warning signs to promote relapse prevention  The person(s) responsible for carrying out the plan is Dany Anderson RN, BSN     PSYCHOLOGY:   Date Initiated: 01/21/22       1 1, 1 2, 1 4 Provide psychotherapy group 5 times per week to allow opportunity for Óscar Bergeron explore stressors and ways of coping  Revision Date: 02/02/2022,  02/11/22 02/22/22 03/04/22 03/09/22 discharged  1 4,1 5, 1 6, 1 7, 1 8  Continue to provide psychotherapy group daily to [address] [place] [address] encourage sharing of stressors, skills and positive change     The person(s) responsible for carrying out the plan is Neena Carcamo MSW,LSW     ALLIED THERAPY:   Date Initiated: 01/21/22  1 1,1 2 Engage [facility] 5 times weekly to encourage development and use of wellness tools to decrease symptoms and promote recovery through meaningful activity  Revision Date: 02/02/2022, 02/11/22 02/22/22 03/04/22 03/09/22 discharged  1 5, 1 6, 1 7, 1 8 Continue to engage Óscar Bergeron participate in AT group to practice wellness tools within program and transfer to home sharing successes and barriers through healthy task involvement         The person(s) responsible for carrying out the plan is PACHECO Kelley      CASE MANAGEMENT:   Date Initiated: 01/21/22      1 0 This  will meet with Adebayo Modi [facility] times weekly to assess treatment progress, discharge planning, connection to community supports and UR as indicated    Revision Date: 02/02/2022, 02/11/22 02/22/22 03/04/22 03/09/22 discharged  1 0 Continue to meet with Jackelyn Rae times weekly to assess growth, work toward goals, continued treatment needs, dc planning and use of supports     The person(s) responsible for carrying out the plan is Doris SHORE RN     TREATMENT REVIEW/COMMENTS:  03/09/22 discharged     DISCHARGE CRITERIA: Identify 3 signs of progress and complete relapse prevention plan     DISCHARGE PLAN: Connect with identified outpatient providers     Estimated Length of Stay: 10 treatment days

## 2022-03-09 NOTE — PSYCH
Subjective:     Patient ID: Edgar Ramirez is a 43 y o  female  [facility] Clinical Progress Notes      Specialized Services Documentation  Therapist must complete separate progress note for each specific clinical activity in which the individual participated during the day  Group Psychotherapy  This group was facilitated virtually in a private office using HIPAA Compliant and Approved [employer] Teams  Edgar Ramirez  consented to the use of tele-video modality of treatment  ([id number]) Edgar Ramirez actively shared in psychoeducational group which focused on nutrition to improve physical and mental wellbeing  Topics included appropriate serving sizes for all food groups as well as benefits to eating for health  They then discussed ideas on how to improve on their nutrition  Good progress toward goal noted  Continue psychoeducation to educate on the importance of making nutrition a priority  Tx Plan Objectives: 1 1,1 2    Therapist: Jimmy SHORE RN      Education Therapy   [id number] Edgar Ramirez actively shared in morning assessment and goal review  Presented as Receptive related to readiness to learn  Edgar Ramirez did complete goal from last treatment day identifying gaining responsibility, advocacy, support and education  did not present with any barriers to learning  [address] engaged throughout the treatment day  Was engaged in learning related to Illness, Medication, Aftercare, and Wellness Tools  Staff utilized Verbal, Written, A/V, and Demonstration teaching methods  Edgar Ramirez shared area of learning and set a goal for outside of program to type notes from today's groups  Nicholas Rodney reported she received a call back from therapist and appointment will be set       Tx Plan Objective: 1 1,1 2,1 4 Therapist:  Jimmy SHORE RN      Case Management Note    Jimmy SHORE RN    Current suicide risk : Low     ([id number]) CM reviewed Relapse Prevention Plan, discharge instructions, medication list and crisis information with Linn Frazier  See discharge summary for treatment details  Aftercare providers to receive discharge summary  Medications changes/added/denied? No    Treatment session number: 27/8 IOP    Individual Case Management Visit provided today? Yes     [facility] follow up physician's orders: Discharge to outpatient care   Dr Mylene Lei

## 2022-03-09 NOTE — PSYCH
Virtual Regular Visit    Verification of patient location:    Patient is located in the following state in which I hold an active license [place]                 Reason for visit is   Chief Complaint   Patient presents with    Virtual Regular Visit        Encounter provider Delia Henderson PA-C    Provider located at [address] [address] [phone]      Recent Visits  Date Type Provider Dept   03/02/22 Telemedicine Delia Henderson PA-C [facility] recent visits within past 7 days and meeting all other requirements  Future Appointments  No visits were found meeting these conditions  Showing future appointments within next 150 days and meeting all other requirements       The patient was identified by name and date of birth  Aaron Shelton was informed that this is a telemedicine visit and that the visit is being conducted throughMicrosoft Teams and patient was informed that this is a secure, HIPAA-compliant platform  She agrees to proceed     My office door was closed  No one else was in the room  She acknowledged consent and understanding of privacy and security of the video platform  The patient has agreed to participate and understands they can discontinue the visit at any time  Patient is aware this is a billable service  VIRTUAL VISIT DISCLAIMER    Aaron Shelton verbally agrees to participate in [employer]  Pt is aware that [employer] could be limited without vital signs or the ability to perform a full hands-on physical exam  Jackelyn Dickens understands she or the provider may request at any time to terminate the video visit and request the patient to seek care or treatment in person          Progress Note - Behavioral Health   [facility]  Mylene JOHNSON 43 y o  female MRN: [id number]     Progress at partial program: improved  Ishan Galindo seen by Min 3/2 at which time meds unchanged  Ishan Galindo feels ready for discharge  Denies side effects to meds and feels she is continuing to benefit from them  Depression has lessened  Ishan Galindo still gets SI at times, usually with identifiable precipitants, and has been very successful usnig coping tools to manage these thoughts       ROS:   As above otherwise negative    Active Ambulatory Problems     Diagnosis Date Noted    Severe episode of recurrent major depressive disorder, without psychotic features ([place] [address] ) 12/07/2016    Generalized anxiety disorder 12/07/2016    Candida rash of groin 11/04/2021    Borderline personality disorder ([facility] )     PMDD (premenstrual dysphoric disorder) 11/18/2021    Insomnia 12/10/2021     Resolved Ambulatory Problems     Diagnosis Date Noted    Medical clearance for psychiatric admission 11/04/2021    Bronchitis 11/04/2021     Past Medical History:   Diagnosis Date    Anxiety     Depression     Psychiatric illness     Self-injurious behavior     Suicide attempt ([place] [address] )        Allergies   Allergen Reactions    Ceclor [Cefaclor] Hives    Nuts - Food Allergy GI Intolerance    Latex Rash          Mental Status Evaluation:    Appearance:  age appropriate, adequate grooming   Behavior:  pleasant, cooperative   Speech:  normal rate and volume   Mood:  improved   Affect:  brighter   Thought Process:  goal directed   Associations: intact associations   Thought Content:  normal   Perceptual Disturbances: none   Risk Potential: Suicidal ideation - None at present  Homicidal ideation - None   Sensorium:  oriented to person, place and time/date   Memory:  recent and remote memory grossly intact   Consciousness:  alert and awake   Attention: attention span and concentration are age appropriate   Insight:  improved   Judgment: intact   Gait/Station: unable to assess   Motor Activity: unable to assess today due to virtual visit       Laboratory results: I have personally reviewed all pertinent laboratory/tests results  Assessment   Diagnoses and all orders for this visit:    Severe episode of recurrent major depressive disorder, without psychotic features (HCC)    PMDD (premenstrual dysphoric disorder)    Generalized anxiety disorder      Current Outpatient Medications   Medication Sig Dispense Refill    albuterol (PROVENTIL HFA,VENTOLIN HFA) 90 mcg/act inhaler Inhale 2 puffs every 4 (four) hours as needed for wheezing (Patient not taking: Reported on 1/27/2022 ) 8 g 0    busPIRone (BUSPAR) 30 MG tablet Take 1 tablet (30 mg total) by mouth 2 (two) times a day 60 tablet 1    cyanocobalamin (VITAMIN B-12) 100 mcg tablet Take 1 tablet by mouth every 24 hours      escitalopram (LEXAPRO) 20 mg tablet Take 1 tablet (20 mg total) by mouth daily 30 tablet 1    QUEtiapine (SEROquel XR) 150 mg 24 hr tablet Take 1 tablet (150 mg total) by mouth daily at bedtime 30 tablet 1     No current facility-administered medications for this visit  Plan    Planned medication and treatment changes:   Much improved with PHP/IOP  Umair Sol for discharge  Has appt with Dr Jack Given March 24  Cont meds unchanged- Seroquel xr 150 mg q bedtime, buspar 30 mg bid and lexapro 20 mg/d  Psychiatric meds reconciled  All current active medications have been reviewed  Discharge planning      Risks / Benefits of Treatment:    Risks, benefits, and possible side effects of medications explained to patient and patient verbalizes understanding and agrees to medications  Counseling / Coordination of Care:    Patient's progress discussed with staff in treatment team meeting  Medications, treatment progress and treatment plan reviewed with patient      Bernie Sage PA-C 03/09/22

## 2022-03-09 NOTE — PSYCH
Virtual Regular Visit    Verification of patient location:    Patient is located in the following state in which I hold an active license [place]      Assessment/Plan:    Problem List Items Addressed This Visit        Other    Severe episode of recurrent major depressive disorder, without psychotic features ([facility] )    Generalized anxiety disorder - Primary    Insomnia          Goals addressed in session:           Reason for visit is PHP VIRTUAL GROUP DUE TO COVID-19      Encounter provider [address] PARTIAL [address]    Provider located at [address] [place] [id number]      Recent Visits  Date Type Provider Dept   03/07/22 Office Visit [address] PARTIAL PSYCH GROUP THERAPY [facility]   03/04/22 Office Visit [address] PARTIAL PSYCH GROUP THERAPY [facility]   03/02/22 Office Visit [address] PARTIAL PSYCH GROUP THERAPY [facility]   Showing recent visits within past 7 days and meeting all other requirements  Today's Visits  Date Type Provider Dept   03/09/22 Office Visit [address] PARTIAL PSYCH GROUP THERAPY [facility]   Showing today's visits and meeting all other requirements  Future Appointments  No visits were found meeting these conditions  Showing future appointments within next 150 days and meeting all other requirements       The patient was identified by name and date of birth  Jeremy Fabian was informed that this is a telemedicine visit and that the visit is being conducted throughMicrosoft Teams and patient was informed that this is a secure, HIPAA-compliant platform  She agrees to proceed     My office door was closed  No one else was in the room  She acknowledged consent and understanding of privacy and security of the video platform  The patient has agreed to participate and understands they can discontinue the visit at any time  Patient is aware this is a billable service  Subjective  Jackelyn Goldman is a 43 y o  female   HPI     Past Medical History:   Diagnosis Date    Anxiety     Borderline personality disorder ([facility] [address] )     Depression     Psychiatric illness     Self-injurious behavior     Suicide attempt ([facility] )        No past surgical history on file  Current Outpatient Medications   Medication Sig Dispense Refill    albuterol (PROVENTIL HFA,VENTOLIN HFA) 90 mcg/act inhaler Inhale 2 puffs every 4 (four) hours as needed for wheezing (Patient not taking: Reported on 1/27/2022 ) 8 g 0    busPIRone (BUSPAR) 30 MG tablet Take 1 tablet (30 mg total) by mouth 2 (two) times a day 60 tablet 1    cyanocobalamin (VITAMIN B-12) 100 mcg tablet Take 1 tablet by mouth every 24 hours      escitalopram (LEXAPRO) 20 mg tablet Take 1 tablet (20 mg total) by mouth daily 30 tablet 1    QUEtiapine (SEROquel XR) 150 mg 24 hr tablet Take 1 tablet (150 mg total) by mouth daily at bedtime 30 tablet 1     No current facility-administered medications for this visit  Allergies   Allergen Reactions    Ceclor [Cefaclor] Hives    Nuts - Food Allergy GI Intolerance    Latex Rash       Review of Systems    Video Exam    There were no vitals filed for this visit  Physical Exam     I spent FOUR GROUP HOURS PLUS CASE MANAGEMENT minutes with patient today in which greater than 50% of the time was spent in counseling/coordination of care regarding PHP - SEE NOTES  VIRTUAL VISIT DISCLAIMER    Chris Lobo verbally agrees to participate in [employer]  Pt is aware that [employer] could be limited without vital signs or the ability to perform a full hands-on physical exam  Jackelyn Goldman understands she or the provider may request at any time to terminate the video visit and request the patient to seek care or treatment in person

## 2022-03-09 NOTE — PSYCH
Subjective:     Patient ID: Wilfredo Shea is a 43 y o  female  [facility] Clinical Progress Notes      Specialized Services Documentation  Therapist must complete separate progress note for each specific clinical activity in which the individual participated during the day  Allied Therapy   Group was facilitated virtually in a private office using HIPAA Compliant and Approved [employer] Teams  Wilfredo Shea consented to the use of tele-video modality of treatment and was virtually present for group psychotherapy today  [id number]--  Wilfredo Shea participated in [facility] group focused on the use of music mindfulness and grounding strategies to regulate thoughts and emotions  Wilfredo Shea engaged in  music listening, practicing techniques, as well as group discussion  Group explored practice of various mindfulness strategies with active music listening to explore how to ground themselves when experiencing intense emotions  Group utilized sensory mindfulness techniques (rainbow, 5 countdown technique, draw your breath, etc ), rhythmic grounding, and listening to music to identify emotions and the body sensations that occur  Emanuel Dials stated she would use the rhythmic grounding strategies when sitting at her computer  She stated that she frequently feels overwhelmed when she sits down at her computer and forgets what tasks she needs to complete and believes by using rhythmic grounding it can help reset her  Some effort noted toward treatment goal  Continue AT to encourage the development and practice of utilizing mindfulness techniques to alleviate symptoms and support wellness    Tx Plan Objective: 1 1,  1 2, & 1 4      Therapist:  Aron Aase, MT-BC

## 2022-03-09 NOTE — PSYCH
Subjective:     Patient ID: Laila Cho is a 43 y o  female  [facility] Clinical Progress Notes      Specialized Services Documentation  Therapist must complete separate progress note for each specific clinical activity in which the individual participated during the day  Group Psychotherapy   This group was facilitated virtually in a private office using HIPAA Compliant and Approved [employer] Teams  Laila Cho consented to the use of tele-video modality of treatment  [id number] Laila Cho actively shared in psychotherapy group focused on self- care  Pepper Hernandez was encouraged to identify aspects of self-care and barriers to it  The concept of self -care versus selfishness explored  Group reinforced the necessity of self -care in wellness versus seeing this as a luxury and tips to increase self-care - a stretching exercise introduced and practiced  When asked at end of a specific thing she could commit to in order to increase self-care, she stated take time to do a hobby  Some progress voiced toward goal   Discharge at the end of the treatment day     Tx Plan Objective: 1 1,1 2, Therapist:  Siena BERGMAN

## 2022-03-10 ENCOUNTER — DOCUMENTATION (OUTPATIENT)
Dept: PSYCHOLOGY | Facility: CLINIC | Age: 37
End: 2022-03-10

## 2022-03-10 ENCOUNTER — APPOINTMENT (OUTPATIENT)
Dept: PSYCHOLOGY | Facility: CLINIC | Age: 37
End: 2022-03-10
Payer: COMMERCIAL

## 2022-03-10 DIAGNOSIS — F41.1 GENERALIZED ANXIETY DISORDER: Primary | ICD-10-CM

## 2022-03-10 DIAGNOSIS — G47.00 INSOMNIA, UNSPECIFIED TYPE: ICD-10-CM

## 2022-03-10 DIAGNOSIS — F33.2 SEVERE EPISODE OF RECURRENT MAJOR DEPRESSIVE DISORDER, WITHOUT PSYCHOTIC FEATURES (HCC): ICD-10-CM
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
## 2022-03-11 ENCOUNTER — APPOINTMENT (OUTPATIENT)
Dept: PSYCHOLOGY | Facility: CLINIC | Age: 37
End: 2022-03-11
Payer: COMMERCIAL

## 2022-03-24 ENCOUNTER — OFFICE VISIT (OUTPATIENT)
Dept: PSYCHIATRY | Facility: CLINIC | Age: 37
End: 2022-03-24
Payer: COMMERCIAL

## 2022-03-24 DIAGNOSIS — F41.1 GENERALIZED ANXIETY DISORDER: ICD-10-CM

## 2022-03-24 DIAGNOSIS — F33.2 SEVERE EPISODE OF RECURRENT MAJOR DEPRESSIVE DISORDER, WITHOUT PSYCHOTIC FEATURES (HCC): ICD-10-CM

## 2022-03-24 DIAGNOSIS — F60.3 BORDERLINE PERSONALITY DISORDER (HCC): Primary | ICD-10-CM

## 2022-03-24 PROCEDURE — 99213 OFFICE O/P EST LOW 20 MIN: CPT | Performed by: PSYCHIATRY & NEUROLOGY

## 2022-03-24 RX ORDER — ESCITALOPRAM OXALATE 20 MG/1
20 TABLET ORAL DAILY
Qty: 30 TABLET | Refills: 1 | Status: SHIPPED | OUTPATIENT
Start: 2022-03-24 | End: 2022-03-24 | Stop reason: SDUPTHER

## 2022-03-24 RX ORDER — BUSPIRONE HYDROCHLORIDE 30 MG/1
30 TABLET ORAL 2 TIMES DAILY
Qty: 60 TABLET | Refills: 1 | Status: SHIPPED | OUTPATIENT
Start: 2022-03-24 | End: 2022-05-27 | Stop reason: SDUPTHER

## 2022-03-24 RX ORDER — ESCITALOPRAM OXALATE 20 MG/1
20 TABLET ORAL DAILY
Qty: 30 TABLET | Refills: 1 | Status: SHIPPED | OUTPATIENT
Start: 2022-03-24 | End: 2022-05-27 | Stop reason: SDUPTHER

## 2022-03-24 RX ORDER — QUETIAPINE 150 MG/1
150 TABLET, FILM COATED, EXTENDED RELEASE ORAL
Qty: 30 TABLET | Refills: 1 | Status: SHIPPED | OUTPATIENT
Start: 2022-03-24 | End: 2022-05-02 | Stop reason: SDUPTHER
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
## 2022-05-02 ENCOUNTER — TELEPHONE (OUTPATIENT)
Dept: PSYCHIATRY | Facility: CLINIC | Age: 37
End: 2022-05-02

## 2022-05-02 DIAGNOSIS — F33.2 SEVERE EPISODE OF RECURRENT MAJOR DEPRESSIVE DISORDER, WITHOUT PSYCHOTIC FEATURES (HCC): ICD-10-CM

## 2022-05-02 RX ORDER — QUETIAPINE FUMARATE 50 MG/1
TABLET, EXTENDED RELEASE ORAL
Qty: 10 TABLET | Refills: 0 | Status: SHIPPED | OUTPATIENT
Start: 2022-05-02 | End: 2022-05-27

## 2022-05-02 NOTE — PROGRESS NOTES
The patient called today and complained of gaining significant weight on Seroquel XR along with feeling tired all the time and wanted to be discontinued on it  The patient was advised to decrease the Seroquel XR from 150 milligram daily to Seroquel XR 50 milligram 2 tablets by mouth daily for next 3 days and then to decrease to Seroquel XR 50 milligram 1 tablet for next 4 days and then to discontinue  She was advised to call us if there is any concern

## 2022-05-02 NOTE — TELEPHONE ENCOUNTER
Returned Jackelyn's call  Jackelyn's main concern is the weight gain  States she has gained like 40 pounds and that is contributing to her depression  Also states that she is always tired on the Seroquel  Does not feel the medication has really helped her much  She would like to come off of it  Explained that Dr Kassie Fisher will send new scripts to pharmacy so she can take 100 and 50 MG doses as discussed and then stop  Will refer to Dr Kassie Fisher for script

## 2022-05-02 NOTE — TELEPHONE ENCOUNTER
Called Jackelyn and informed her that Dr Troy Panda sent in script for Seroquel  Instructed her to take two 50 MG tablets at bedtime for 3 days then one 50 MG tablet at bedtime for 4 days and then stop

## 2022-05-02 NOTE — TELEPHONE ENCOUNTER
Mehran Knowles would like to wean off of seroquel  Says it's outweighing the positives, and she doesn't like the side effects and the thoughts have returned   Asked for a return call to instruct how to wean off of the medication

## 2022-05-04 ENCOUNTER — TELEPHONE (OUTPATIENT)
Dept: PSYCHIATRY | Facility: CLINIC | Age: 37
End: 2022-05-04

## 2022-05-27 ENCOUNTER — OFFICE VISIT (OUTPATIENT)
Dept: PSYCHIATRY | Facility: CLINIC | Age: 37
End: 2022-05-27
Payer: COMMERCIAL

## 2022-05-27 DIAGNOSIS — F33.2 SEVERE EPISODE OF RECURRENT MAJOR DEPRESSIVE DISORDER, WITHOUT PSYCHOTIC FEATURES (HCC): Primary | ICD-10-CM

## 2022-05-27 DIAGNOSIS — G47.00 INSOMNIA, UNSPECIFIED TYPE: ICD-10-CM

## 2022-05-27 DIAGNOSIS — F41.1 GENERALIZED ANXIETY DISORDER: ICD-10-CM

## 2022-05-27 PROCEDURE — 99214 OFFICE O/P EST MOD 30 MIN: CPT | Performed by: PSYCHIATRY & NEUROLOGY

## 2022-05-27 RX ORDER — QUETIAPINE FUMARATE 100 MG/1
TABLET, FILM COATED ORAL
Qty: 30 TABLET | Refills: 2 | Status: SHIPPED | OUTPATIENT
Start: 2022-05-27 | End: 2022-07-22

## 2022-05-27 RX ORDER — BUSPIRONE HYDROCHLORIDE 30 MG/1
30 TABLET ORAL 2 TIMES DAILY
Qty: 60 TABLET | Refills: 2 | Status: SHIPPED | OUTPATIENT
Start: 2022-05-27

## 2022-05-27 RX ORDER — ESCITALOPRAM OXALATE 20 MG/1
20 TABLET ORAL DAILY
Qty: 30 TABLET | Refills: 2 | Status: SHIPPED | OUTPATIENT
Start: 2022-05-27
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
## 2022-07-22 ENCOUNTER — TELEMEDICINE (OUTPATIENT)
Dept: PSYCHIATRY | Facility: CLINIC | Age: 37
End: 2022-07-22
Payer: COMMERCIAL

## 2022-07-22 DIAGNOSIS — F41.1 GENERALIZED ANXIETY DISORDER: ICD-10-CM

## 2022-07-22 DIAGNOSIS — F60.3 BORDERLINE PERSONALITY DISORDER (HCC): ICD-10-CM

## 2022-07-22 DIAGNOSIS — F33.2 SEVERE EPISODE OF RECURRENT MAJOR DEPRESSIVE DISORDER, WITHOUT PSYCHOTIC FEATURES (HCC): Primary | ICD-10-CM

## 2022-07-22 PROCEDURE — 90833 PSYTX W PT W E/M 30 MIN: CPT | Performed by: PSYCHIATRY & NEUROLOGY

## 2022-07-22 PROCEDURE — 99214 OFFICE O/P EST MOD 30 MIN: CPT | Performed by: PSYCHIATRY & NEUROLOGY

## 2022-07-22 RX ORDER — QUETIAPINE FUMARATE 50 MG/1
50 TABLET, EXTENDED RELEASE ORAL
Qty: 1 TABLET | Refills: 0 | Status: SHIPPED | OUTPATIENT
Start: 2022-07-22 | End: 2022-07-26

## 2022-07-22 RX ORDER — LORAZEPAM 0.5 MG/1
0.5 TABLET ORAL 2 TIMES DAILY PRN
Qty: 30 TABLET | Refills: 0 | Status: SHIPPED | OUTPATIENT
Start: 2022-07-22 | End: 2022-08-26 | Stop reason: SDUPTHER

## 2022-07-22 RX ORDER — QUETIAPINE 200 MG/1
200 TABLET, FILM COATED, EXTENDED RELEASE ORAL
Qty: 30 TABLET | Refills: 0 | Status: SHIPPED | OUTPATIENT
Start: 2022-07-24 | End: 2022-08-26 | Stop reason: SDUPTHER

## 2022-07-22 RX ORDER — QUETIAPINE 150 MG/1
150 TABLET, FILM COATED, EXTENDED RELEASE ORAL
Qty: 1 TABLET | Refills: 0 | Status: SHIPPED | OUTPATIENT
Start: 2022-07-23 | End: 2022-07-26 | Stop reason: DRUGHIGH

## 2022-07-22 NOTE — PSYCH
Virtual Regular Visit    Verification of patient location:    Patient is located in the following state in which I hold an active license [place]      Assessment/Plan:    Problem List Items Addressed This Visit        Other    Severe episode of recurrent major depressive disorder, without psychotic features ([facility] [address] [facility] ) - Primary    Relevant Medications    QUEtiapine (SEROquel XR) 200 mg 24 hr tablet (Start on 7/24/2022)    QUEtiapine (SEROquel XR) 50 mg    QUEtiapine (SEROquel XR) 150 mg 24 hr tablet (Start on 7/23/2022)    LORazepam (Ativan) 0 5 mg tablet    Generalized anxiety disorder    Relevant Medications    QUEtiapine (SEROquel XR) 200 mg 24 hr tablet (Start on 7/24/2022)    QUEtiapine (SEROquel XR) 50 mg    QUEtiapine (SEROquel XR) 150 mg 24 hr tablet (Start on 7/23/2022)    LORazepam (Ativan) 0 5 mg tablet    Borderline personality disorder (HCC)    Relevant Medications    QUEtiapine (SEROquel XR) 200 mg 24 hr tablet (Start on 7/24/2022)    QUEtiapine (SEROquel XR) 50 mg    QUEtiapine (SEROquel XR) 150 mg 24 hr tablet (Start on 7/23/2022)    LORazepam (Ativan) 0 5 mg tablet          Goals addressed in session: Goal 1 and Goal 2          Reason for visit is   Chief Complaint   Patient presents with    Virtual Regular Visit        Encounter provider iCpriano Castro MD    Provider located at [address] [address]      Recent Visits  No visits were found meeting these conditions  Showing recent visits within past 7 days and meeting all other requirements  Today's Visits  Date Type Provider Dept   07/22/22 Telemedicine MD Dany Yousif 70 today's visits and meeting all other requirements  Future Appointments  No visits were found meeting these conditions    Showing future appointments within next 150 days and meeting all other requirements       The patient was identified by name and date of birth  Jose Freeman was informed that this is a telemedicine visit and that the visit is being conducted through[employer] Embedded and patient was informed this is a secure, HIPAA-complaint platform  She agrees to proceed     My office door was closed  No one else was in the room  She acknowledged consent and understanding of privacy and security of the video platform  The patient has agreed to participate and understands they can discontinue the visit at any time  Patient is aware this is a billable service  Subjective  See below      HPI     Past Medical History:   Diagnosis Date    Anxiety     Borderline personality disorder ([facility] [address] )     Depression     Psychiatric illness     Self-injurious behavior     [facility]        No past surgical history on file  Current Outpatient Medications   Medication Sig Dispense Refill    LORazepam (Ativan) 0 5 mg tablet Take 1 tablet (0 5 mg total) by mouth 2 (two) times a day as needed for anxiety 30 tablet 0    [START ON 7/23/2022] QUEtiapine (SEROquel XR) 150 mg 24 hr tablet Take 1 tablet (150 mg total) by mouth daily at bedtime for 1 day 1 tablet 0    [START ON 7/24/2022] QUEtiapine (SEROquel XR) 200 mg 24 hr tablet Take 1 tablet (200 mg total) by mouth daily at bedtime 30 tablet 0    QUEtiapine (SEROquel XR) 50 mg Take 1 tablet (50 mg total) by mouth daily at bedtime for 1 day 1 tablet 0    albuterol (PROVENTIL HFA,VENTOLIN HFA) 90 mcg/act inhaler Inhale 2 puffs every 4 (four) hours as needed for wheezing (Patient not taking: Reported on 1/27/2022 ) 8 g 0    busPIRone (BUSPAR) 30 MG tablet Take 1 tablet (30 mg total) by mouth 2 (two) times a day 60 tablet 2    cyanocobalamin (VITAMIN B-12) 100 mcg tablet Take 1 tablet by mouth every 24 hours      escitalopram (LEXAPRO) 20 mg tablet Take 1 tablet (20 mg total) by mouth daily 30 tablet 2     No current facility-administered medications for this visit          Allergies Allergen Reactions    Ceclor [Cefaclor] Hives    Nuts - Food Allergy GI Intolerance    Latex Rash       Review of Systems    Video Exam    There were no vitals filed for this visit  Physical Exam     I spent 50 minutes directly with the patient during this visit start time 11:35 a m  end time 12:25 p m  MEDICATION MANAGEMENT NOTE        [facility] [place]      Name and Date of Birth:  Aston Huff  1985 MRN: [id number]    Date of Visit: July 22, 2022    Reason for Visit:  Follow-up for medication management  Subjective: The patient called in morning to reschedule appointment for today  The patient during the visit stated she has been very depressed for last few days  Reports major trigger has been start of her menstrual period  She also reported for last couple of days she has been feeling suicidal and was having plan of hanging herself  The patient though stated that she reach out to her supports including her friend and family which has helped her to not act on the thoughts  Her friend Wyneto Gualberto was at the home with the patient when I connected with the patient virtually for the appointment  He joined the patient after few minutes of the appointment  Patient's  also later came home to join the appointment with the patient and the friend  The patient also reported that she has been very anxious lately either for going to work or any other activity  She reports that also has been increasing her depression and subsequent suicidal thoughts  The patient reports she though has not acted on the thoughts or came close to acting on it  The patient though feels very depressed today  She reports she has been taking half a tablet of Seroquel IR which was started at last visit as she felt very groggy on it in the morning  The patient reports her energy level and motivation has been low  Reports concentration also has not been good  Denies though any panic attack but reports severe anxiety all the time  Denies any auditory or visual hallucination  Denies any self-harming behavior  Based on her depression and suicidal thoughts, I recommended the patient to go into psychiatric inpatient unit for few days to have her medication adjusted and for overall safety  Patient though was not very happy about that option  She reports she has a good support system and has also been following her safety plan  The patient as per her friend did reach out to him when she was feeling suicidal and he has been with the patient all day today  Her  came back from the work during my appointment after I initially suggested patient to go into the hospital   The patient friend Donte Bolandrenetta and her  Osmin Herrera both stated that they can be with the patient all the time and will make sure patient is taking her medications and also if there is any safety concern will bring her to the ER  Patient and her  were also concern about the patient previous experiences in the hospital specially if going during the weekend  They feel the medication changes are not done and feels it will be beneficial for the patient to stay at home for now  I still recommended considering psychiatric inpatient admission given the patient depression and suicidal thoughts but they stated that taking the responsibility and will keep but eye on her all the time and if there is any safety concern will bring her to the hospital   The patient also feels she will do better with the support system here at home compared to going into the hospital   She agreed for medication changes  I reviewed her past medication trials and other medication option again in detail and patient felt Seroquel XR was the most helpful medication and wants to go back on it  She verbalized understanding the risk of weight gain on it but at this time feels benefit is much more than the risk    The patient already is taking Lexapro and BuSpar and denies any side effects on it  The patient though requested for Ativan as needed basis for severe anxiety  Reports had been on that in the past and was very helpful  The patient denies any history of alcohol abuse or any substance use lately  Patient and her  also denied having any access to guns  The patient has been also agreed to keep the medication with him and giving to patient when it is due to decrease the risk of overdose  Patient also was open to considering transcranial magnetic stimulation treatment and I will send the referral today  She also was provided the other option of going to partial program again but the patient declined and stated due to her work schedule she will not be able to join partial at this time but will consider in the future if needed  Review Of Systems:      Constitutional feeling tired and low energy   ENT negative   Cardiovascular negative   Respiratory negative   Gastrointestinal negative   Genitourinary negative   Musculoskeletal negative   Integumentary negative   Neurological negative   Endocrine negative   Other Symptoms none       Alcohol/Substance Abuse:  Denies        Past Medical History:    Past Medical History:   Diagnosis Date    Anxiety     Borderline personality disorder ([facility] )     Depression     Psychiatric illness     Self-injurious behavior     Suicide attempt ([facility] )         No past surgical history on file    Allergies   Allergen Reactions    Ceclor [Cefaclor] Hives    Nuts - Food Allergy GI Intolerance    Latex Rash       Current Medications:       Current Outpatient Medications:     LORazepam (Ativan) 0 5 mg tablet, Take 1 tablet (0 5 mg total) by mouth 2 (two) times a day as needed for anxiety, Disp: 30 tablet, Rfl: 0    [START ON 7/23/2022] QUEtiapine (SEROquel XR) 150 mg 24 hr tablet, Take 1 tablet (150 mg total) by mouth daily at bedtime for 1 day, Disp: 1 tablet, Rfl: 0    [START ON 7/24/2022] QUEtiapine (SEROquel XR) 200 mg 24 hr tablet, Take 1 tablet (200 mg total) by mouth daily at bedtime, Disp: 30 tablet, Rfl: 0    QUEtiapine (SEROquel XR) 50 mg, Take 1 tablet (50 mg total) by mouth daily at bedtime for 1 day, Disp: 1 tablet, Rfl: 0    albuterol (PROVENTIL HFA,VENTOLIN HFA) 90 mcg/act inhaler, Inhale 2 puffs every 4 (four) hours as needed for wheezing (Patient not taking: Reported on 1/27/2022 ), Disp: 8 g, Rfl: 0    busPIRone (BUSPAR) 30 MG tablet, Take 1 tablet (30 mg total) by mouth 2 (two) times a day, Disp: 60 tablet, Rfl: 2    cyanocobalamin (VITAMIN B-12) 100 mcg tablet, Take 1 tablet by mouth every 24 hours, Disp: , Rfl:     escitalopram (LEXAPRO) 20 mg tablet, Take 1 tablet (20 mg total) by mouth daily, Disp: 30 tablet, Rfl: 2       History Review: The following portions of the patient's history were reviewed and updated as appropriate: allergies, current medications, past family history, past medical history, past social history, past surgical history and problem list          OBJECTIVE:     Vital signs in last 24 hours: There were no vitals filed for this visit      Mental Status Evaluation:    Appearance age appropriate, casually dressed   Behavior cooperative, calm   Speech normal rate, normal volume, normal pitch   Mood depressed, anxious   Affect constricted   Thought Processes organized, goal directed   Associations intact associations   Thought Content no overt delusions   Perceptual Disturbances: no auditory hallucinations, no visual hallucinations   Abnormal Thoughts  Risk Potential Suicidal ideation - Yes  Homicidal ideation - None  Potential for aggression - No   Orientation oriented to person, place, time/date and situation   Memory recent and remote memory grossly intact   Consciousness alert and awake   Attention Span Concentration Span attention span and concentration are age appropriate   Intellect appears to be of average intelligence   Insight intact   Judgement intact   Muscle Strength and  Gait unable to assess today due to virtual visit   Motor activity unable to assess today due to virtual visit   Language no difficulty naming common objects, no difficulty repeating a phrase   Fund of Knowledge adequate knowledge of current events  adequate fund of knowledge regarding past history  adequate fund of knowledge regarding vocabulary    Pain none   Pain Scale 0       Laboratory Results:   Most Recent Labs:   Lab Results   Component Value Date    WBC 8 79 11/04/2021    RBC 4 78 11/04/2021    HGB 13 7 11/04/2021    HCT 43 1 11/04/2021     11/04/2021    RDW 13 1 11/04/2021    NEUTROABS 4 41 11/04/2021    K 4 1 11/04/2021     11/04/2021    CO2 25 11/04/2021    BUN 15 11/04/2021    CREATININE 0 82 11/04/2021    GLUCOSE 109 12/21/2016    CALCIUM 8 9 11/04/2021    AST 15 11/04/2021    ALT 25 11/04/2021    ALKPHOS 66 11/04/2021    CHOLESTEROL 171 11/04/2021    TRIG 105 11/04/2021    HDL 44 11/04/2021    LDLCALC 106 (H) 11/04/2021    [place] 127 11/04/2021    ULP7AYJQFFAS 1 299 11/04/2021    FREET4 0 93 12/07/2016    T3FREE 2 75 12/07/2016    PREGSERUM Negative 12/07/2016    RPR Non-Reactive 11/04/2021     I have personally reviewed all pertinent laboratory/tests results  Assessment/Plan:  The patient struggling with significant depression and attributes to underlying menstrual period  The patient reports suicidal thoughts and was having plan to hang herself but reports she was able to reach out to her support system and did not acted on the thoughts  John Courser is present with the patient all day today  Her  also is there with the patient  Both joined the patient for the appointment  Given the patient having suicidal thoughts with plan my recommendation was to go into psychiatric inpatient for few days for medication management and safety  The patient expressed significant concern and feels she does not feel much support there and feels better at home  Patient though denies acting on the thoughts but given her active thoughts I still recommend considering inpatient  Her  and her friend stated they will take the responsibility and will be present with the patient all the time  They confirmed patient does not have access to the gun  Her  also agreed to keep the medication with her and will give the patient when it is due  The patient also feels she has been following her safety plan and when she was suicidal she reached out to her friend and family  She feels going into the hospital will be a punishment for her as she was following the safety plan recommended to her in the past   She though agreed that if her suicidal thoughts does not get better she will reach out to her friends/family or call 911or will go to the hospital   Her  also stated that he takes responsibility and will make sure if there is any behavior from the part of the patient which raises safety concern that he will bring her to the hospital   I am also making medication changes and will follow up with the patient again in 4 days  The patient also was advised that in case she changes her mind she can call crisis or 911  She was also made aware that I am working in psychiatric inpatient unit at [facility] during the weekend and if she gets admitted in same hospital, can update the doctor who is following with her or can see myself  The patient verbalized understanding agrees with the current plan  At the end of the meeting the patient felt much better with the plan discussed  Plan at this time is to discontinue Seroquel IR and restart patient on Seroquel XR starting with 50 mg 1 tablet in the evening for today and then increasing to 150 mg tomorrow and subsequently increasing to 200 mg from day after tomorrow for management of mood swings, depression, anxiety and sleep  I will continue with the Lexapro, BuSpar with no changes    I will also add Ativan 0 5 mg up to 2 times a day if needed for severe anxiety or panic attack  The patient, her  and friend was educated about her medication in detail including benefit, risk, side effects, alternative, contraindication, dosage and frequency  The patient verbalized understanding and agrees with the plan  She understands the risk of weight gain again on Seroquel but she also feels the benefit of the medication was much more than the risk  She also will follow-up with me next Tuesday which is in 4 days or sooner if needed  She was advised to call us if there is any concern during the working hours or to call crisis or 911 or visiting nearby ER in case of any emergency or suicidal thoughts or urges to hurt other  They agreed with the plan  Diagnoses and all orders for this visit:    Severe episode of recurrent major depressive disorder, without psychotic features (HCC)  -     QUEtiapine (SEROquel XR) 200 mg 24 hr tablet; Take 1 tablet (200 mg total) by mouth daily at bedtime  -     QUEtiapine (SEROquel XR) 50 mg; Take 1 tablet (50 mg total) by mouth daily at bedtime for 1 day  -     QUEtiapine (SEROquel XR) 150 mg 24 hr tablet; Take 1 tablet (150 mg total) by mouth daily at bedtime for 1 day    Borderline personality disorder (HCC)  -     QUEtiapine (SEROquel XR) 200 mg 24 hr tablet; Take 1 tablet (200 mg total) by mouth daily at bedtime  -     QUEtiapine (SEROquel XR) 50 mg; Take 1 tablet (50 mg total) by mouth daily at bedtime for 1 day  -     QUEtiapine (SEROquel XR) 150 mg 24 hr tablet; Take 1 tablet (150 mg total) by mouth daily at bedtime for 1 day    Generalized anxiety disorder  -     LORazepam (Ativan) 0 5 mg tablet;  Take 1 tablet (0 5 mg total) by mouth 2 (two) times a day as needed for anxiety          Treatment Recommendations/Precautions:      Aware of 24 hour and weekend coverage for urgent situations accessed by calling [facility] main practice number    Risks/Benefits      Risks, Benefits And Possible Side Effects Of Medications:    Risks, benefits, and possible side effects of medications explained to Patricio Benito and she verbalizes understanding and agreement for treatment  Risks of medications in pregnancy explained to Patricio Benito  She verbalizes understanding and agrees to notify her doctor if she becomes pregnant  Controlled Medication Discussion:     Not applicable    Psychotherapy Provided:     Individual psychotherapy provided: Counseling was provided during the session today for 35 minutes  Risk assessment, supportive psychotherapy, medication Education, collateral information from her  and friend and making safety plan  Medications, treatment progress and treatment plan reviewed with Patricio Benito  Medication changes discussed with Jackelyn  Medication education provided to Patricio Benito  Reassurance and supportive therapy provided  Crisis/safety plan discussed with Jackelyn  Treatment Plan;    Completed and signed during the session: Not applicable - Treatment Plan not due at this session    Imani Pineda MD 07/22/22    VIRTUAL VISIT DISCLAIMER    Zoë Jacob verbally agrees to participate in [employer]  Pt is aware that [employer] could be limited without vital signs or the ability to perform a full hands-on physical exam  Jackelyn Andersen understands she or the provider may request at any time to terminate the video visit and request the patient to seek care or treatment in person

## 2022-07-26 ENCOUNTER — TELEMEDICINE (OUTPATIENT)
Dept: PSYCHIATRY | Facility: CLINIC | Age: 37
End: 2022-07-26
Payer: COMMERCIAL

## 2022-07-26 DIAGNOSIS — F33.2 SEVERE EPISODE OF RECURRENT MAJOR DEPRESSIVE DISORDER, WITHOUT PSYCHOTIC FEATURES (HCC): ICD-10-CM

## 2022-07-26 DIAGNOSIS — F60.3 BORDERLINE PERSONALITY DISORDER (HCC): ICD-10-CM

## 2022-07-26 PROCEDURE — 99214 OFFICE O/P EST MOD 30 MIN: CPT | Performed by: PSYCHIATRY & NEUROLOGY
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
## 2022-07-26 NOTE — PSYCH
Virtual Regular Visit    Verification of patient location:    Patient is located in the following state in which I hold an active license [place]      Assessment/Plan:    Problem List Items Addressed This Visit        Other    Severe episode of recurrent major depressive disorder, without psychotic features ([facility] )    Borderline personality disorder ([facility] )          Goals addressed in session: Goal 1 and Goal 2          Reason for visit is   Chief Complaint   Patient presents with    Virtual Regular Visit        Encounter provider Eliot Bowden MD    Provider located at [address] [address]      Recent Visits  Date Type Provider Dept   07/22/22 Telemedicine MD Dany Snyder 72 recent visits within past 7 days and meeting all other requirements  Today's Visits  Date Type Provider Dept   07/26/22 Telemedicine MD Dany Snyder 70 today's visits and meeting all other requirements  Future Appointments  No visits were found meeting these conditions  Showing future appointments within next 150 days and meeting all other requirements       The patient was identified by name and date of birth  Tiffanie Castillo was informed that this is a telemedicine visit and that the visit is being conducted throughic Embedded and patient was informed this is a secure, HIPAA-complaint platform  She agrees to proceed     My office door was closed  No one else was in the room  She acknowledged consent and understanding of privacy and security of the video platform  The patient has agreed to participate and understands they can discontinue the visit at any time  Patient is aware this is a billable service       Subjective  See below      HPI     Past Medical History:   Diagnosis Date    Anxiety     Borderline personality disorder ([facility] )     Depression  Psychiatric illness     Self-injurious behavior     Suicide attempt ([facility] )        No past surgical history on file  Current Outpatient Medications   Medication Sig Dispense Refill    albuterol (PROVENTIL HFA,VENTOLIN HFA) 90 mcg/act inhaler Inhale 2 puffs every 4 (four) hours as needed for wheezing (Patient not taking: Reported on 1/27/2022 ) 8 g 0    busPIRone (BUSPAR) 30 MG tablet Take 1 tablet (30 mg total) by mouth 2 (two) times a day 60 tablet 2    cyanocobalamin (VITAMIN B-12) 100 mcg tablet Take 1 tablet by mouth every 24 hours      escitalopram (LEXAPRO) 20 mg tablet Take 1 tablet (20 mg total) by mouth daily 30 tablet 2    LORazepam (Ativan) 0 5 mg tablet Take 1 tablet (0 5 mg total) by mouth 2 (two) times a day as needed for anxiety 30 tablet 0    QUEtiapine (SEROquel XR) 200 mg 24 hr tablet Take 1 tablet (200 mg total) by mouth daily at bedtime 30 tablet 0     No current facility-administered medications for this visit  Allergies   Allergen Reactions    Ceclor [Cefaclor] Hives    Nuts - Food Allergy GI Intolerance    Latex Rash       Review of Systems    Video Exam    There were no vitals filed for this visit  Physical Exam     I spent 20 minutes directly with the patient during this visit start time 3:04 p m  end time 3:24 p m  MEDICATION MANAGEMENT NOTE        [facility]      Name and Date of Birth:  Zabrina Rivera  1985 MRN: [id number]    Date of Visit: July 26, 2022    Reason for Visit:  Follow-up for medication management  Subjective: The patient reports she feels much better for last couple of days  Reports she feels it could be due to medication change at last visit and also her period is over  She feels significant improvement in depression  Reports occasional fleeting thoughts of not living anymore still comes but she is able to cope with it and ignore it well    She reports she also had been watched by her friend and her  constantly over the weekend and felt having good support  She denies any significant anxiety nowadays  Reports sleep has been significantly better on Seroquel XR  Feels mood has been overall very stable  Denies any hopelessness  Denies any urges to hurt other  Reports appetite also has been better  Reports compliant with her psychiatric medication and denies any side effect  Denies any self-harming behavior  She reports her  also felt she has been doing much better over last couple of days  She reports her  just stepped out but did not had any concern about her  She reports he has been giving her medication as prescribed  She reports her energy level and motivation also has got better  Review Of Systems:      Constitutional negative   ENT negative   Cardiovascular negative   Respiratory negative   Gastrointestinal negative   Genitourinary negative   Musculoskeletal negative   Integumentary negative   Neurological negative   Endocrine negative   Other Symptoms none       Alcohol/Substance Abuse:  Denies        Past Medical History:    Past Medical History:   Diagnosis Date    Anxiety     [facility]     Depression     Psychiatric illness     Self-injurious behavior     Suicide attempt ([facility] )         No past surgical history on file    Allergies   Allergen Reactions    Ceclor [Cefaclor] Hives    Nuts - Food Allergy GI Intolerance    Latex Rash       Current Medications:       Current Outpatient Medications:     albuterol (PROVENTIL HFA,VENTOLIN HFA) 90 mcg/act inhaler, Inhale 2 puffs every 4 (four) hours as needed for wheezing (Patient not taking: Reported on 1/27/2022 ), Disp: 8 g, Rfl: 0    busPIRone (BUSPAR) 30 MG tablet, Take 1 tablet (30 mg total) by mouth 2 (two) times a day, Disp: 60 tablet, Rfl: 2    cyanocobalamin (VITAMIN B-12) 100 mcg tablet, Take 1 tablet by mouth every 24 hours, Disp: , Rfl:    escitalopram (LEXAPRO) 20 mg tablet, Take 1 tablet (20 mg total) by mouth daily, Disp: 30 tablet, Rfl: 2    LORazepam (Ativan) 0 5 mg tablet, Take 1 tablet (0 5 mg total) by mouth 2 (two) times a day as needed for anxiety, Disp: 30 tablet, Rfl: 0    QUEtiapine (SEROquel XR) 200 mg 24 hr tablet, Take 1 tablet (200 mg total) by mouth daily at bedtime, Disp: 30 tablet, Rfl: 0       History Review: The following portions of the patient's history were reviewed and updated as appropriate: allergies, current medications, past family history, past medical history, past social history, past surgical history and problem list          OBJECTIVE:     Vital signs in last 24 hours: There were no vitals filed for this visit      Mental Status Evaluation:    Appearance age appropriate, casually dressed   Behavior cooperative, calm   Speech normal rate, normal volume, normal pitch   Mood minimally anxious, minimally depressed   Affect brighter   Thought Processes organized, goal directed   Associations intact associations   Thought Content no overt delusions   Perceptual Disturbances: no auditory hallucinations, no visual hallucinations   Abnormal Thoughts  Risk Potential Suicidal ideation - None  Homicidal ideation - None  Potential for aggression - No   Orientation oriented to person, place, time/date and situation   Memory recent and remote memory grossly intact   Consciousness alert and awake   Attention Span Concentration Span attention span and concentration are age appropriate   Intellect appears to be of average intelligence   Insight intact   Judgement intact   Muscle Strength and  Gait unable to assess today due to virtual visit   Motor activity unable to assess today due to virtual visit   Language no difficulty naming common objects, no difficulty repeating a phrase   Fund of Knowledge adequate knowledge of current events  adequate fund of knowledge regarding past history  adequate fund of knowledge regarding vocabulary    Pain none   Pain Scale 0       Laboratory Results:   Most Recent Labs:   Lab Results   Component Value Date    WBC 8 79 11/04/2021    RBC 4 78 11/04/2021    HGB 13 7 11/04/2021    HCT 43 1 11/04/2021     11/04/2021    RDW 13 1 11/04/2021    NEUTROABS 4 41 11/04/2021    K 4 1 11/04/2021     11/04/2021    CO2 25 11/04/2021    BUN 15 11/04/2021    CREATININE 0 82 11/04/2021    GLUCOSE 109 12/21/2016    CALCIUM 8 9 11/04/2021    AST 15 11/04/2021    ALT 25 11/04/2021    ALKPHOS 66 11/04/2021    CHOLESTEROL 171 11/04/2021    TRIG 105 11/04/2021    HDL 44 11/04/2021    LDLCALC 106 (H) 11/04/2021    [place] 127 11/04/2021    KTR6SYHRONKU 1 299 11/04/2021    FREET4 0 93 12/07/2016    T3FREE 2 75 12/07/2016    PREGSERUM Negative 12/07/2016    RPR Non-Reactive 11/04/2021     I have personally reviewed all pertinent laboratory/tests results  Assessment/Plan:  The patient seems to be significantly better compared to last Friday  I believe in addition to medication her period being over now might have helped significantly as that was initial trigger for her worsening depression  Denies any active suicidal thoughts but occasional passive thoughts but reports able to cope with it well  Denies any intent or plan  Sleep and energy level also better with anxiety also significantly under control  Acute risk at this time the patient to harm self or others seems to be low given improvement in her depression over last couple of days  The chronic risk is still high compared to general population given her past psychiatric history  She though has very good support and agrees to seek help or reach out to her family or friend if she start feeling worsening depression or suicidal thoughts again  Plan at this time is to continue with her current medication with no changes    The patient also will be following with psychiatrist to have a consultation on [facility] later this week and then will decide if she wants to pursue not  The patient was advised that I believe it will be very helpful for her depression but in case she plans to not pursue it to call me in 2 weeks so that I can increase her dose of Seroquel XR to 300 mg before her next menstrual period  The patient verbalized understanding and agrees with the plan  At this time she will continue with no change  She will follow with me in 4 weeks or sooner if needed  She was advised to call us if there is any concern and to call crisis or visit nearby ER in case of any emergency or having SI or HI  Patient verbalized understanding agrees with the plan  Diagnoses and all orders for this visit:    Severe episode of recurrent major depressive disorder, without psychotic features ([facility] )    Borderline personality disorder ([facility] )          Treatment Recommendations/Precautions:      Aware of 24 hour and weekend coverage for urgent situations accessed by calling [facility] main practice number    Risks/Benefits      Risks, Benefits And Possible Side Effects Of Medications:    Risks, benefits, and possible side effects of medications explained to PAUL and she verbalizes understanding and agreement for treatment  Risks of medications in pregnancy explained to [facility]  She verbalizes understanding and agrees to notify her doctor if she becomes pregnant  Controlled Medication Discussion:     [facility] has been filling controlled prescriptions on time as prescribed according to [facility]  Discussed with [facility] the risks of sedation, respiratory depression, impairment of ability to drive and potential for abuse and addiction related to treatment with benzodiazepine medications   She understands risk of treatment with benzodiazepine medications, agrees to not drive if feels impaired and agrees to take medications as prescribed  Discussed with Lola [employer] warning on concurrent use of benzodiazepines and opioid medications including sedation, respiratory depression, coma and death  She understands the risk of treatment with benzodiazepines in addition to opioids and wants to continue taking those medications  Discussed with Geovani Cooney increased risk of impairment related to concurrent use of benzodiazepines and hypnotic medications including excessive sedation, psychomotor impairment and respiratory depression  She understands the risk of treatment with benzodiazepines in addition to hypnotic medications and wants to continue taking those medications    Psychotherapy Provided:     Individual psychotherapy provided: Counseling was provided during the session today for 10 minutes  Medications, treatment progress and treatment plan reviewed with Geovani Cooney  Medication education provided to Geovani Cooney  Reassurance and supportive therapy provided  Crisis/safety plan discussed with Jackelyn  Treatment Plan;    Completed and signed during the session: Not applicable - Treatment Plan not due at this session    Gwen Urbina MD 07/26/22    VIRTUAL VISIT DISCLAIMER    Malhotra Romina verbally agrees to participate in [employer]  Pt is aware that [employer] could be limited without vital signs or the ability to perform a full hands-on physical exam  Jackelyn Garsia understands she or the provider may request at any time to terminate the video visit and request the patient to seek care or treatment in person

## 2022-07-28 ENCOUNTER — TMS (OUTPATIENT)
Dept: PSYCHIATRY | Facility: CLINIC | Age: 37
End: 2022-07-28
Payer: COMMERCIAL

## 2022-07-28 DIAGNOSIS — F43.10 PTSD (POST-TRAUMATIC STRESS DISORDER): ICD-10-CM

## 2022-07-28 DIAGNOSIS — F33.2 SEVERE EPISODE OF RECURRENT MAJOR DEPRESSIVE DISORDER, WITHOUT PSYCHOTIC FEATURES (HCC): Primary | ICD-10-CM

## 2022-07-28 DIAGNOSIS — F32.81 PMDD (PREMENSTRUAL DYSPHORIC DISORDER): ICD-10-CM

## 2022-07-28 DIAGNOSIS — F41.1 GENERALIZED ANXIETY DISORDER: ICD-10-CM

## 2022-07-28 DIAGNOSIS — F60.3 BORDERLINE PERSONALITY DISORDER (HCC): ICD-10-CM

## 2022-07-28 PROCEDURE — 90792 PSYCH DIAG EVAL W/MED SRVCS: CPT | Performed by: PSYCHIATRY & NEUROLOGY

## 2022-08-26 ENCOUNTER — TELEPHONE (OUTPATIENT)
Dept: OTHER | Facility: OTHER | Age: 37
End: 2022-08-26

## 2022-08-26 ENCOUNTER — TELEMEDICINE (OUTPATIENT)
Dept: PSYCHIATRY | Facility: CLINIC | Age: 37
End: 2022-08-26
Payer: COMMERCIAL

## 2022-08-26 DIAGNOSIS — F33.2 SEVERE EPISODE OF RECURRENT MAJOR DEPRESSIVE DISORDER, WITHOUT PSYCHOTIC FEATURES (HCC): Primary | ICD-10-CM

## 2022-08-26 DIAGNOSIS — F41.1 GENERALIZED ANXIETY DISORDER: ICD-10-CM

## 2022-08-26 DIAGNOSIS — F60.3 BORDERLINE PERSONALITY DISORDER (HCC): ICD-10-CM

## 2022-08-26 DIAGNOSIS — T50.905A DRUG-INDUCED WEIGHT GAIN: ICD-10-CM

## 2022-08-26 DIAGNOSIS — R63.5 DRUG-INDUCED WEIGHT GAIN: ICD-10-CM

## 2022-08-26 PROCEDURE — 99214 OFFICE O/P EST MOD 30 MIN: CPT | Performed by: PSYCHIATRY & NEUROLOGY

## 2022-08-26 RX ORDER — ESCITALOPRAM OXALATE 20 MG/1
20 TABLET ORAL DAILY
Qty: 30 TABLET | Refills: 2 | Status: SHIPPED | OUTPATIENT
Start: 2022-08-26

## 2022-08-26 RX ORDER — QUETIAPINE FUMARATE 50 MG/1
100 TABLET, EXTENDED RELEASE ORAL EVERY EVENING
Qty: 28 TABLET | Refills: 2 | Status: SHIPPED | OUTPATIENT
Start: 2022-08-26 | End: 2022-10-13

## 2022-08-26 RX ORDER — QUETIAPINE 200 MG/1
200 TABLET, FILM COATED, EXTENDED RELEASE ORAL
Qty: 30 TABLET | Refills: 2 | Status: SHIPPED | OUTPATIENT
Start: 2022-08-26 | End: 2022-11-24

## 2022-08-26 RX ORDER — LORAZEPAM 0.5 MG/1
0.5 TABLET ORAL 2 TIMES DAILY PRN
Qty: 30 TABLET | Refills: 1 | Status: SHIPPED | OUTPATIENT
Start: 2022-08-26 | End: 2022-10-13

## 2022-08-26 RX ORDER — BUSPIRONE HYDROCHLORIDE 30 MG/1
30 TABLET ORAL 2 TIMES DAILY
Qty: 60 TABLET | Refills: 2 | Status: SHIPPED | OUTPATIENT
Start: 2022-08-26

## 2022-08-26 NOTE — TELEPHONE ENCOUNTER
Pt  Called and she would like to know if the visit today can be changed to a virtual one  She has an appt  Today at 8 with Dr Digna Trammell  Please return the patients call to let her know if she can have the appt  Virtually

## 2022-08-26 NOTE — PSYCH
Virtual Regular Visit    Verification of patient location:    Patient is located in the following state in which I hold an active license [place]      Assessment/Plan:    Problem List Items Addressed This Visit        Other    Severe episode of recurrent major depressive disorder, without psychotic features ([facility] [address] ) - Primary    Relevant Medications    QUEtiapine (SEROquel XR) 50 mg    busPIRone (BUSPAR) 30 MG tablet    escitalopram (LEXAPRO) 20 mg tablet    QUEtiapine (SEROquel XR) 200 mg 24 hr tablet    LORazepam (Ativan) 0 5 mg tablet    Generalized anxiety disorder    Relevant Medications    QUEtiapine (SEROquel XR) 50 mg    busPIRone (BUSPAR) 30 MG tablet    escitalopram (LEXAPRO) 20 mg tablet    QUEtiapine (SEROquel XR) 200 mg 24 hr tablet    LORazepam (Ativan) 0 5 mg tablet    Borderline personality disorder (HCC)    Relevant Medications    QUEtiapine (SEROquel XR) 50 mg    busPIRone (BUSPAR) 30 MG tablet    escitalopram (LEXAPRO) 20 mg tablet    QUEtiapine (SEROquel XR) 200 mg 24 hr tablet    LORazepam (Ativan) 0 5 mg tablet      Other Visit Diagnoses     Drug-induced weight gain        Relevant Orders    Ambulatory Referral to Weight Management          Goals addressed in session: Goal 1 and Goal 2          Reason for visit is   Chief Complaint   Patient presents with    Virtual Regular Visit        Encounter provider Delta Richards MD    Provider located at [address] [id number]      Recent Visits  No visits were found meeting these conditions  Showing recent visits within past 7 days and meeting all other requirements  Today's Visits  Date Type Provider Dept   08/26/22 [facility] visits and meeting all other requirements  Future Appointments  No visits were found meeting these conditions    Showing future appointments within next 150 days and meeting all other requirements       The patient was identified by name and date of birth  Venancio Adam was informed that this is a telemedicine visit and that the visit is being conducted through[employer] Embedded and patient was informed this is a secure, HIPAA-complaint platform  She agrees to proceed     My office door was closed  No one else was in the room  She acknowledged consent and understanding of privacy and security of the video platform  The patient has agreed to participate and understands they can discontinue the visit at any time  Patient is aware this is a billable service  Subjective  See below      HPI     Past Medical History:   Diagnosis Date    Anxiety     Borderline personality disorder ([facility] [address] )     Depression     Psychiatric illness     Self-injurious behavior     Suicide attempt ([facility] [address] )        No past surgical history on file  Current Outpatient Medications   Medication Sig Dispense Refill    busPIRone (BUSPAR) 30 MG tablet Take 1 tablet (30 mg total) by mouth 2 (two) times a day 60 tablet 2    escitalopram (LEXAPRO) 20 mg tablet Take 1 tablet (20 mg total) by mouth daily 30 tablet 2    LORazepam (Ativan) 0 5 mg tablet Take 1 tablet (0 5 mg total) by mouth 2 (two) times a day as needed for anxiety 30 tablet 1    QUEtiapine (SEROquel XR) 200 mg 24 hr tablet Take 1 tablet (200 mg total) by mouth daily at bedtime 30 tablet 2    QUEtiapine (SEROquel XR) 50 mg Take 2 tablets (100 mg total) by mouth every evening for 14 days Around every 2 weeks before menstrual period 28 tablet 2    albuterol (PROVENTIL HFA,VENTOLIN HFA) 90 mcg/act inhaler Inhale 2 puffs every 4 (four) hours as needed for wheezing (Patient not taking: No sig reported) 8 g 0    cyanocobalamin (VITAMIN B-12) 100 mcg tablet Take 1 tablet by mouth every 24 hours       No current facility-administered medications for this visit          Allergies   Allergen Reactions    Ceclor [Cefaclor] Hives    Nuts - Food Allergy GI Intolerance    Latex Rash       Review of Systems    Video Exam    There were no vitals filed for this visit  Physical Exam     I spent 24 minutes directly with the patient during this visit    [address]      Name and Date of Birth:  Prem Perry  1985 MRN: [id number]    Date of Visit: August 26, 2022    Reason for Visit:  Follow-up for medication management  Subjective: The patient reports she has been doing very well  Feels her depression has significantly been better  Denies any suicidal thoughts for last 2 weeks  Reports her  and her friend also has noted significant improvement in her mood  She denies any irritability or mood swings  Denies any anger outbursts lately  She though reports she still feels very depressed around if we could 2 before her menstrual period starts but resolves after her periods is over  The patient reports her sleep and appetite has been good  She reports she has been taking Seroquel around 4 hours before going to bed and it has made a significant difference  She reports she does not feel tired in the morning  Reports able to wake up by 7:00 a m  and feels alert  Reports her anxiety also has been significantly better  Denies any panic attack  No somatic complaint  Denies any auditory or visual hallucination  Does not endorse any paranoia or delusion  Compliant with her medication and denies any side effect except for weight gain  The patient current psychiatric medication includes Lexapro and Seroquel XR which both can cause weight gain  The patient though feels the medication has been helpful and the benefit of continuing on it is more than the risk  She though agrees to follow-up with weight management center and I will send in the referral today  The patient had followed with psychiatrist for [facility] evaluation    The patient after reviewing both benefit versus risk wants to have [address] done and at this time is waiting for her insurance to approve  She continues to report having good support from her family and friends  Patient      Review Of Systems:      Constitutional negative   ENT negative   Cardiovascular negative   Respiratory negative   Gastrointestinal negative   Genitourinary negative   Musculoskeletal negative   Integumentary negative   Neurological negative   Endocrine negative   Other Symptoms none       Alcohol/Substance Abuse:  Denies        Past Medical History:    Past Medical History:   Diagnosis Date    Anxiety     Borderline personality disorder ([facility] [address] )     Depression     Psychiatric illness     Self-injurious behavior     [facility]         No past surgical history on file    Allergies   Allergen Reactions    Ceclor [Cefaclor] Hives    Nuts - Food Allergy GI Intolerance    Latex Rash       Current Medications:       Current Outpatient Medications:     busPIRone (BUSPAR) 30 MG tablet, Take 1 tablet (30 mg total) by mouth 2 (two) times a day, Disp: 60 tablet, Rfl: 2    escitalopram (LEXAPRO) 20 mg tablet, Take 1 tablet (20 mg total) by mouth daily, Disp: 30 tablet, Rfl: 2    LORazepam (Ativan) 0 5 mg tablet, Take 1 tablet (0 5 mg total) by mouth 2 (two) times a day as needed for anxiety, Disp: 30 tablet, Rfl: 1    QUEtiapine (SEROquel XR) 200 mg 24 hr tablet, Take 1 tablet (200 mg total) by mouth daily at bedtime, Disp: 30 tablet, Rfl: 2    QUEtiapine (SEROquel XR) 50 mg, Take 2 tablets (100 mg total) by mouth every evening for 14 days Around every 2 weeks before menstrual period, Disp: 28 tablet, Rfl: 2    albuterol (PROVENTIL HFA,VENTOLIN HFA) 90 mcg/act inhaler, Inhale 2 puffs every 4 (four) hours as needed for wheezing (Patient not taking: No sig reported), Disp: 8 g, Rfl: 0    cyanocobalamin (VITAMIN B-12) 100 mcg tablet, Take 1 tablet by mouth every 24 hours, Disp: , Rfl:        History Review: The following portions of the patient's history were reviewed and updated as appropriate: allergies, current medications, past family history, past medical history, past social history, past surgical history and problem list          OBJECTIVE:     Vital signs in last 24 hours: There were no vitals filed for this visit      Mental Status Evaluation:    Appearance age appropriate, casually dressed   Behavior cooperative, calm   Speech normal rate, normal volume, normal pitch   Mood euthymic   Affect normal range and intensity, appropriate   Thought Processes organized, goal directed   Associations intact associations   Thought Content no overt delusions   Perceptual Disturbances: no auditory hallucinations, no visual hallucinations   Abnormal Thoughts  Risk Potential Suicidal ideation - None  Homicidal ideation - None  Potential for aggression - No   Orientation oriented to person, place, time/date and situation   Memory recent and remote memory grossly intact   Consciousness alert and awake   Attention Span Concentration Span attention span and concentration are age appropriate   Intellect appears to be of average intelligence   Insight intact   Judgement intact   Muscle Strength and  Gait unable to assess today due to virtual visit   Motor activity unable to assess today due to virtual visit   Language no difficulty naming common objects, no difficulty repeating a phrase   Fund of Knowledge adequate knowledge of current events  adequate fund of knowledge regarding past history  adequate fund of knowledge regarding vocabulary    Pain none   Pain Scale 0       Laboratory Results:   Most Recent Labs:   Lab Results   Component Value Date    WBC 8 79 11/04/2021    RBC 4 78 11/04/2021    HGB 13 7 11/04/2021    HCT 43 1 11/04/2021     11/04/2021    RDW 13 1 11/04/2021    NEUTROABS 4 41 11/04/2021    K 4 1 11/04/2021     11/04/2021    CO2 25 11/04/2021    BUN 15 11/04/2021    CREATININE 0 82 11/04/2021    GLUCOSE 109 12/21/2016    CALCIUM 8 9 11/04/2021    AST 15 11/04/2021    ALT 25 11/04/2021    ALKPHOS 66 11/04/2021    CHOLESTEROL 171 11/04/2021    TRIG 105 11/04/2021    HDL 44 11/04/2021    LDLCALC 106 (H) 11/04/2021    NONHDLC 127 11/04/2021    ITN5HWBSMSHN 1 299 11/04/2021    FREET4 0 93 12/07/2016    T3FREE 2 75 12/07/2016    PREGSERUM Negative 12/07/2016    RPR Non-Reactive 11/04/2021     I have personally reviewed all pertinent laboratory/tests results  Assessment/Plan:  The patient at this time is doing much better with depression in early remission  Though she does struggle with mood swings and depression around her menstrual period which can last for a week to 2  No SI or HI reported  Has a good family support  Plan is to add Seroquel  mg for 2 weeks around 2 weeks before her menstrual period  The patient had felt significant improvement for her depression and mood stability on Seroquel XR  The patient was advised in case she starts the [address] soon then to avoid taking extra dose of Seroquel XR till she is done with the [address]  The patient for rest of the month will continue with Seroquel  mg daily in the evening  She also will be continued on her other medication at this time with no changes  She was educated in detail about her current medication including benefit, risk, side effects, alternative, contraindication, dosage and frequency  She verbalized understanding agrees with the current plan  She was advised to call us if there is any concern and to call crisis or visit nearby ER in case of any emergency or having any SI or HI  The patient agrees  I will also send in the referral to weight management clinic  The patient also was advised to schedule it sooner appointment with her OBGYN to discuss if there is any other treatment recommendation by them for her premenstrual dysphoric disorder    Patient agreed      Diagnoses and all orders for this visit:    Severe episode of recurrent major depressive disorder, without psychotic features ([facility] [address] )  -     QUEtiapine (SEROquel XR) 50 mg; Take 2 tablets (100 mg total) by mouth every evening for 14 days Around every 2 weeks before menstrual period  -     escitalopram (LEXAPRO) 20 mg tablet; Take 1 tablet (20 mg total) by mouth daily  -     QUEtiapine (SEROquel XR) 200 mg 24 hr tablet; Take 1 tablet (200 mg total) by mouth daily at bedtime    Generalized anxiety disorder  -     QUEtiapine (SEROquel XR) 50 mg; Take 2 tablets (100 mg total) by mouth every evening for 14 days Around every 2 weeks before menstrual period  -     busPIRone (BUSPAR) 30 MG tablet; Take 1 tablet (30 mg total) by mouth 2 (two) times a day  -     escitalopram (LEXAPRO) 20 mg tablet; Take 1 tablet (20 mg total) by mouth daily  -     LORazepam (Ativan) 0 5 mg tablet; Take 1 tablet (0 5 mg total) by mouth 2 (two) times a day as needed for anxiety    Borderline personality disorder (HCC)  -     QUEtiapine (SEROquel XR) 200 mg 24 hr tablet; Take 1 tablet (200 mg total) by mouth daily at bedtime    Drug-induced weight gain  -     Ambulatory Referral to Weight Management; Future          Treatment Recommendations/Precautions:      Aware of 24 hour and weekend coverage for urgent situations accessed by calling [facility] main practice number    Risks/Benefits      Risks, Benefits And Possible Side Effects Of Medications:    Risks, benefits, and possible side effects of medications explained to Robert Scott and she verbalizes understanding and agreement for treatment  Risks of medications in pregnancy explained to Robert Scott  She verbalizes understanding and agrees to notify her doctor if she becomes pregnant      Controlled Medication Discussion:     Chidester Victor has been filling controlled prescriptions on time as prescribed according to [facility]  Discussed with Robert Scott the risks of sedation, respiratory depression, impairment of ability to drive and potential for abuse and addiction related to treatment with benzodiazepine medications  She understands risk of treatment with benzodiazepine medications, agrees to not drive if feels impaired and agrees to take medications as prescribed  Discussed with Flor Cohen warning on concurrent use of benzodiazepines and opioid medications including sedation, respiratory depression, coma and death  She understands the risk of treatment with benzodiazepines in addition to opioids and wants to continue taking those medications  Discussed with Clem Jung increased risk of impairment related to concurrent use of benzodiazepines and hypnotic medications including excessive sedation, psychomotor impairment and respiratory depression  She understands the risk of treatment with benzodiazepines in addition to hypnotic medications and wants to continue taking those medications    Psychotherapy Provided:     Individual psychotherapy provided: Counseling was provided during the session today for 16 minutes  Medications, treatment progress and treatment plan reviewed with Clem Jung  Medication changes discussed with Jackelyn  Medication education provided to Clem Jung  Reassurance and supportive therapy provided  Crisis/safety plan discussed with Jackelyn       Treatment Plan;    Completed and signed during the session: Not applicable - Treatment Plan not due at this session    General Jude MD 08/26/22

## 2022-08-30 ENCOUNTER — DOCUMENTATION (OUTPATIENT)
Dept: PSYCHIATRY | Facility: CLINIC | Age: 37
End: 2022-08-30

## 2022-08-30 NOTE — PSYCH
Had good dialogue with Dr Deb Calix related to this patient, her SI, symptoms, personality traits, resources and supports, safety and [facility] course  Dr Deb Calix feels [facility] appropriate course currently and that higher levels of care such as BHU are not necessary presently  He has worked with her some time now and has been able to work closely with her and her  to build safety plans and good collaborations and he feels she has been good at engaging that plan and her supports to keep herself safe

## 2022-09-15 ENCOUNTER — TMS (OUTPATIENT)
Dept: PSYCHIATRY | Facility: CLINIC | Age: 37
End: 2022-09-15
Payer: COMMERCIAL

## 2022-09-15 DIAGNOSIS — F33.2 SEVERE EPISODE OF RECURRENT MAJOR DEPRESSIVE DISORDER, WITHOUT PSYCHOTIC FEATURES (HCC): Primary | ICD-10-CM

## 2022-09-15 PROCEDURE — 90867 TCRANIAL MAGN STIM TX PLAN: CPT | Performed by: PSYCHIATRY & NEUROLOGY

## 2022-09-15 NOTE — PSYCH
I performed the [address] [facility] mapping and treatment today for [employer]  See [employer] documentation by Karel Abreu, will be scanned into [employer]  Her  Sophie Delarosa and Resident Dr Leigh Overton were also present  PHQ-9 21, [place] suicide rating scale was 1- Yes, 2- No     Coil angle adjusted to +25 degrees due to L maxillary/tooth pain that was overall well tolerated at a 4/10   A/P: Patient tolerated session well, continued treatment needed and will proceed with ongoing treatment

## 2022-09-16 ENCOUNTER — TMS (OUTPATIENT)
Dept: PSYCHIATRY | Facility: CLINIC | Age: 37
End: 2022-09-16
Payer: COMMERCIAL

## 2022-09-16 DIAGNOSIS — F33.2 SEVERE EPISODE OF RECURRENT MAJOR DEPRESSIVE DISORDER, WITHOUT PSYCHOTIC FEATURES (HCC): ICD-10-CM

## 2022-09-16 PROCEDURE — 90868 TCRANIAL MAGN STIM TX DELI: CPT

## 2022-09-16 NOTE — PSYCH
Carmen Pabon returns for [employer] treatment  Full treatment completed with MT increased by 5% X2 to end with MT at 100%

## 2022-09-19 ENCOUNTER — TMS (OUTPATIENT)
Dept: PSYCHIATRY | Facility: CLINIC | Age: 37
End: 2022-09-19
Payer: COMMERCIAL

## 2022-09-19 DIAGNOSIS — F33.2 SEVERE EPISODE OF RECURRENT MAJOR DEPRESSIVE DISORDER, WITHOUT PSYCHOTIC FEATURES (HCC): ICD-10-CM

## 2022-09-19 PROCEDURE — 90868 TCRANIAL MAGN STIM TX DELI: CPT

## 2022-09-19 NOTE — PSYCH
Nicholas Rodney returns for [employer] treatment  Full treatment completed with MT increased by 5% X2  To end with MT at 105%  Full treatment completed and tolerated well with no adverse events

## 2022-09-20 ENCOUNTER — TMS (OUTPATIENT)
Dept: PSYCHIATRY | Facility: CLINIC | Age: 37
End: 2022-09-20
Payer: COMMERCIAL

## 2022-09-20 DIAGNOSIS — F33.2 SEVERE EPISODE OF RECURRENT MAJOR DEPRESSIVE DISORDER, WITHOUT PSYCHOTIC FEATURES (HCC): ICD-10-CM

## 2022-09-20 PROCEDURE — 90868 TCRANIAL MAGN STIM TX DELI: CPT

## 2022-09-20 NOTE — PSYCH
Bobbi Pineda returns for [employer] treatment  Full treatment completed with MT increased by 5% X2 to end with MT at 115%  Full treatment completed and tolerated well with no adverse events 
consult w/ pt's family directly relating to pts condition/documentation/consultation with other physicians/direct patient care (not related to procedure)/additional history taking/interpretation of diagnostic studies

## 2022-09-21 ENCOUNTER — TMS (OUTPATIENT)
Dept: PSYCHIATRY | Facility: CLINIC | Age: 37
End: 2022-09-21
Payer: COMMERCIAL

## 2022-09-21 DIAGNOSIS — F33.2 SEVERE EPISODE OF RECURRENT MAJOR DEPRESSIVE DISORDER, WITHOUT PSYCHOTIC FEATURES (HCC): ICD-10-CM

## 2022-09-21 PROCEDURE — 90868 TCRANIAL MAGN STIM TX DELI: CPT

## 2022-09-21 NOTE — PSYCH
Patricio pyle for [address] treatment  Full treatment completed with MT increased by 5% x1 to end with MT at 120%  Full treatment completed and tolerated well with no adverse events

## 2022-09-22 ENCOUNTER — TMS (OUTPATIENT)
Dept: PSYCHIATRY | Facility: CLINIC | Age: 37
End: 2022-09-22
Payer: COMMERCIAL

## 2022-09-22 DIAGNOSIS — F33.2 SEVERE EPISODE OF RECURRENT MAJOR DEPRESSIVE DISORDER, WITHOUT PSYCHOTIC FEATURES (HCC): ICD-10-CM

## 2022-09-22 PROCEDURE — 90868 TCRANIAL MAGN STIM TX DELI: CPT

## 2022-09-22 NOTE — PSYCH
Starr Givens returns for [employer] treatment  PHQ-9 done today  Full treatment completed and tolerated well with no adverse events

## 2022-09-23 ENCOUNTER — TMS (OUTPATIENT)
Dept: PSYCHIATRY | Facility: CLINIC | Age: 37
End: 2022-09-23
Payer: COMMERCIAL

## 2022-09-23 DIAGNOSIS — F33.2 SEVERE EPISODE OF RECURRENT MAJOR DEPRESSIVE DISORDER, WITHOUT PSYCHOTIC FEATURES (HCC): ICD-10-CM

## 2022-09-23 PROCEDURE — 90868 TCRANIAL MAGN STIM TX DELI: CPT

## 2022-09-23 NOTE — PSYCH
Rocio Laura returns for [employer] treatment  Rocio Sanchez brings along her friend for treatment and and a [employer] visitor waiver is signed  Full treatment completed and tolerated well with no adverse events

## 2022-09-26 ENCOUNTER — TMS (OUTPATIENT)
Dept: PSYCHIATRY | Facility: CLINIC | Age: 37
End: 2022-09-26
Payer: COMMERCIAL

## 2022-09-26 DIAGNOSIS — F33.2 SEVERE EPISODE OF RECURRENT MAJOR DEPRESSIVE DISORDER, WITHOUT PSYCHOTIC FEATURES (HCC): ICD-10-CM

## 2022-09-26 PROCEDURE — 90868 TCRANIAL MAGN STIM TX DELI: CPT

## 2022-09-26 NOTE — PSYCH
Nico Holloway returns for [employer] treatment  Full treatment completed and tolerated well with no adverse events

## 2022-09-27 ENCOUNTER — TMS (OUTPATIENT)
Dept: PSYCHIATRY | Facility: CLINIC | Age: 37
End: 2022-09-27
Payer: COMMERCIAL

## 2022-09-27 DIAGNOSIS — F33.2 SEVERE EPISODE OF RECURRENT MAJOR DEPRESSIVE DISORDER, WITHOUT PSYCHOTIC FEATURES (HCC): ICD-10-CM

## 2022-09-27 PROCEDURE — 90868 TCRANIAL MAGN STIM TX DELI: CPT

## 2022-09-27 NOTE — PSYCH
Gopal Gonzalez returns for [employer] treatment  Full treatment completed and tolerated well with no adverse events

## 2022-09-28 ENCOUNTER — TMS (OUTPATIENT)
Dept: PSYCHIATRY | Facility: CLINIC | Age: 37
End: 2022-09-28
Payer: COMMERCIAL

## 2022-09-28 DIAGNOSIS — F33.2 SEVERE EPISODE OF RECURRENT MAJOR DEPRESSIVE DISORDER, WITHOUT PSYCHOTIC FEATURES (HCC): ICD-10-CM

## 2022-09-28 PROCEDURE — 90868 TCRANIAL MAGN STIM TX DELI: CPT

## 2022-09-28 NOTE — PSYCH
Jose Raul Ladners returns for [employer] treatment  PHQ-9 done today  Full treatment completed and tolerated well with no adverse events

## 2022-10-11 ENCOUNTER — TMS (OUTPATIENT)
Dept: PSYCHIATRY | Facility: CLINIC | Age: 37
End: 2022-10-11
Payer: COMMERCIAL

## 2022-10-11 DIAGNOSIS — F33.2 SEVERE EPISODE OF RECURRENT MAJOR DEPRESSIVE DISORDER, WITHOUT PSYCHOTIC FEATURES (HCC): ICD-10-CM

## 2022-10-11 PROCEDURE — 90868 TCRANIAL MAGN STIM TX DELI: CPT

## 2022-10-11 NOTE — PSYCH
Clem Jung returns for [employer] treatment  Full treatment completed and tolerated well with no adverse events

## 2022-10-12 ENCOUNTER — TMS (OUTPATIENT)
Dept: PSYCHIATRY | Facility: CLINIC | Age: 37
End: 2022-10-12
Payer: COMMERCIAL

## 2022-10-12 DIAGNOSIS — F33.2 SEVERE EPISODE OF RECURRENT MAJOR DEPRESSIVE DISORDER, WITHOUT PSYCHOTIC FEATURES (HCC): ICD-10-CM

## 2022-10-12 PROCEDURE — 90868 TCRANIAL MAGN STIM TX DELI: CPT

## 2022-10-12 NOTE — PSYCH
Robert Scott returns for [employer] treatment  Full treatment completed and tolerated well with no adverse events

## 2022-10-13 ENCOUNTER — CONSULT (OUTPATIENT)
Dept: BARIATRICS | Facility: CLINIC | Age: 37
End: 2022-10-13
Payer: COMMERCIAL

## 2022-10-13 ENCOUNTER — TMS (OUTPATIENT)
Dept: PSYCHIATRY | Facility: CLINIC | Age: 37
End: 2022-10-13
Payer: COMMERCIAL

## 2022-10-13 VITALS
HEART RATE: 84 BPM | SYSTOLIC BLOOD PRESSURE: 124 MMHG | WEIGHT: 229.4 LBS | DIASTOLIC BLOOD PRESSURE: 68 MMHG | OXYGEN SATURATION: 99 % | BODY MASS INDEX: 36.87 KG/M2 | HEIGHT: 66 IN | RESPIRATION RATE: 18 BRPM

## 2022-10-13 DIAGNOSIS — F33.2 SEVERE EPISODE OF RECURRENT MAJOR DEPRESSIVE DISORDER, WITHOUT PSYCHOTIC FEATURES (HCC): ICD-10-CM

## 2022-10-13 DIAGNOSIS — E66.9 OBESITY, CLASS II, BMI 35-39.9: Primary | ICD-10-CM

## 2022-10-13 PROCEDURE — 99203 OFFICE O/P NEW LOW 30 MIN: CPT | Performed by: FAMILY MEDICINE

## 2022-10-13 PROCEDURE — 90868 TCRANIAL MAGN STIM TX DELI: CPT

## 2022-10-13 NOTE — PATIENT INSTRUCTIONS
Goals:  Do not skip any meals! Food log (ie ) www myfitnesspal com,sparkpeople  com,loseit com,calorieking  com,etc  baritastic (use skinnytaste  com, dietdoctor  com or smartphone travis [employer] Plan for recipes)  No sugary beverages  At least 64oz of water daily  Increase physical activity by 10 minutes daily   Gradually increase physical activity to a goal of 5 days per week for 30 minutes of MODERATE intensity PLUS 2 days per week of FULL BODY resistance training (use smartphone apps FitON, Home Workout, etc )  Start Healthy Core

## 2022-10-13 NOTE — PROGRESS NOTES
Assessment/Plan:  Concha Carver was seen today for consult  Diagnoses and all orders for this visit:    Obesity, Class II, BMI 35-39 9  -     Ambulatory Referral to Weight Management     - Discussed options of HealthyCORE-Intensive Lifestyle Intervention Program, Very Low Calorie Diet-VLCD and Conservative Program and the role of weight loss medications  - Explained the importance of making lifestyle changes first before starting any anti-obesity medications  Patient should demonstrate lifestyle changes first before anti-obesity medication can be initiated  - Patient is interested in pursuing HealthyCORE-Intensive Lifestyle Intervention Program  - Initial weight loss goal of 5-10% weight loss for improved health  - Weight loss can improve patient's co-morbid conditions and/or prevent weight-related complications  New patient packet reviewed with patient  Other recommendations as below  Goals:  Do not skip any meals! Food log (ie ) www myfitnesspal com,sparkpeople  com,loseit com,calorieking  com,etc  baritastic (use skinnytaste  com, dietdoctor  com or smartphone travis [employer] for recipes)  No sugary beverages  At least 64oz of water daily  Increase physical activity by 10 minutes daily  Gradually increase physical activity to a goal of 5 days per week for 30 minutes of MODERATE intensity PLUS 2 days per week of FULL BODY resistance training (use smartphone apps Sunfire, Home Workout, etc )  Start Healthy Core    Total time spent: 30 min, with >50% face-to-face time spent counseling patient on nonsurgical interventions for the treatment of excess weight  Discussed in detail nonsurgical options including intensive lifestyle intervention program, very low-calorie diet program and conservative program   Discussed the role of weight loss medications  Counseled patient on diet behavior and exercise modification for weight loss        Follow up in approximately 3 months with Non-Surgical Physician/Advanced Practitioner  Subjective:   Chief Complaint   Patient presents with   • Consult     MWM goal a healthy wt, waist 44, s/b 1-8 neg        Patient ID: Aundrea Curtis  is a 42 y o  female with excess weight/obesity here to pursue weight management  Previous notes and records have been reviewed  Past Medical History:   Diagnosis Date   • Anxiety    • Borderline personality disorder ([facility] [address] )    • Depression    • Psychiatric illness    • Self-injurious behavior    • Suicide attempt [facility])      History reviewed  No pertinent surgical history  HPI:  Patient presents for medical weight management consultation  She is looking for a program that is sustainable and not just a “fad diet“  Wt Readings from Last 20 Encounters:   10/13/22 104 kg (229 lb 6 4 oz)   21 92 1 kg (203 lb)   21 89 4 kg (197 lb)   16 72 6 kg (160 lb)     Obesity/Excess Weight:  Severity: Moderate  Onset:  Over the years    Modifiers: Diet and Exercise, Commercial Weight Loss Programs-ie  Weight Watchers, Adron Fede, Nutrisystem, etc  and shakology, noom  Contributing factors: Poor Food Choices, Insufficient Physical Activity and Medications  Associated symptoms: increased joint pain and depression    Not currently food logging    B- Shakology shake w/ PB  S- Kind bar  L- skips  S- chips  D- takeout or meat and starch (limited veggies)  S- ice cream    Hydration: 64oz water daily, 8oz coffee w/ sweetened hazelnut creamer, 8oz herbal tea plain  Alcohol: no  Smokin/2 PPD  Exercise: stretches for 15min nightly, 20min stationary bike once weekly  Occupation: Sedentary  Sleep:8-10hrs  STOP ban/8    The following portions of the patient's history were reviewed and updated as appropriate: allergies, current medications, past family history, past medical history, past social history, past surgical history, and problem list     Review of Systems   Constitutional: Negative for fever     Respiratory: Negative for shortness of breath  Cardiovascular: Negative for chest pain  Gastrointestinal: Positive for abdominal pain (Due to GERD)  Negative for blood in stool  Genitourinary: Negative for dysuria and hematuria  Musculoskeletal: Positive for arthralgias, back pain and myalgias  Skin: Positive for rash  Neurological: Positive for headaches (Occ)  Psychiatric/Behavioral: Positive for dysphoric mood and suicidal ideas (Not recently but attempted suicide in Nov 2021)  The patient is nervous/anxious  Objective:  /68   Pulse 84   Resp 18   Ht 5' 5 5" (1 664 m)   Wt 104 kg (229 lb 6 4 oz)   SpO2 99%   BMI 37 59 kg/m²   Constitutional: Well-developed, well-nourished and Obese Body mass index is 37 59 kg/m²  Arpit Po HEENT: No conjunctival injection  Pulmonary: No increased work of breathing or signs of respiratory distress  CV: Well-perfused  GI: Obese  Non-distended  MSK: No edema   Neuro: Oriented to person, place and time  Normal Speech  Normal gait  Psych: Normal affect and mood  Labs and Imaging  Recent labs and imaging have been personally reviewed    Lab Results   Component Value Date    WBC 8 79 11/04/2021    HGB 13 7 11/04/2021    HCT 43 1 11/04/2021    MCV 90 11/04/2021     11/04/2021     Lab Results   Component Value Date    SODIUM 142 11/04/2021    K 4 1 11/04/2021     11/04/2021    CO2 25 11/04/2021    AGAP 13 11/04/2021    BUN 15 11/04/2021    CREATININE 0 82 11/04/2021    GLUC 89 11/04/2021    GLUF 89 11/04/2021    CALCIUM 8 9 11/04/2021    AST 15 11/04/2021    ALT 25 11/04/2021    ALKPHOS 66 11/04/2021    TP 7 0 11/04/2021    TBILI 0 20 11/04/2021    EGFR 91 11/04/2021     Lab Results   Component Value Date    HGBA1C 5 5 11/04/2021     Lab Results   Component Value Date    OKP9EQRBRWSL 1 299 11/04/2021     Lab Results   Component Value Date    CHOLESTEROL 171 11/04/2021     Lab Results   Component Value Date    HDL 44 11/04/2021     Lab Results   Component Value Date    TRIG 105 11/04/2021     Lab Results   Component Value Date    LDLCALC 106 (H) 11/04/2021

## 2022-10-13 NOTE — PSYCH
[facility] returns for [employer] treatment  PHQ-9 done today  [facility] states she has been feeling happy in the mornings when she wakes up  Full treatment completed and tolerated well with no adverse events

## 2022-10-14 ENCOUNTER — TMS (OUTPATIENT)
Dept: PSYCHIATRY | Facility: CLINIC | Age: 37
End: 2022-10-14
Payer: COMMERCIAL

## 2022-10-14 DIAGNOSIS — F33.2 SEVERE EPISODE OF RECURRENT MAJOR DEPRESSIVE DISORDER, WITHOUT PSYCHOTIC FEATURES (HCC): ICD-10-CM

## 2022-10-14 PROCEDURE — 90868 TCRANIAL MAGN STIM TX DELI: CPT

## 2022-10-14 NOTE — PSYCH
Faisal Cohen returns for [employer] treatment  Full treatment completed and tolerated well with no adverse events

## 2022-10-17 ENCOUNTER — TMS (OUTPATIENT)
Dept: PSYCHIATRY | Facility: CLINIC | Age: 37
End: 2022-10-17
Payer: COMMERCIAL

## 2022-10-17 DIAGNOSIS — F33.2 SEVERE EPISODE OF RECURRENT MAJOR DEPRESSIVE DISORDER, WITHOUT PSYCHOTIC FEATURES (HCC): ICD-10-CM

## 2022-10-17 PROCEDURE — 90868 TCRANIAL MAGN STIM TX DELI: CPT

## 2022-10-17 NOTE — PSYCH
Oren Turner returns for [employer] treatment  Oren Turner has had some improvement with [employer] treatment  She has been speaking and smiling more  Full treatment completed and tolerated well with no adverse events

## 2022-10-18 ENCOUNTER — TMS (OUTPATIENT)
Dept: PSYCHIATRY | Facility: CLINIC | Age: 37
End: 2022-10-18
Payer: COMMERCIAL

## 2022-10-18 DIAGNOSIS — F33.2 SEVERE EPISODE OF RECURRENT MAJOR DEPRESSIVE DISORDER, WITHOUT PSYCHOTIC FEATURES (HCC): ICD-10-CM

## 2022-10-18 PROCEDURE — 90868 TCRANIAL MAGN STIM TX DELI: CPT

## 2022-10-18 NOTE — PSYCH
[facility] returns for [employer] treatment  Full treatment completed and tolerated well with no adverse events

## 2022-10-19 ENCOUNTER — TMS (OUTPATIENT)
Dept: PSYCHIATRY | Facility: CLINIC | Age: 37
End: 2022-10-19
Payer: COMMERCIAL

## 2022-10-19 DIAGNOSIS — F33.2 SEVERE EPISODE OF RECURRENT MAJOR DEPRESSIVE DISORDER, WITHOUT PSYCHOTIC FEATURES (HCC): ICD-10-CM

## 2022-10-19 PROCEDURE — 90868 TCRANIAL MAGN STIM TX DELI: CPT

## 2022-10-19 NOTE — PSYCH
Victorina Agudelo returns for [employer] treatment  Full treatment completed and tolerated well with no adverse events

## 2022-10-21 ENCOUNTER — TMS (OUTPATIENT)
Dept: PSYCHIATRY | Facility: CLINIC | Age: 37
End: 2022-10-21
Payer: COMMERCIAL

## 2022-10-21 DIAGNOSIS — F33.2 SEVERE EPISODE OF RECURRENT MAJOR DEPRESSIVE DISORDER, WITHOUT PSYCHOTIC FEATURES (HCC): ICD-10-CM

## 2022-10-21 PROCEDURE — 90868 TCRANIAL MAGN STIM TX DELI: CPT

## 2022-10-21 NOTE — PSYCH
Bobbi Pineda returns for [employer] treatment  PHQ-9 done today  Full treatment completed and tolerated well with no adverse events

## 2022-10-24 ENCOUNTER — TMS (OUTPATIENT)
Dept: PSYCHIATRY | Facility: CLINIC | Age: 37
End: 2022-10-24
Payer: COMMERCIAL

## 2022-10-24 DIAGNOSIS — F33.2 SEVERE EPISODE OF RECURRENT MAJOR DEPRESSIVE DISORDER, WITHOUT PSYCHOTIC FEATURES (HCC): ICD-10-CM

## 2022-10-24 PROCEDURE — 90868 TCRANIAL MAGN STIM TX DELI: CPT

## 2022-10-24 NOTE — PSYCH
Kendra Solorzano returns for [employer] treatment  Full treatment completed and tolerated well with no adverse events

## 2022-10-25 ENCOUNTER — TMS (OUTPATIENT)
Dept: PSYCHIATRY | Facility: CLINIC | Age: 37
End: 2022-10-25
Payer: COMMERCIAL

## 2022-10-25 DIAGNOSIS — F33.2 SEVERE EPISODE OF RECURRENT MAJOR DEPRESSIVE DISORDER, WITHOUT PSYCHOTIC FEATURES (HCC): ICD-10-CM

## 2022-10-25 PROCEDURE — 90868 TCRANIAL MAGN STIM TX DELI: CPT

## 2022-10-25 NOTE — PSYCH
Sushma Calixto returns for [employer] treatment  Full treatment completed and tolerated well with no adverse events

## 2022-10-26 ENCOUNTER — TMS (OUTPATIENT)
Dept: PSYCHIATRY | Facility: CLINIC | Age: 37
End: 2022-10-26
Payer: COMMERCIAL

## 2022-10-26 DIAGNOSIS — F33.2 SEVERE EPISODE OF RECURRENT MAJOR DEPRESSIVE DISORDER, WITHOUT PSYCHOTIC FEATURES (HCC): ICD-10-CM

## 2022-10-26 PROCEDURE — 90868 TCRANIAL MAGN STIM TX DELI: CPT

## 2022-10-26 NOTE — PSYCH
Lance Rawls returns for [employer] treatment  Lacne Rawls has been more talkative and happy during treatment  Full treatment completed and tolerated well with no adverse events

## 2022-10-28 ENCOUNTER — TMS (OUTPATIENT)
Dept: PSYCHIATRY | Facility: CLINIC | Age: 37
End: 2022-10-28

## 2022-10-28 DIAGNOSIS — F33.2 SEVERE EPISODE OF RECURRENT MAJOR DEPRESSIVE DISORDER, WITHOUT PSYCHOTIC FEATURES (HCC): ICD-10-CM

## 2022-10-28 NOTE — PSYCH
Luz Oh returns for [employer] treatment  PHQ-9 done today  Full treatment completed and tolerated well with no adverse events

## 2022-10-31 ENCOUNTER — TMS (OUTPATIENT)
Dept: PSYCHIATRY | Facility: CLINIC | Age: 37
End: 2022-10-31

## 2022-10-31 DIAGNOSIS — F33.2 SEVERE EPISODE OF RECURRENT MAJOR DEPRESSIVE DISORDER, WITHOUT PSYCHOTIC FEATURES (HCC): ICD-10-CM

## 2022-10-31 NOTE — PSYCH
SHANNANsaw Jordi returns for [employer] treatment  Today is Jackelyn's 30th [employer] treatment session she will begin her taper on Wednesday 11/2/22  Full treatment completed and tolerated well with no adverse events

## 2022-11-02 ENCOUNTER — TMS (OUTPATIENT)
Dept: PSYCHIATRY | Facility: CLINIC | Age: 37
End: 2022-11-02

## 2022-11-02 DIAGNOSIS — F33.2 MAJOR DEPRESSIVE DISORDER, RECURRENT, SEVERE WITHOUT PSYCHOTIC BEHAVIOR (HCC): ICD-10-CM

## 2022-11-02 NOTE — PSYCH
Roberta Mccurdy returns for her first [facility] taper treatment  Full treatment completed and tolerated well with no adverse events

## 2022-11-03 ENCOUNTER — TMS (OUTPATIENT)
Dept: PSYCHIATRY | Facility: CLINIC | Age: 37
End: 2022-11-03

## 2022-11-03 DIAGNOSIS — F33.2 MAJOR DEPRESSIVE DISORDER, RECURRENT, SEVERE WITHOUT PSYCHOTIC BEHAVIOR (HCC): ICD-10-CM

## 2022-11-03 NOTE — PSYCH
Ele Going returns for second [facility] taper treatment  Full treatment completed and tolerated well with no adverse events

## 2022-11-07 ENCOUNTER — TMS (OUTPATIENT)
Dept: PSYCHIATRY | Facility: CLINIC | Age: 37
End: 2022-11-07

## 2022-11-07 DIAGNOSIS — F33.2 MAJOR DEPRESSIVE DISORDER, RECURRENT, SEVERE WITHOUT PSYCHOTIC BEHAVIOR (HCC): ICD-10-CM

## 2022-11-07 NOTE — PSYCH
Colt Lazo returns for her third [address] taper treatment  Colt Lazo is excited to return to her office for work  Full treatment completed and tolerated well with no adverse events

## 2022-11-08 ENCOUNTER — TMS (OUTPATIENT)
Dept: PSYCHIATRY | Facility: CLINIC | Age: 37
End: 2022-11-08

## 2022-11-08 ENCOUNTER — TELEMEDICINE (OUTPATIENT)
Dept: PSYCHIATRY | Facility: CLINIC | Age: 37
End: 2022-11-08

## 2022-11-08 DIAGNOSIS — F33.2 MAJOR DEPRESSIVE DISORDER, RECURRENT, SEVERE WITHOUT PSYCHOTIC BEHAVIOR (HCC): ICD-10-CM

## 2022-11-08 DIAGNOSIS — F41.1 GENERALIZED ANXIETY DISORDER: ICD-10-CM

## 2022-11-08 DIAGNOSIS — F60.3 BORDERLINE PERSONALITY DISORDER (HCC): ICD-10-CM

## 2022-11-08 DIAGNOSIS — F33.2 SEVERE EPISODE OF RECURRENT MAJOR DEPRESSIVE DISORDER, WITHOUT PSYCHOTIC FEATURES (HCC): ICD-10-CM

## 2022-11-08 RX ORDER — QUETIAPINE FUMARATE 50 MG/1
100 TABLET, EXTENDED RELEASE ORAL EVERY EVENING
Qty: 28 TABLET | Refills: 2 | Status: SHIPPED | OUTPATIENT
Start: 2022-11-08 | End: 2022-11-22

## 2022-11-08 RX ORDER — QUETIAPINE 200 MG/1
200 TABLET, FILM COATED, EXTENDED RELEASE ORAL
Qty: 30 TABLET | Refills: 2 | Status: SHIPPED | OUTPATIENT
Start: 2022-11-08 | End: 2023-02-06

## 2022-11-08 RX ORDER — ESCITALOPRAM OXALATE 20 MG/1
20 TABLET ORAL DAILY
Qty: 30 TABLET | Refills: 2 | Status: SHIPPED | OUTPATIENT
Start: 2022-11-08

## 2022-11-08 RX ORDER — BUSPIRONE HYDROCHLORIDE 30 MG/1
30 TABLET ORAL 2 TIMES DAILY
Qty: 60 TABLET | Refills: 2 | Status: SHIPPED | OUTPATIENT
Start: 2022-11-08

## 2022-11-08 NOTE — PSYCH
Virtual Regular Visit    Verification of patient location:    Patient is located in the following state in which I hold an active license [place]      Assessment/Plan:    Problem List Items Addressed This Visit        Other    Severe episode of recurrent major depressive disorder, without psychotic features (HCC)    Relevant Medications    escitalopram (LEXAPRO) 20 mg tablet    busPIRone (BUSPAR) 30 MG tablet    QUEtiapine (SEROquel XR) 200 mg 24 hr tablet    QUEtiapine (SEROquel XR) 50 mg    Generalized anxiety disorder    Relevant Medications    escitalopram (LEXAPRO) 20 mg tablet    busPIRone (BUSPAR) 30 MG tablet    QUEtiapine (SEROquel XR) 200 mg 24 hr tablet    QUEtiapine (SEROquel XR) 50 mg    Borderline personality disorder (HCC)    Relevant Medications    escitalopram (LEXAPRO) 20 mg tablet    busPIRone (BUSPAR) 30 MG tablet    QUEtiapine (SEROquel XR) 200 mg 24 hr tablet    QUEtiapine (SEROquel XR) 50 mg          Goals addressed in session: Goal 1 and Goal 2          Reason for visit is   Chief Complaint   Patient presents with   • Virtual Regular Visit        Encounter provider Mario Ramos MD    Provider located at [address] [address]      Recent Visits  No visits were found meeting these conditions  Showing recent visits within past 7 days and meeting all other requirements  Today's Visits  Date Type Provider Dept   11/08/22 Telemedicine Mario Ramos MD [facility] today's visits and meeting all other requirements  Future Appointments  No visits were found meeting these conditions  Showing future appointments within next 150 days and meeting all other requirements       The patient was identified by name and date of birth  Doris Ellis was informed that this is a telemedicine visit and [facility] the [facility]   Nabila agrees to proceed     My office door was closed  No one else was in the room  She acknowledged consent and understanding of privacy and security of the video platform  The patient has agreed to participate and understands they can discontinue the visit at any time  Patient is aware this is a billable service  Subjective  See below      HPI     Past Medical History:   Diagnosis Date   • Anxiety    • Borderline personality disorder ([facility] )    • Depression    • Psychiatric illness    • Self-injurious behavior    • Suicide attempt ([facility] )        No past surgical history on file  Current Outpatient Medications   Medication Sig Dispense Refill   • busPIRone (BUSPAR) 30 MG tablet Take 1 tablet (30 mg total) by mouth 2 (two) times a day 60 tablet 2   • escitalopram (LEXAPRO) 20 mg tablet Take 1 tablet (20 mg total) by mouth daily 30 tablet 2   • QUEtiapine (SEROquel XR) 200 mg 24 hr tablet Take 1 tablet (200 mg total) by mouth daily at bedtime 30 tablet 2   • QUEtiapine (SEROquel XR) 50 mg Take 2 tablets (100 mg total) by mouth every evening for 14 days Around every 2 weeks before menstrual period 28 tablet 2   • albuterol (PROVENTIL HFA,VENTOLIN HFA) 90 mcg/act inhaler Inhale 2 puffs every 4 (four) hours as needed for wheezing (Patient not taking: No sig reported) 8 g 0   • cyanocobalamin (VITAMIN B-12) 100 mcg tablet Take 1 tablet by mouth every 24 hours     • LORazepam (Ativan) 0 5 mg tablet Take 1 tablet (0 5 mg total) by mouth 2 (two) times a day as needed for anxiety 30 tablet 1     No current facility-administered medications for this visit  Allergies   Allergen Reactions   • Ceclor [Cefaclor] Hives   • Nuts - Food Allergy GI Intolerance   • Latex Rash       Review of Systems    Video Exam    There were no vitals filed for this visit  Physical Exam     Visit Time    Visit Start Time:  1:34 p m  Visit Stop Time:  1:55 p m    Total Visit Duration: 21 minutes     MEDICATION MANAGEMENT NOTE        ST  [address] [place] [address]      Name and Date of Birth:  Doris Ellis 42 y o  1985 MRN: [id number]    Date of Visit: November 8, 2022    Reason for Visit:  Follow-up for medication management  Subjective: The patient reports her mood has been much better nowadays  Denies any depression nowadays  Has underwent [address] [facility] [place] treatment and only 1 last treatment left which is next Thursday  Reports it has been very effective and she nowadays wakes up feeling happy even if there is no reason  The patient reports she still struggled for at least 2 weeks around her menstrual period last month when she was extremely depressed and having suicidal thoughts  She though reported had a very good support and safety plan which she used and did okay  She has been taking extra dose of 100 milligram of Seroquel XR which was recommended to take at least 2 weeks before her menstrual period  She reports it did not help much last month  She also has been undergoing DBT by her psychotherapist   She though reports other time of the month she has been doing very well  [place] TMs had been helpful  Reports her medication also has been effective  She currently denies any suicidal thoughts  Reports mood also has been very stable  Reports has increase in appetite but reports she has been very mindful of what she is eating and is trying to eat healthy  She also reports she plans to go for walk with her  every day  She denies any urges to hurt other  Reports energy level and concentration has been good  Reports motivation also has been very good  Has been working from home nowadays  Though also reports her boss has been very understanding and has advised her to work few days per week from home when she returns fully to work  She denies any other concern today  Denies any other side effect  Reports takes lorazepam rarely if she is very anxious    Reports mostly anxiety has been though under control  No other concerns reported today  Does not endorse any psychotic symptom or any manic or hypomanic symptoms  Review Of Systems:  Negative other than mentioned above      Constitutional negative   ENT negative   Cardiovascular negative   Respiratory negative   Gastrointestinal negative   Genitourinary negative   Musculoskeletal negative   Integumentary negative   Neurological negative   Endocrine negative   Other Symptoms none       Alcohol/Substance Abuse:  Denies        Past Medical History:    Past Medical History:   Diagnosis Date   • Anxiety    • Borderline personality disorder ([facility] )    • Depression    • Psychiatric illness    • Self-injurious behavior    • Suicide attempt ([facility] )         No past surgical history on file  Allergies   Allergen Reactions   • Ceclor [Cefaclor] Hives   • Nuts - Food Allergy GI Intolerance   • Latex Rash       Current Medications:       Current Outpatient Medications:   •  busPIRone (BUSPAR) 30 MG tablet, Take 1 tablet (30 mg total) by mouth 2 (two) times a day, Disp: 60 tablet, Rfl: 2  •  escitalopram (LEXAPRO) 20 mg tablet, Take 1 tablet (20 mg total) by mouth daily, Disp: 30 tablet, Rfl: 2  •  QUEtiapine (SEROquel XR) 200 mg 24 hr tablet, Take 1 tablet (200 mg total) by mouth daily at bedtime, Disp: 30 tablet, Rfl: 2  •  QUEtiapine (SEROquel XR) 50 mg, Take 2 tablets (100 mg total) by mouth every evening for 14 days Around every 2 weeks before menstrual period, Disp: 28 tablet, Rfl: 2  •  albuterol (PROVENTIL HFA,VENTOLIN HFA) 90 mcg/act inhaler, Inhale 2 puffs every 4 (four) hours as needed for wheezing (Patient not taking: No sig reported), Disp: 8 g, Rfl: 0  •  cyanocobalamin (VITAMIN B-12) 100 mcg tablet, Take 1 tablet by mouth every 24 hours, Disp: , Rfl:   •  LORazepam (Ativan) 0 5 mg tablet, Take 1 tablet (0 5 mg total) by mouth 2 (two) times a day as needed for anxiety, Disp: 30 tablet, Rfl: 1       History Review:  The following portions of the patient's history were reviewed and updated as appropriate: allergies, current medications, past family history, past medical history, past social history, past surgical history and problem list          OBJECTIVE:     Vital signs in last 24 hours: There were no vitals filed for this visit      Mental Status Evaluation:    Appearance age appropriate, casually dressed   Behavior cooperative, calm   Speech normal rate, normal volume, normal pitch   Mood euthymic   Affect normal range and intensity, appropriate   Thought Processes organized, goal directed   Associations intact associations   Thought Content no overt delusions   Perceptual Disturbances: no auditory hallucinations, no visual hallucinations   Abnormal Thoughts  Risk Potential Suicidal ideation - None  Homicidal ideation - None  Potential for aggression - No   Orientation oriented to person, place, time/date and situation   Memory recent and remote memory grossly intact   Consciousness alert and awake   Attention Span Concentration Span attention span and concentration are age appropriate   Intellect appears to be of average intelligence   Insight intact   Judgement intact   Muscle Strength and  Gait unable to assess today due to virtual visit   Motor activity unable to assess today due to virtual visit   Language no difficulty naming common objects, no difficulty repeating a phrase   Fund of Knowledge adequate knowledge of current events  adequate fund of knowledge regarding past history  adequate fund of knowledge regarding vocabulary    Pain none   Pain Scale 0       Laboratory Results:   Most Recent Labs:   Lab Results   Component Value Date    WBC 8 79 11/04/2021    RBC 4 78 11/04/2021    HGB 13 7 11/04/2021    HCT 43 1 11/04/2021     11/04/2021    RDW 13 1 11/04/2021    NEUTROABS 4 41 11/04/2021    K 4 1 11/04/2021     11/04/2021    CO2 25 11/04/2021    BUN 15 11/04/2021    CREATININE 0 82 11/04/2021    GLUCOSE 109 12/21/2016 CALCIUM 8 9 11/04/2021    AST 15 11/04/2021    ALT 25 11/04/2021    ALKPHOS 66 11/04/2021    CHOLESTEROL 171 11/04/2021    TRIG 105 11/04/2021    HDL 44 11/04/2021    LDLCALC 106 (H) 11/04/2021    NONHDLC 127 11/04/2021    [id number] 1 299 11/04/2021    FREET4 0 93 12/07/2016    T3FREE 2 75 12/07/2016    PREGSERUM Negative 12/07/2016    RPR Non-Reactive 11/04/2021     I have personally reviewed all pertinent laboratory/tests results  Assessment/Plan:  The patient overall has been doing well with her depression and anxiety significantly better on the current medication but still struggles with depression and occasional suicidal thoughts around her menstrual period despite of medication changes and psychotherapy  She also has undergone TMs for depression and still has not helped during her menstrual period  The patient was advised to follow-up with OBGYN to see if they can recommend any treatment option for PMDD  The patient reports she had been on birth control pill when she was young and it did not helped  She will though follow with OBGYN to see if there is other alternatives  At this time the patient overall is doing well and will be continued on the same medication with no changes  She will follow-up with me in 3 months or sooner if needed  She was advised to call us if there is any concern and to call crisis or visit nearby ER in case of any emergency or having SI or HI  She also has a good family and friend support and reaches out when she is not doing well  Diagnoses and all orders for this visit:    Generalized anxiety disorder  -     escitalopram (LEXAPRO) 20 mg tablet; Take 1 tablet (20 mg total) by mouth daily  -     busPIRone (BUSPAR) 30 MG tablet; Take 1 tablet (30 mg total) by mouth 2 (two) times a day  -     QUEtiapine (SEROquel XR) 50 mg;  Take 2 tablets (100 mg total) by mouth every evening for 14 days Around every 2 weeks before menstrual period    Severe episode of recurrent major depressive disorder, without psychotic features ([facility] [address] )  -     escitalopram (LEXAPRO) 20 mg tablet; Take 1 tablet (20 mg total) by mouth daily  -     QUEtiapine (SEROquel XR) 200 mg 24 hr tablet; Take 1 tablet (200 mg total) by mouth daily at bedtime  -     QUEtiapine (SEROquel XR) 50 mg; Take 2 tablets (100 mg total) by mouth every evening for 14 days Around every 2 weeks before menstrual period    Borderline personality disorder (HCC)  -     QUEtiapine (SEROquel XR) 200 mg 24 hr tablet; Take 1 tablet (200 mg total) by mouth daily at bedtime          Treatment Recommendations/Precautions:      Aware of 24 hour and weekend coverage for urgent situations accessed by calling [facility] main practice number    Risks/Benefits      Risks, Benefits And Possible Side Effects Of Medications:    Risks, benefits, and possible side effects of medications explained to Kimberly Zaira and she verbalizes understanding and agreement for treatment  Risks of medications in pregnancy explained to Kimberly Meneses  She verbalizes understanding and agrees to notify her doctor if she becomes pregnant  Controlled Medication Discussion:     Kimberly Meneses has been filling controlled prescriptions on time as prescribed according to [facility]  Discussed with Kimberly Meneses the risks of sedation, respiratory depression, impairment of ability to drive and potential for abuse and addiction related to treatment with benzodiazepine medications  She understands risk of treatment with benzodiazepine medications, agrees to not drive if feels impaired and agrees to take medications as prescribed    Psychotherapy Provided:     Individual psychotherapy provided: Counseling was provided during the session today for 10 minutes  Supportive psychotherapy provided  Medications, treatment progress and treatment plan reviewed with Kimberly Meneses  Medication education provided to Kimberly Meneses    Reassurance and supportive therapy provided  Crisis/safety plan discussed with Jackelyn       Treatment Plan;    Completed and signed during the session: Not applicable - Treatment Plan not due at this session    Alejandrina Shah MD 11/08/22

## 2022-11-08 NOTE — PSYCH
Daron Harmon returns for her fourth [facility] taper treatment  Full treatment completed and tolerated well with no adverse events

## 2022-11-09 ENCOUNTER — TMS (OUTPATIENT)
Dept: PSYCHIATRY | Facility: CLINIC | Age: 37
End: 2022-11-09

## 2022-11-09 DIAGNOSIS — F33.2 MAJOR DEPRESSIVE DISORDER, RECURRENT, SEVERE WITHOUT PSYCHOTIC BEHAVIOR (HCC): ICD-10-CM

## 2022-11-09 NOTE — PSYCH
Lynn Morris returns for her fifth [facility] taper treatment  Full treatment completed and tolerated well with no adverse events

## 2022-11-10 ENCOUNTER — TMS (OUTPATIENT)
Dept: PSYCHIATRY | Facility: CLINIC | Age: 37
End: 2022-11-10

## 2022-11-10 DIAGNOSIS — F33.2 MAJOR DEPRESSIVE DISORDER, RECURRENT, SEVERE WITHOUT PSYCHOTIC BEHAVIOR (HCC): ICD-10-CM

## 2022-11-10 NOTE — PSYCH
Schuyler Mayorga returns for her sixth and final [address] [facility] taper treatment  PHQ-9 done today  Regino Swartz was in to follow up with Schuyler Mayorga on her [address] [place] treatment  Schuyler Mayorga finishes treatment today and states she's been feeling happy  Full treatment completed and tolerated well with no adverse events

## 2022-11-18 ENCOUNTER — TELEPHONE (OUTPATIENT)
Dept: GYNECOLOGY | Facility: CLINIC | Age: 37
End: 2022-11-18

## 2022-11-30 ENCOUNTER — OFFICE VISIT (OUTPATIENT)
Dept: GYNECOLOGY | Facility: CLINIC | Age: 37
End: 2022-11-30

## 2022-11-30 VITALS — WEIGHT: 226.4 LBS | BODY MASS INDEX: 37.1 KG/M2 | DIASTOLIC BLOOD PRESSURE: 80 MMHG | SYSTOLIC BLOOD PRESSURE: 116 MMHG

## 2022-11-30 DIAGNOSIS — F32.81 PMDD (PREMENSTRUAL DYSPHORIC DISORDER): ICD-10-CM

## 2022-11-30 DIAGNOSIS — N92.1 MENORRHAGIA WITH IRREGULAR CYCLE: Primary | ICD-10-CM

## 2022-11-30 DIAGNOSIS — D25.1 INTRAMURAL LEIOMYOMA OF UTERUS: ICD-10-CM

## 2022-11-30 DIAGNOSIS — Z12.4 CERVICAL CANCER SCREENING: ICD-10-CM

## 2022-11-30 DIAGNOSIS — Z11.51 SCREENING FOR HUMAN PAPILLOMAVIRUS (HPV): ICD-10-CM

## 2022-11-30 PROBLEM — D25.9 FIBROID, UTERINE: Status: ACTIVE | Noted: 2022-11-30

## 2022-11-30 RX ORDER — NORETHINDRONE 0.35 MG/1
1 TABLET ORAL DAILY
COMMUNITY
Start: 2022-11-09

## 2022-11-30 RX ORDER — ESZOPICLONE 2 MG/1
TABLET, FILM COATED ORAL EVERY 24 HOURS
COMMUNITY

## 2022-11-30 RX ORDER — ACETAMINOPHEN AND CODEINE PHOSPHATE 120; 12 MG/5ML; MG/5ML
SOLUTION ORAL EVERY 24 HOURS
COMMUNITY
Start: 2022-11-09

## 2022-11-30 NOTE — PROGRESS NOTES
Assessment/Plan   Diagnoses and all orders for this visit:    Cervical cancer screening  -     Liquid-based pap, screening    Menorrhagia with irregular cycle  -     CBC; Future  -     TSH, 3rd generation; Future  -     hCG, quantitative; Future  -     Liquid-based pap, screening    Intramural leiomyoma of uterus    PMDD (premenstrual dysphoric disorder)    Screening for human papillomavirus (HPV)  -     Liquid-based pap, screening    Other orders  -     Cyanocobalamin 100 MCG LOZG; every 24 hours  -     eszopiclone (LUNESTA) 2 mg tablet; every 24 hours  -     norethindrone (MICRONOR) 0 35 MG tablet; every 24 hours  -     Jencycla 0 35 MG tablet; Take 1 tablet by mouth daily    1  Uterine fibroid-had ultrasound done 11/10/22, ordered by primary doctor  At that time, uterus was 12 0 x 6 5 x 7 1 cm with intramural, fundal myoma 7 8 x 8 2 by 6 4 cm  Patient was counseled about this along with her mother  Uterus is 10-12 week size on bimanual exam   Options for care were reviewed  To proceed with sonohysterogram as noted below and we will then proceed accordingly  2  Menorrhagia-notes cycles at 25-35 day intervals  Menstrual cycles can last anywhere between 8-10 days time  She does change her pad and tampon simultaneously at less than 4 hour intervals  Day 1 is especially heavy with large clots up to 1 in in diameter  She does bleed onto her underwear and clothing from time to time  Recommend evaluation  Laboratory sheet given for CBC, TSH, and HCG  To follow-up near future for sonohysterogram with endometrial biopsy  She was counseled in detail about this procedure and highly encouraged to take ibuprofen or naproxen prior to it  Unclear if the fibroid has any submucosal extension or not  We will assess for this and then proceed accordingly  3   PMDD-patient does have significant psychiatric history including major depression of disorder without psychotic features, general anxiety disorder, borderline personality, PTSD  She does see her psychiatry team regularly  Because of her PMDD, she was recommended to take increased dose of Seroquel in the luteal phase  Additionally, she was placed on mini pill  I agree with all these recommendations  Unclear if the mini pill will help with PMDD, we will continue to follow  4  Contraception-started mini pill 3 weeks ago  She will continue this at this time  5  Smoker-urged to quit  Avoid estrogen containing contraception  6  Right adnexal tenderness-no mass appreciated  Likely this is related to the fibroid  Ovaries were not well seen on recent ultrasound  We will attempt to assess them at upcoming ultrasound with sonohysterogram   7  Other-mother had fibroids, status post hysterectomy in her 46s  Patient has been  for 10 years time, was open to pregnancy previously  Unclear if she wishes to proceed with more definitive surgical options such as ablation or hysterectomy  Highly suggested she discuss this with him  Follow-up near future for sonohysterogram with endometrial biopsy  Subjective   Patient ID: Elbert Thibodeaux is a 42 y o  female  Vitals:    11/30/22 0937   BP: 116/80     Patient was seen today for new patient evaluation  Please see assessment plan for details  The following portions of the patient's history were reviewed and updated as appropriate: allergies, current medications, past family history, past medical history, past social history, past surgical history and problem list   Past Medical History:   Diagnosis Date   • Anxiety    • Borderline personality disorder ([facility] [address] )    • Depression    • Psychiatric illness    • Self-injurious behavior    • Suicide attempt [facility])      History reviewed  No pertinent surgical history  OB History   No obstetric history on file         Current Outpatient Medications:   •  busPIRone (BUSPAR) 30 MG tablet, Take 1 tablet (30 mg total) by mouth 2 (two) times a day, Disp: 60 tablet, Rfl: 2  • cyanocobalamin (VITAMIN B-12) 100 mcg tablet, Take 1 tablet by mouth every 24 hours, Disp: , Rfl:   •  Cyanocobalamin 100 MCG LOZG, every 24 hours, Disp: , Rfl:   •  escitalopram (LEXAPRO) 20 mg tablet, Take 1 tablet (20 mg total) by mouth daily, Disp: 30 tablet, Rfl: 2  •  eszopiclone (LUNESTA) 2 mg tablet, every 24 hours, Disp: , Rfl:   •  Jencycla 0 35 MG tablet, Take 1 tablet by mouth daily, Disp: , Rfl:   •  norethindrone (MICRONOR) 0 35 MG tablet, every 24 hours, Disp: , Rfl:   •  QUEtiapine (SEROquel XR) 200 mg 24 hr tablet, Take 1 tablet (200 mg total) by mouth daily at bedtime, Disp: 30 tablet, Rfl: 2  •  albuterol (PROVENTIL HFA,VENTOLIN HFA) 90 mcg/act inhaler, Inhale 2 puffs every 4 (four) hours as needed for wheezing (Patient not taking: No sig reported), Disp: 8 g, Rfl: 0  •  LORazepam (Ativan) 0 5 mg tablet, Take 1 tablet (0 5 mg total) by mouth 2 (two) times a day as needed for anxiety, Disp: 30 tablet, Rfl: 1  •  QUEtiapine (SEROquel XR) 50 mg, Take 2 tablets (100 mg total) by mouth every evening for 14 days Around every 2 weeks before menstrual period, Disp: 28 tablet, Rfl: 2  Allergies   Allergen Reactions   • Ceclor [Cefaclor] Hives   • Nuts - Food Allergy GI Intolerance   • Latex Rash     Social History     Socioeconomic History   • Marital status: /Civil Union     Spouse name: None   • Number of children: 0   • Years of education: None   • Highest education level: Associate degree: academic program   Occupational History   • Occupation:      Employer: [employer]   Tobacco Use   • Smoking status: Every Day     Packs/day: 0 50     Types: Cigarettes   • Smokeless tobacco: Never   Vaping Use   • Vaping Use: Never used   Substance and Sexual Activity   • Alcohol use: Not Currently     Alcohol/week: 1 0 standard drink     Types: 1 Glasses of wine per week     Comment: once a month   • Drug use: No   • Sexual activity: None   Other Topics Concern   • None   Social History Narrative    ** Merged History Encounter **          Social Determinants of Health     Financial Resource Strain: Not on file   Food Insecurity: Not on file   Transportation Needs: Not on file   Physical Activity: Not on file   Stress: Not on file   Social Connections: Not on file   Intimate Partner Violence: Not on file   Housing Stability: Not on file     Family History   Problem Relation Age of Onset   • Suicide Attempts Father    • Self-Injury Father    • Alcohol abuse Father    • Depression Maternal Uncle    • Depression Paternal Uncle    • Alcohol abuse Brother    • Depression Brother    • Alcohol abuse Brother        Review of Systems   Constitutional: Negative for chills, diaphoresis, fatigue and fever  Respiratory: Negative for apnea, cough, chest tightness, shortness of breath and wheezing  Cardiovascular: Negative for chest pain, palpitations and leg swelling  Gastrointestinal: Negative for abdominal distention, abdominal pain, anal bleeding, constipation, diarrhea, nausea, rectal pain and vomiting  Genitourinary: Positive for menstrual problem  Negative for difficulty urinating, dyspareunia, dysuria, frequency, hematuria, pelvic pain, urgency, vaginal bleeding, vaginal discharge and vaginal pain  Musculoskeletal: Negative for arthralgias, back pain and myalgias  Skin: Negative for color change and rash  Neurological: Negative for dizziness, syncope, light-headedness, numbness and headaches  Hematological: Negative for adenopathy  Does not bruise/bleed easily  Psychiatric/Behavioral: Negative for dysphoric mood and sleep disturbance  The patient is not nervous/anxious          Objective   Physical Exam  OBGyn Exam     Objective      /80 (BP Location: Left arm, Patient Position: Sitting)   Wt 103 kg (226 lb 6 4 oz)   LMP 11/12/2022   BMI 37 10 kg/m²     General:   alert and oriented, in no acute distress   Neck:    Breast:    Heart:    Lungs:    Abdomen: soft, non-tender, without masses or organomegaly   Vulva: normal   Vagina: Without erythema or lesions or discharge  Normal   Cervix: Without lesions or discharge or cervicitis    No Cervical motion tenderness   Uterus: mid-position, non-tender, size consistent with 10-12 weeks   Adnexa: no mass, fullness, tenderness   Rectum: negative    Psych:  Normal mood and affect   Skin:  Without obvious lesions   Eyes: symmetric, with normal movements and reactivity   Musculoskeletal:  Normal muscle tone and movements appreciated

## 2022-12-02 LAB
HPV HR 12 DNA CVX QL NAA+PROBE: NEGATIVE
HPV16 DNA CVX QL NAA+PROBE: NEGATIVE
HPV18 DNA CVX QL NAA+PROBE: NEGATIVE

## 2022-12-09 LAB
LAB AP GYN PRIMARY INTERPRETATION: NORMAL
LAB AP LMP: NORMAL
Lab: NORMAL

## 2022-12-29 ENCOUNTER — PROCEDURE VISIT (OUTPATIENT)
Dept: GYNECOLOGY | Facility: CLINIC | Age: 37
End: 2022-12-29

## 2022-12-29 ENCOUNTER — ULTRASOUND (OUTPATIENT)
Dept: GYNECOLOGY | Facility: CLINIC | Age: 37
End: 2022-12-29

## 2022-12-29 VITALS
DIASTOLIC BLOOD PRESSURE: 74 MMHG | HEART RATE: 92 BPM | WEIGHT: 226 LBS | SYSTOLIC BLOOD PRESSURE: 136 MMHG | HEIGHT: 65 IN | BODY MASS INDEX: 37.65 KG/M2

## 2022-12-29 DIAGNOSIS — N84.0 ENDOMETRIAL POLYP: ICD-10-CM

## 2022-12-29 DIAGNOSIS — N92.1 MENORRHAGIA WITH IRREGULAR CYCLE: Primary | ICD-10-CM

## 2022-12-29 DIAGNOSIS — D25.1 INTRAMURAL LEIOMYOMA OF UTERUS: ICD-10-CM

## 2022-12-29 LAB — SL AMB POCT URINE HCG: NORMAL

## 2022-12-29 NOTE — PROGRESS NOTES
AMB US Pelvic Non OB    Date/Time: 12/29/2022 7:27 AM  Performed by: Beverley Rodrick  Authorized by: Joaquina Florez MD   Universal Protocol:  Patient identity confirmed: verbally with patient      Procedure details:     Technique:  Transvaginal US, Non-OB    Position: lithotomy exam    Uterine findings:     Length (cm): 13 17    Height (cm):  8 08    Width (cm):  9 03    Endometrial stripe: identified      Endometrium thickness (mm):  10 6  Left ovary findings:     Left ovary:  Visualized    Length (cm): 2 5    Height (cm): 1 4    Width (cm): 1 58  Right ovary findings:     Right ovary:  Visualized    Length (cm): 2 84    Height (cm): 1 59    Width (cm): 1 67  Other findings:     Free pelvic fluid: not identified      Free peritoneal fluid: not identified    Post-Procedure Details:     Impression:  Anteverted uterus demonstrates an anterior fundal fibroid, 8 6cm (8 2cm on outside exam performed recently at [facility])  Bilateral ovaries appear within normal limits where seen  Visualization is limited due to shadowing from fibroid  No free fluid  Tolerance: Tolerated well, no immediate complications    Complications: no complications    Additional Procedure Comments:      zlien F8 E8C-RS transvaginal transducer Serial # [id number] was used to perform the examination today and subsequently followed with high level disinfection utilizing Trophon EPR procedure  Ultrasound performed at:     [address] [address]  Phone:  [phone]  Fax:  [phone]  Sonohysterogram    Date/Time: 12/29/2022 7:27 AM  Performed by: Joaquina Florez MD  Authorized by: Joaquina Florez MD   Universal Protocol:  Patient identity confirmed: verbally with patient      Pre-procedure:     Prepped with: Hibiclens    Procedure:     Cervix cleaned and prepped: yes      Uterus sounded: yes      Catheter inserted: yes      Uterine cavity distended with saline: yes    Post-procedure:     Patient observed: yes      Post procedure instructions given to patient: yes      Patient tolerated procedure well with no complications: yes    Comments:      Sonohysterogram demonstrates irregularity along the anterior endometrium, most likely a polyp     Endometrial biopsy    Date/Time: 12/29/2022 7:28 AM  Performed by: Migue Underwood MD  Authorized by: Migue Underwood MD   Universal Protocol:  Patient identity confirmed: verbally with patient      Procedure:     Procedure: endometrial biopsy with Pipelle      A bivalve speculum was placed in the vagina: yes      Cervix cleaned and prepped: yes      Specimen collected: specimen collected and sent to pathology      Patient tolerated procedure well with no complications: yes

## 2022-12-29 NOTE — PROGRESS NOTES
Assessment/Plan   Diagnoses and all orders for this visit:    Menorrhagia with irregular cycle    Intramural leiomyoma of uterus    Endometrial polyp    1  menorrhagia with irregular menses-has much better cycle control on minipill  Sonohysterogram today demonstrated fibroid uterus with probable polyp the fundus of the uterus  Endometrial biopsy was done and we will inform the patient of the findings  Patient and her partner were counseled in detail about the findings  Recommend consider hysteroscopy with D&C with endometrial polypectomy  Also discussed endometrial ablation  Risks and benefits of the procedure were discussed in detail and all questions were answered  Consent was signed, and the patient was scheduled for the surgery in the near future  She is still considering the ablation and she will let me know going forward  2  uterine fibroid- noted today to be 8 6 cm, anterior fundal   No submucosal extension was noted at the time of sonohysterogram   3  PMDD-does note significant psychiatric history  She has history also notable for major depression disorder without psychotic features, general anxiety disorder, personality disorder, and PTSD  Fortunately, she has no significant sequelae from the minipill which has really helped her bleeding  She will continue with this  4  contraception-continue minipill  5  smoker-urged to quit previously  Would avoid estrogen-containing contraception given this  6  prior right adnexal tenderness-not noted on exam today  Ultrasound was normal adnexa  7  other-mother with history of fibroids, status post hysterectomy in her 46s  Follow-up 2 to 3 weeks post procedure or as needed  Subjective   Patient ID: Gianluca Carbajal is a 42 y o  female  Vitals:    12/29/22 1300   BP: 136/74   Pulse: 92     Patient was seen today for sonohysterogram with office visit with me  Please see assessment plan for details        The following portions of the patient's history were reviewed and updated as appropriate: allergies, current medications, past family history, past medical history, past social history, past surgical history and problem list   Past Medical History:   Diagnosis Date   • Anxiety    • Borderline personality disorder ([facility] [address] )    • Depression    • Psychiatric illness    • Self-injurious behavior    • [facility]      No past surgical history on file    OB History    Para Term  AB Living   1 0     1     SAB IAB Ectopic Multiple Live Births   1              # Outcome Date GA Lbr Arben/2nd Weight Sex Delivery Anes PTL Lv   1 SAB                Current Outpatient Medications:   •  albuterol (PROVENTIL HFA,VENTOLIN HFA) 90 mcg/act inhaler, Inhale 2 puffs every 4 (four) hours as needed for wheezing (Patient not taking: No sig reported), Disp: 8 g, Rfl: 0  •  busPIRone (BUSPAR) 30 MG tablet, Take 1 tablet (30 mg total) by mouth 2 (two) times a day, Disp: 60 tablet, Rfl: 2  •  cyanocobalamin (VITAMIN B-12) 100 mcg tablet, Take 1 tablet by mouth every 24 hours, Disp: , Rfl:   •  Cyanocobalamin 100 MCG LOZG, every 24 hours, Disp: , Rfl:   •  escitalopram (LEXAPRO) 20 mg tablet, Take 1 tablet (20 mg total) by mouth daily, Disp: 30 tablet, Rfl: 2  •  eszopiclone (LUNESTA) 2 mg tablet, every 24 hours, Disp: , Rfl:   •  Jencycla 0 35 MG tablet, Take 1 tablet by mouth daily, Disp: , Rfl:   •  LORazepam (Ativan) 0 5 mg tablet, Take 1 tablet (0 5 mg total) by mouth 2 (two) times a day as needed for anxiety, Disp: 30 tablet, Rfl: 1  •  norethindrone (MICRONOR) 0 35 MG tablet, every 24 hours, Disp: , Rfl:   •  QUEtiapine (SEROquel XR) 200 mg 24 hr tablet, Take 1 tablet (200 mg total) by mouth daily at bedtime, Disp: 30 tablet, Rfl: 2  •  QUEtiapine (SEROquel XR) 50 mg, Take 2 tablets (100 mg total) by mouth every evening for 14 days Around every 2 weeks before menstrual period, Disp: 28 tablet, Rfl: 2  Allergies   Allergen Reactions   • Ceclor [Cefaclor] Hives • Nuts - Food Allergy GI Intolerance   • Latex Rash     Social History     Socioeconomic History   • Marital status: /Civil Union     Spouse name: None   • Number of children: 0   • Years of education: None   • Highest education level: Associate degree: academic program   Occupational History   • Occupation:      Employer: [employer]   Tobacco Use   • Smoking status: Every Day     Packs/day: 0 50     Types: Cigarettes   • Smokeless tobacco: Never   Vaping Use   • Vaping Use: Never used   Substance and Sexual Activity   • Alcohol use: Not Currently     Alcohol/week: 1 0 standard drink     Types: 1 Glasses of wine per week     Comment: once a month   • Drug use: No   • Sexual activity: None   Other Topics Concern   • None   Social History Narrative    ** Merged History Encounter **          Social Determinants of Health     Financial Resource Strain: Not on file   Food Insecurity: Not on file   Transportation Needs: Not on file   Physical Activity: Not on file   Stress: Not on file   Social Connections: Not on file   Intimate Partner Violence: Not on file   Housing Stability: Not on file     Family History   Problem Relation Age of Onset   • Suicide Attempts Father    • Self-Injury Father    • Alcohol abuse Father    • Depression Maternal Uncle    • Depression Paternal Uncle    • Alcohol abuse Brother    • Depression Brother    • Alcohol abuse Brother        Review of Systems   Constitutional: Negative for chills, diaphoresis, fatigue and fever  Respiratory: Negative for apnea, cough, chest tightness, shortness of breath and wheezing  Cardiovascular: Negative for chest pain, palpitations and leg swelling  Gastrointestinal: Negative for abdominal distention, abdominal pain, anal bleeding, constipation, diarrhea, nausea, rectal pain and vomiting  Genitourinary: Positive for menstrual problem   Negative for difficulty urinating, dyspareunia, dysuria, frequency, hematuria, pelvic pain, urgency, vaginal bleeding, vaginal discharge and vaginal pain  Musculoskeletal: Negative for arthralgias, back pain and myalgias  Skin: Negative for color change and rash  Neurological: Negative for dizziness, syncope, light-headedness, numbness and headaches  Hematological: Negative for adenopathy  Does not bruise/bleed easily  Psychiatric/Behavioral: Negative for dysphoric mood and sleep disturbance  The patient is not nervous/anxious  Objective   Physical Exam  OBGyn Exam     Objective      /74 (BP Location: Left arm, Patient Position: Sitting, Cuff Size: Standard)   Pulse 92   Ht 5' 5" (1 651 m)   Wt 103 kg (226 lb)   LMP 12/04/2022   BMI 37 61 kg/m²     General:   alert and oriented, in no acute distress   Neck:    Breast:    Heart:  Regular rate and rhythm   Lungs:  Clear to auscultation   Abdomen: soft, non-tender, without masses or organomegaly   Vulva: normal   Vagina: Without erythema or lesions or discharge  Normal   Cervix: Without lesions or discharge or cervicitis    No Cervical motion tenderness   Uterus: mid-position, non-tender, size consistent with 10-12 weeks   Adnexa: no mass, fullness, tenderness   Rectum: deferred    Psych:  Normal mood and affect   Skin:  Without obvious lesions   Eyes: symmetric, with normal movements and reactivity   Musculoskeletal:  Normal muscle tone and movements appreciated

## 2022-12-30 ENCOUNTER — PREP FOR PROCEDURE (OUTPATIENT)
Dept: GYNECOLOGY | Facility: CLINIC | Age: 37
End: 2022-12-30

## 2022-12-30 DIAGNOSIS — Z01.818 PRE-OP TESTING: Primary | ICD-10-CM

## 2022-12-30 DIAGNOSIS — N92.6 IRREGULAR UTERINE BLEEDING: ICD-10-CM

## 2022-12-30 DIAGNOSIS — D25.0 SUBMUCOUS LEIOMYOMA OF UTERUS: ICD-10-CM

## 2022-12-30 DIAGNOSIS — N84.0 ENDOMETRIAL POLYP: ICD-10-CM

## 2023-01-01 DIAGNOSIS — F33.2 SEVERE EPISODE OF RECURRENT MAJOR DEPRESSIVE DISORDER, WITHOUT PSYCHOTIC FEATURES (HCC): ICD-10-CM

## 2023-01-01 DIAGNOSIS — F41.1 GENERALIZED ANXIETY DISORDER: ICD-10-CM

## 2023-01-04 RX ORDER — QUETIAPINE FUMARATE 50 MG/1
TABLET, EXTENDED RELEASE ORAL
Qty: 28 TABLET | Refills: 2 | Status: SHIPPED | OUTPATIENT
Start: 2023-01-04

## 2023-01-11 ENCOUNTER — TELEPHONE (OUTPATIENT)
Dept: GYNECOLOGY | Facility: CLINIC | Age: 38
End: 2023-01-11

## 2023-01-11 NOTE — TELEPHONE ENCOUNTER
Per Anna Shearer at [facility] on 1/11/2023 at 1:26 pm Cpt code 17449, 24509 is approved  This approval is good from 1/31/2023-7/11/2023      Ref# [id number]

## 2023-01-24 NOTE — PRE-PROCEDURE INSTRUCTIONS
Pre-Surgery Instructions:   Medication Instructions   • busPIRone (BUSPAR) 30 MG tablet Take day of surgery  • escitalopram (LEXAPRO) 20 mg tablet Take day of surgery  • Jencycla 0 35 MG tablet Take day of surgery  • LORazepam (Ativan) 0 5 mg tablet Uses PRN- OK to take day of surgery   • Multiple Vitamins-Minerals (QC WOMENS DAILY MULTIVITAMIN PO) Stop taking 7 days prior to surgery  • Probiotic Product (PROBIOTIC PO) Stop taking 7 days prior to surgery  • QUEtiapine (SEROquel XR) 200 mg 24 hr tablet Take night before surgery   • QUEtiapine (SEROquel XR) 50 mg Hold day of surgery  Have you had / have a sore throat? No  Have you had / have a cough less than 1 week? No  Have you had / have a fever greater than 100 0 - 100  4? No  Are you experiencing any shortness of breath? No    Review with patient via phone medications and showering instructions  Instructed to avoid all ASA and OTC Vit/Supp 1 week prior to surgery and to avoid NSAIDs 3 days prior to surgery per anesthesia instructions  Tylenol ok to take prn  Advised ASC call with surgery schedule time, nothing eat or drink after midnight  Verbalized understanding  Advise Smoking Cessation Education not interested

## 2023-01-27 ENCOUNTER — ANESTHESIA EVENT (OUTPATIENT)
Dept: PERIOP | Facility: HOSPITAL | Age: 38
End: 2023-01-27

## 2023-01-27 NOTE — H&P
Assessment/Plan   PAUL is a 78-year-old woman with menometrorrhagia, endometrial polyp and uterine fibroid who presents for hysteroscopy with D&C and endometrial polypectomy with possible endometrial ablation  Subjective   Patient ID: Barry Hall is a 42 y o  female  There were no vitals filed for this visit  SUZIE MILLER has been followed at [facility] for some time now  She presented on 11/30/2022 with fibroid uterus, being found on ultrasound from 11/10/2022 ordered by the primary doctor  Uterus was 12 0 x 6 5 x 7 1 cm with intramural fundal myoma 7 8 x 8 2 x 6 4 cm  Uterus is 10 to 12 weeks size on bimanual exam   Sonohysterogram was done on 12/29/2022 with fibroid uterus with probable polyp at the fundus of the uterus  The 8 6 cm myoma was noted anterior fundal, without significant impingement into the endometrial cavity noted  Endometrial biopsy demonstrated benign inactive to weakly proliferative phase endometrium without hyperplasia, atypia, or malignancy  Patient was counseled in detail about the findings  To proceed with hysteroscopy, D&C, with endometrial polypectomy with possible endometrial ablation  Risks and benefits of the procedure were discussed at the time of office visit and all questions were answered  Consent was signed at that time  Patient presents for surgery  Notable medical/GYN history-uterine fibroid 8 6 cm fundal anteriorly submucosal extension at sonohysterogram   PMDD-significant psychiatric history including major depression without psychotic features, general anxiety disorder, personality disorder, and PTSD  Fortunately, no emotional sequelae from minipill  Minipill for contraception  Smoker  History of right adnexal tenderness without adnexal findings at ultrasound          The following portions of the patient's history were reviewed and updated as appropriate: allergies, current medications, past family history, past medical history, past social history, past surgical history and problem list   Past Medical History:   Diagnosis Date   • Anxiety    • Asthma    • Borderline personality disorder ([facility] [address] )    • Depression    • Psychiatric illness    • Self-injurious behavior    • Suicide attempt [facility])      Past Surgical History:   Procedure Laterality Date   • INDUCED        OB History    Para Term  AB Living   1 0     1     SAB IAB Ectopic Multiple Live Births   1              # Outcome Date GA Lbr Arben/2nd Weight Sex Delivery Anes PTL Lv   1 SAB              No current facility-administered medications for this encounter      Current Outpatient Medications:   •  busPIRone (BUSPAR) 30 MG tablet, Take 1 tablet (30 mg total) by mouth 2 (two) times a day, Disp: 60 tablet, Rfl: 2  •  escitalopram (LEXAPRO) 20 mg tablet, Take 1 tablet (20 mg total) by mouth daily, Disp: 30 tablet, Rfl: 2  •  Jencycla 0 35 MG tablet, Take 1 tablet by mouth daily, Disp: , Rfl:   •  LORazepam (Ativan) 0 5 mg tablet, Take 1 tablet (0 5 mg total) by mouth 2 (two) times a day as needed for anxiety, Disp: 30 tablet, Rfl: 1  •  Multiple Vitamins-Minerals (QC WOMENS DAILY MULTIVITAMIN PO), Take by mouth, Disp: , Rfl:   •  Probiotic Product (PROBIOTIC PO), Take by mouth daily after breakfast, Disp: , Rfl:   •  QUEtiapine (SEROquel XR) 200 mg 24 hr tablet, Take 1 tablet (200 mg total) by mouth daily at bedtime, Disp: 30 tablet, Rfl: 2  •  QUEtiapine (SEROquel XR) 50 mg, TAKE 2 TABLETS BY MOUTH EVERY EVENING FOR 14 DAYS AROUND EVERY 2 WEEKS BEFORE MENSTRUAL PERIOD, Disp: 28 tablet, Rfl: 2  Allergies   Allergen Reactions   • Ceclor [Cefaclor] Hives   • Nuts - Food Allergy GI Intolerance   • Latex Rash     Social History     Socioeconomic History   • Marital status: /Civil Union     Spouse name: None   • Number of children: 0   • Years of education: None   • Highest education level: Associate degree: academic program   Occupational History   • Occupation: Assistant Manager     Employer: [employer]   Tobacco Use   • Smoking status: Every Day     Packs/day: 0 50     Types: Cigarettes   • Smokeless tobacco: Never   Vaping Use   • Vaping Use: Never used   Substance and Sexual Activity   • Alcohol use: Not Currently     Alcohol/week: 1 0 standard drink     Types: 1 Glasses of wine per week     Comment: once a month   • Drug use: Never   • Sexual activity: Yes   Other Topics Concern   • None   Social History Narrative    ** Merged History Encounter **          Social Determinants of Health     Financial Resource Strain: Not on file   Food Insecurity: Not on file   Transportation Needs: Not on file   Physical Activity: Not on file   Stress: Not on file   Social Connections: Not on file   Intimate Partner Violence: Not on file   Housing Stability: Not on file     Family History   Problem Relation Age of Onset   • Suicide Attempts Father    • Self-Injury Father    • Alcohol abuse Father    • Depression Maternal Uncle    • Depression Paternal Uncle    • Alcohol abuse Brother    • Depression Brother    • Alcohol abuse Brother        Review of Systems    Objective   Physical Exam  OBGyn Exam     Objective      There were no vitals taken for this visit  General:   alert and oriented, in no acute distress   Neck:    Breast:    Heart: regular rate and rhythm, S1, S2 normal, no murmur, click, rub or gallop   Lungs: clear to auscultation bilaterally   Abdomen: soft, non-tender, without masses or organomegaly   Vulva: normal   Vagina: Without erythema or lesions or discharge  Normal   Cervix: Without lesions or discharge or cervicitis    No Cervical motion tenderness   Uterus: mid-position, non-tender, size consistent with 10-12 weeks   Adnexa: no mass, fullness, tenderness   Rectum: deferred    Psych:  Normal mood and affect   Skin:  Without obvious lesions   Eyes: symmetric, with normal movements and reactivity   Musculoskeletal:  Normal muscle tone and movements appreciated

## 2023-01-30 PROBLEM — Z98.890 S/P DILATION AND CURETTAGE: Status: ACTIVE | Noted: 2023-01-30

## 2023-01-31 ENCOUNTER — ANESTHESIA (OUTPATIENT)
Dept: PERIOP | Facility: HOSPITAL | Age: 38
End: 2023-01-31

## 2023-01-31 ENCOUNTER — HOSPITAL ENCOUNTER (OUTPATIENT)
Facility: HOSPITAL | Age: 38
Setting detail: OUTPATIENT SURGERY
Discharge: HOME | End: 2023-01-31
Attending: OBSTETRICS & GYNECOLOGY | Admitting: OBSTETRICS & GYNECOLOGY

## 2023-01-31 VITALS
HEIGHT: 65 IN | HEART RATE: 71 BPM | OXYGEN SATURATION: 95 % | SYSTOLIC BLOOD PRESSURE: 110 MMHG | WEIGHT: 233.25 LBS | BODY MASS INDEX: 38.86 KG/M2 | TEMPERATURE: 97.8 F | RESPIRATION RATE: 16 BRPM | DIASTOLIC BLOOD PRESSURE: 55 MMHG

## 2023-01-31 DIAGNOSIS — N92.6 IRREGULAR UTERINE BLEEDING: ICD-10-CM

## 2023-01-31 DIAGNOSIS — D25.0 SUBMUCOUS LEIOMYOMA OF UTERUS: ICD-10-CM

## 2023-01-31 DIAGNOSIS — N84.0 ENDOMETRIAL POLYP: ICD-10-CM

## 2023-01-31 LAB
EXT PREGNANCY TEST URINE: NEGATIVE
EXT. CONTROL: NORMAL

## 2023-01-31 RX ORDER — MIDAZOLAM HYDROCHLORIDE 2 MG/2ML
INJECTION, SOLUTION INTRAMUSCULAR; INTRAVENOUS AS NEEDED
Status: DISCONTINUED | OUTPATIENT
Start: 2023-01-31 | End: 2023-01-31

## 2023-01-31 RX ORDER — SODIUM CHLORIDE 9 MG/ML
125 INJECTION, SOLUTION INTRAVENOUS CONTINUOUS
Status: DISCONTINUED | OUTPATIENT
Start: 2023-01-31 | End: 2023-01-31 | Stop reason: HOSPADM

## 2023-01-31 RX ORDER — ONDANSETRON 2 MG/ML
4 INJECTION INTRAMUSCULAR; INTRAVENOUS ONCE AS NEEDED
Status: DISCONTINUED | OUTPATIENT
Start: 2023-01-31 | End: 2023-01-31 | Stop reason: HOSPADM

## 2023-01-31 RX ORDER — FENTANYL CITRATE 50 UG/ML
INJECTION, SOLUTION INTRAMUSCULAR; INTRAVENOUS AS NEEDED
Status: DISCONTINUED | OUTPATIENT
Start: 2023-01-31 | End: 2023-01-31

## 2023-01-31 RX ORDER — DEXAMETHASONE SODIUM PHOSPHATE 10 MG/ML
INJECTION, SOLUTION INTRAMUSCULAR; INTRAVENOUS AS NEEDED
Status: DISCONTINUED | OUTPATIENT
Start: 2023-01-31 | End: 2023-01-31

## 2023-01-31 RX ORDER — MAGNESIUM HYDROXIDE 1200 MG/15ML
LIQUID ORAL AS NEEDED
Status: DISCONTINUED | OUTPATIENT
Start: 2023-01-31 | End: 2023-01-31 | Stop reason: HOSPADM

## 2023-01-31 RX ORDER — ONDANSETRON 2 MG/ML
INJECTION INTRAMUSCULAR; INTRAVENOUS AS NEEDED
Status: DISCONTINUED | OUTPATIENT
Start: 2023-01-31 | End: 2023-01-31

## 2023-01-31 RX ORDER — IBUPROFEN 600 MG/1
600 TABLET ORAL EVERY 6 HOURS PRN
Status: DISCONTINUED | OUTPATIENT
Start: 2023-01-31 | End: 2023-01-31 | Stop reason: HOSPADM

## 2023-01-31 RX ORDER — ACETAMINOPHEN 325 MG/1
975 TABLET ORAL EVERY 6 HOURS PRN
Status: DISCONTINUED | OUTPATIENT
Start: 2023-01-31 | End: 2023-01-31 | Stop reason: HOSPADM

## 2023-01-31 RX ORDER — KETOROLAC TROMETHAMINE 30 MG/ML
INJECTION, SOLUTION INTRAMUSCULAR; INTRAVENOUS AS NEEDED
Status: DISCONTINUED | OUTPATIENT
Start: 2023-01-31 | End: 2023-01-31

## 2023-01-31 RX ORDER — ONDANSETRON 2 MG/ML
4 INJECTION INTRAMUSCULAR; INTRAVENOUS EVERY 6 HOURS PRN
Status: DISCONTINUED | OUTPATIENT
Start: 2023-01-31 | End: 2023-01-31 | Stop reason: HOSPADM

## 2023-01-31 RX ORDER — FENTANYL CITRATE/PF 50 MCG/ML
50 SYRINGE (ML) INJECTION
Status: DISCONTINUED | OUTPATIENT
Start: 2023-01-31 | End: 2023-01-31 | Stop reason: HOSPADM

## 2023-01-31 RX ORDER — PROPOFOL 10 MG/ML
INJECTION, EMULSION INTRAVENOUS AS NEEDED
Status: DISCONTINUED | OUTPATIENT
Start: 2023-01-31 | End: 2023-01-31

## 2023-01-31 RX ORDER — LIDOCAINE HYDROCHLORIDE 20 MG/ML
INJECTION, SOLUTION EPIDURAL; INFILTRATION; INTRACAUDAL; PERINEURAL AS NEEDED
Status: DISCONTINUED | OUTPATIENT
Start: 2023-01-31 | End: 2023-01-31

## 2023-01-31 RX ADMIN — FENTANYL CITRATE 25 MCG: 50 INJECTION INTRAMUSCULAR; INTRAVENOUS at 09:37

## 2023-01-31 RX ADMIN — PROPOFOL 200 MG: 10 INJECTION, EMULSION INTRAVENOUS at 08:36

## 2023-01-31 RX ADMIN — FENTANYL CITRATE 50 MCG: 50 INJECTION INTRAMUSCULAR; INTRAVENOUS at 09:05

## 2023-01-31 RX ADMIN — KETOROLAC TROMETHAMINE 30 MG: 30 INJECTION, SOLUTION INTRAMUSCULAR at 09:13

## 2023-01-31 RX ADMIN — DEXAMETHASONE SODIUM PHOSPHATE 10 MG: 10 INJECTION INTRAMUSCULAR; INTRAVENOUS at 08:39

## 2023-01-31 RX ADMIN — FENTANYL CITRATE 25 MCG: 50 INJECTION INTRAMUSCULAR; INTRAVENOUS at 08:53

## 2023-01-31 RX ADMIN — MIDAZOLAM 2 MG: 1 INJECTION INTRAMUSCULAR; INTRAVENOUS at 08:31

## 2023-01-31 RX ADMIN — LIDOCAINE HYDROCHLORIDE 40 MG: 20 INJECTION, SOLUTION EPIDURAL; INFILTRATION; INTRACAUDAL; PERINEURAL at 08:39

## 2023-01-31 RX ADMIN — LIDOCAINE HYDROCHLORIDE 80 MG: 20 INJECTION, SOLUTION EPIDURAL; INFILTRATION; INTRACAUDAL; PERINEURAL at 08:36

## 2023-01-31 RX ADMIN — SODIUM CHLORIDE: 0.9 INJECTION, SOLUTION INTRAVENOUS at 09:13

## 2023-01-31 RX ADMIN — FENTANYL CITRATE 50 MCG: 50 INJECTION INTRAMUSCULAR; INTRAVENOUS at 10:09

## 2023-01-31 RX ADMIN — SODIUM CHLORIDE 125 ML/HR: 0.9 INJECTION, SOLUTION INTRAVENOUS at 08:05

## 2023-01-31 RX ADMIN — ONDANSETRON 4 MG: 2 INJECTION INTRAMUSCULAR; INTRAVENOUS at 09:13

## 2023-01-31 NOTE — ANESTHESIA POSTPROCEDURE EVALUATION
Post-Op Assessment Note    CV Status:  Stable    Pain management: adequate     Mental Status:  Alert and awake   Hydration Status:  Euvolemic   PONV Controlled:  Controlled   Airway Patency:  Patent      Post Op Vitals Reviewed: Yes      Staff: Anesthesiologist         No notable events documented      /73 (01/31/23 0938)    Temp 97 7 °F (36 5 °C) (01/31/23 0938)    Pulse 68 (01/31/23 0938)   Resp 20 (01/31/23 0938)    SpO2 94 % (01/31/23 0938)

## 2023-01-31 NOTE — OP NOTE
OPERATIVE REPORT  PATIENT NAME: Saeed Galicia    :  1985  MRN: [id number]  Pt Location: [facility] OR ROOM 04    SURGERY DATE: 2023    Surgeon(s) and Role:     * Mena Lim MD - Primary     * David Ruiz MD - Assisting    Preop Diagnosis:  Irregular uterine bleeding [N92 6]  Endometrial polyp [N84 0]  Submucous leiomyoma of uterus [D25 0]    Post-Op Diagnosis Codes:     * Irregular uterine bleeding [N92 6]     * Endometrial polyp [N84 0]     * Submucous leiomyoma of uterus [D25 0]    Procedure(s):  (D&C) W/ HYSTEROSCOPY  EMC WITH POLYPECTOMY  (EUA)    Specimen(s):  ID Type Source Tests Collected by Time Destination   1 : EMC with polyp Tissue Endometrium TISSUE EXAM Mena Lim MD 2023 [id number]        Estimated Blood Loss:   Minimal    Drains:  None    Anesthesia Type:   General LMA    Operative Indications:  Irregular uterine bleeding [N92 6]  Endometrial polyp [N84 0]  Submucous leiomyoma of uterus [V61 2]    Complications:   None apparent    Operative Findings:   1  External genitalia grossly normal in appearance  No ulcerations, no lacerations, no lesions  2   Bimanual exam revealed anteverted uterus with normal contours and freely mobile  Palpable anterior fibroid  No adnexal masses palpated bilaterally  3   Vagina and cervix were grossly normal in appearance without any lacerations or lesions  4  Uterus sounded to 9 cm  5  Hysteroscopic examination revealed proliferative endometrial lining  Two polyps were noted at fundus measuring 5mm and 3mm  Bilateral ostia were visualized  The patient was taken to the operating room  General LMA anesthesia (LMA) was administered  Sequential compression devices were placed, and the patient was positioned on the OR table in the dorsal lithotomy position  All pressure points were padded, and a nancy hugger was placed to maintain control of core body temperature    A bimanual exam was performed, and the uterus was anteverted, approximately 10 weeks in size with palpable fibroid  No palpable adnexal masses  The patient was prepped and draped in the usual sterile fashion using chlorhexidine  A time out was performed to confirm correct patient and procedure  A straight catheter was introduced into the bladder, which was drained of 100 mL of clear yellow urine  A weighted speculum was inserted into the vagina, and a Bates retractor was used to visualize the anterior lip of the cervix, which was then grasped with a single toothed tenaculum  The uterus was sounded to 9 cm  The cervix was serially dilated to 16F using Hanks dilators for introduction of the hysteroscope  Hysteroscope was introduced under direct visualization using normal saline solution as the distention media  The Bates retractors was removed from the vagina  The hysteroscope was advanced to the uterine fundus, and the entire uterine cavity was inspected in a systematic manner  There was noted to be the above findings  Hysteroscopic graspers were inserted through the hysteroscope and used to remove the endometrial polyps  Hysteroscope was removed  Sharp curetting was performed, starting at the 12'oclock position and rotating a total of 360 degrees to cover all surfaces  Endometrial tissue was obtained and sent for pathology  The hysteroscope was then re-introduced under direct visualization  The entire uterine cavity was inspected in a systematic manner  Adequate curetting was confirmed, and the hysteroscope was withdrawn  The fluid deficit reached 150mL  The single toothed tenaculum was removed from the anterior lip of the cervix, and good hemostasis was confirmed  The weighted speculum was removed from the vagina  At the conclusion of the procedure, all needle, sponge, and instrument counts were correct x2  Dr Preeti Rice was present and participated in all key portions of the case        Patient Disposition:  PACU       SIGNATURE: David Ruiz MD  DATE: January 31, 2023  TIME: 9:38 AM

## 2023-01-31 NOTE — INTERVAL H&P NOTE
H&P reviewed  After examining the patient I find no changes in the patients condition since the H&P had been written      Vitals:    01/31/23 0728   BP: 122/58   Pulse: 73   Resp: 18   Temp: 98 4 °F (36 9 °C)   SpO2: 96%

## 2023-01-31 NOTE — ANESTHESIA PREPROCEDURE EVALUATION
Procedure:  (D&C) W/ HYSTEROSCOPY (Uterus)  psb resection of submucous myoma, psb endometrial ablation (Uterus)  (EUA) ([place])    Relevant Problems   NEURO/PSYCH   (+) Anxiety   (+) Depression   (+) Generalized anxiety disorder   (+) PMDD (premenstrual dysphoric disorder)   (+) PTSD (post-traumatic stress disorder)   (+) Severe episode of recurrent major depressive disorder, without psychotic features (HCC)      PULMONARY   (+) Asthma   (+) Cigarette smoker one half pack a day or less      Other   (+) Borderline personality disorder ([address] )   (+) Self-injurious behavior        Physical Exam    Airway    Mallampati score: II  TM Distance: >3 FB  Neck ROM: full     Dental       Cardiovascular  Rhythm: regular, Rate: normal, Cardiovascular exam normal    Pulmonary  Pulmonary exam normal Breath sounds clear to auscultation,     Other Findings        Anesthesia Plan  ASA Score- 2     Anesthesia Type- general with ASA Monitors  Additional Monitors:   Airway Plan: LMA  Plan Factors-    Chart reviewed  Existing labs reviewed  Patient summary reviewed  Patient is a current smoker  Patient instructed to abstain from smoking on day of procedure  Patient smoked on day of surgery  Obstructive sleep apnea risk education given perioperatively  Induction-     Postoperative Plan-     Informed Consent- Anesthetic plan and risks discussed with patient and spouse

## 2023-02-02 ENCOUNTER — TELEPHONE (OUTPATIENT)
Dept: GYNECOLOGY | Facility: CLINIC | Age: 38
End: 2023-02-02

## 2023-02-07 DIAGNOSIS — F41.1 GENERALIZED ANXIETY DISORDER: ICD-10-CM

## 2023-02-07 RX ORDER — BUSPIRONE HYDROCHLORIDE 30 MG/1
TABLET ORAL
Qty: 60 TABLET | Refills: 2 | Status: SHIPPED | OUTPATIENT
Start: 2023-02-07

## 2023-02-09 ENCOUNTER — TELEMEDICINE (OUTPATIENT)
Dept: PSYCHIATRY | Facility: CLINIC | Age: 38
End: 2023-02-09

## 2023-02-09 DIAGNOSIS — F60.3 BORDERLINE PERSONALITY DISORDER (HCC): ICD-10-CM

## 2023-02-09 DIAGNOSIS — F33.2 SEVERE EPISODE OF RECURRENT MAJOR DEPRESSIVE DISORDER, WITHOUT PSYCHOTIC FEATURES (HCC): ICD-10-CM

## 2023-02-09 DIAGNOSIS — F41.1 GENERALIZED ANXIETY DISORDER: ICD-10-CM

## 2023-02-09 RX ORDER — ESCITALOPRAM OXALATE 20 MG/1
20 TABLET ORAL DAILY
Qty: 30 TABLET | Refills: 2 | Status: SHIPPED | OUTPATIENT
Start: 2023-02-09

## 2023-02-09 RX ORDER — QUETIAPINE 200 MG/1
200 TABLET, FILM COATED, EXTENDED RELEASE ORAL
Qty: 30 TABLET | Refills: 2 | Status: SHIPPED | OUTPATIENT
Start: 2023-02-09 | End: 2023-05-10

## 2023-02-09 NOTE — PSYCH
Virtual Regular Visit    Verification of patient location:    Patient is located in the following state in which I hold an active license [place]      Assessment/Plan:    Problem List Items Addressed This Visit        Other    Severe episode of recurrent major depressive disorder, without psychotic features (HCC)    Relevant Medications    escitalopram (LEXAPRO) 20 mg tablet    QUEtiapine (SEROquel XR) 200 mg 24 hr tablet    Generalized anxiety disorder    Relevant Medications    escitalopram (LEXAPRO) 20 mg tablet    QUEtiapine (SEROquel XR) 200 mg 24 hr tablet    Borderline personality disorder (HCC)    Relevant Medications    escitalopram (LEXAPRO) 20 mg tablet    QUEtiapine (SEROquel XR) 200 mg 24 hr tablet       Goals addressed in session: Goal 1 and Goal 2          Reason for visit is   Chief Complaint   Patient presents with   • Virtual Regular Visit        Encounter provider Franc Gaitan MD    Provider located at [address] [id number]      Recent Visits  No visits were found meeting these conditions  Showing recent visits within past 7 days and meeting all other requirements  Today's Visits  Date Type Provider Dept   02/09/23 Telemedicine Franc Gaitan MD [facility] today's visits and meeting all other requirements  Future Appointments  No visits were found meeting these conditions  Showing future appointments within next 150 days and meeting all other requirements       The patient was identified by name and date of birth  Robertopam Mariaelena was informed that this is a [facility]medicine visit and [facility]  She agrees to proceed     My office door was closed  No one else was in the room  She acknowledged consent and understanding of privacy and security of the video platform   The patient has agreed to participate and understands they can discontinue the visit at any time  Patient is aware this is a billable service  Subjective  See below      HPI     Past Medical History:   Diagnosis Date   • Anxiety    • Asthma    • Borderline personality disorder ([address] )    • Depression    • Psychiatric illness    • Self-injurious behavior    • Suicide attempt [facility])        Past Surgical History:   Procedure Laterality Date   • EXAMINATION UNDER ANESTHESIA N/A 2023    Procedure: (EUA); Surgeon: Anthony Huizar MD;  Location: [facility];  Service: Gynecology   • INDUCED      • MS HYSTEROSCOPY BX ENDOMETRIUM&/POLYPC W/WO D&C N/A 2023    Procedure: (D&C) W/ HYSTEROSCOPY, EMC WITH POLYPECTOMY;  Surgeon: Anthony Huizar MD;  Location: [facility];  Service: Gynecology       Current Outpatient Medications   Medication Sig Dispense Refill   • escitalopram (LEXAPRO) 20 mg tablet Take 1 tablet (20 mg total) by mouth daily 30 tablet 2   • QUEtiapine (SEROquel XR) 200 mg 24 hr tablet Take 1 tablet (200 mg total) by mouth daily at bedtime 30 tablet 2   • busPIRone (BUSPAR) 30 MG tablet take 1 tablet by mouth twice a day 60 tablet 2   • Jencycla 0 35 MG tablet Take 1 tablet by mouth daily     • LORazepam (Ativan) 0 5 mg tablet Take 1 tablet (0 5 mg total) by mouth 2 (two) times a day as needed for anxiety 30 tablet 1   • Multiple Vitamins-Minerals (QC WOMENS DAILY MULTIVITAMIN PO) Take by mouth     • Probiotic Product (PROBIOTIC PO) Take by mouth daily after breakfast     • QUEtiapine (SEROquel XR) 50 mg TAKE 2 TABLETS BY MOUTH EVERY EVENING FOR 14 DAYS AROUND EVERY 2 WEEKS BEFORE MENSTRUAL PERIOD 28 tablet 2     No current facility-administered medications for this visit  Allergies   Allergen Reactions   • Ceclor [Cefaclor] Hives   • Nuts - Food Allergy GI Intolerance   • Latex Rash       Review of Systems    Video Exam    There were no vitals filed for this visit      Physical Exam     Visit Time    Visit Start Time: 2:33 PM  Visit Stop Time: 2:43 PM  Total Visit Duration: 10 minutes    MEDICATION MANAGEMENT NOTE        [address]      Name and Date of Birth:  Andrea Urbina 42 y o  1985 MRN: [id number]    Date of Visit: February 9, 2023    Reason for Visit: Follow-up for medication management    Subjective: The patient reports she has been doing very well last few months  Reports her mood has been very stable  Denies feeling depressed or anxious  Reports had follow-up with OB/GYN after her last visit with me and has been prescribed birth control pill which has been very effective in controlling her mood swings during her menstrual period  Reports might feel a little low or anxious for 1 to 2 days but has been manageable  Reports occasionally might take lorazepam if he feels very anxious  Reports appetite, sleep, energy level has been much better  Reports concentration has been good  Denies any suicidal thoughts or any urges to hurt herself  Denies any anger outbursts or mood swings  The patient also had completed [employer] couple of months back and felt it was very effective  has been also following with psychotherapist and undergoing DBT and reports it has been very effective  Feels current combination of medication, DBT and birth control pill has been very effective  Denies any concern  Has good support from her  and friends  Denies any side effects on the medication  Compliant with it  Reports taking Seroquel Xr 50 mg for a couple of weeks around her menstrual period but has been taking Seroquel  mg daily        Review Of Systems:      Constitutional negative   ENT negative   Cardiovascular negative   Respiratory negative   Gastrointestinal negative   Genitourinary negative   Musculoskeletal negative   Integumentary negative   Neurological negative   Endocrine negative   Other Symptoms none       Alcohol/Substance Abuse: Denies        Past Medical History:    Past Medical History:   Diagnosis Date   • Anxiety    • Asthma    • Borderline personality disorder ([facility] [address] )    • Depression    • Psychiatric illness    • Self-injurious behavior    • Suicide attempt [facility])         Past Surgical History:   Procedure Laterality Date   • EXAMINATION UNDER ANESTHESIA N/A 2023    Procedure: (EUA); Surgeon: Jalyn Alves MD;  [facility];  Service: Gynecology   • INDUCED      • [place] HYSTEROSCOPY BX ENDOMETRIUM&/POLYPC W/WO D&C N/A 2023    Procedure: (D&C) W/ HYSTEROSCOPY, EMC WITH POLYPECTOMY;  Surgeon: Jalyn Alves MD;  Location: [facility];  Service: Gynecology     Allergies   Allergen Reactions   • Ceclor [Cefaclor] Hives   • Nuts - Food Allergy GI Intolerance   • Latex Rash       Current Medications:       Current Outpatient Medications:   •  escitalopram (LEXAPRO) 20 mg tablet, Take 1 tablet (20 mg total) by mouth daily, Disp: 30 tablet, Rfl: 2  •  QUEtiapine (SEROquel XR) 200 mg 24 hr tablet, Take 1 tablet (200 mg total) by mouth daily at bedtime, Disp: 30 tablet, Rfl: 2  •  busPIRone (BUSPAR) 30 MG tablet, take 1 tablet by mouth twice a day, Disp: 60 tablet, Rfl: 2  •  Jencycla 0 35 MG tablet, Take 1 tablet by mouth daily, Disp: , Rfl:   •  LORazepam (Ativan) 0 5 mg tablet, Take 1 tablet (0 5 mg total) by mouth 2 (two) times a day as needed for anxiety, Disp: 30 tablet, Rfl: 1  •  Multiple Vitamins-Minerals (QC WOMENS DAILY MULTIVITAMIN PO), Take by mouth, Disp: , Rfl:   •  Probiotic Product (PROBIOTIC PO), Take by mouth daily after breakfast, Disp: , Rfl:   •  QUEtiapine (SEROquel XR) 50 mg, TAKE 2 TABLETS BY MOUTH EVERY EVENING FOR 14 DAYS AROUND EVERY 2 WEEKS BEFORE MENSTRUAL PERIOD, Disp: 28 tablet, Rfl: 2       History Review:  The following portions of the patient's history were reviewed and updated as appropriate: allergies, current medications, past family history, past medical history, past social history, past surgical history and problem list          OBJECTIVE:     Vital signs in last 24 hours: There were no vitals filed for this visit  Mental Status Evaluation:    Appearance age appropriate, casually dressed   Behavior cooperative, calm   Speech normal rate, normal volume, normal pitch   Mood normal   Affect normal range and intensity, appropriate   Thought Processes organized, goal directed   Associations intact associations   Thought Content no overt delusions   Perceptual Disturbances: no auditory hallucinations, no visual hallucinations   Abnormal Thoughts  Risk Potential Suicidal ideation - None  Homicidal ideation - None  Potential for aggression - No   Orientation oriented to person, place, time/date and situation   Memory recent and remote memory grossly intact   Consciousness alert and awake   Attention Span Concentration Span attention span and concentration are age appropriate   Intellect appears to be of average intelligence   Insight intact   Judgement intact   Muscle Strength and  Gait  unable to assess due  virtual visit   Motor activity  unable to assess due to virtual visit   Language no difficulty naming common objects, no difficulty repeating a phrase, no difficulty writing a sentence   Fund of Knowledge adequate knowledge of current events  adequate fund of knowledge regarding past history  adequate fund of knowledge regarding vocabulary    Pain none   Pain Scale 0       Laboratory Results: I have personally reviewed all pertinent laboratory/tests results  Assessment/Plan: Patient overall has been doing very well with depression significantly under control  No significant anxiety  Plan is to continue with the current medication with no changes  Patient educated about her medication again in detail and advised to call me if there is any concern and to call crisis or visit nearby ER in case of any emergency or any SI or HI  Patient verbalized understanding and agrees with the plan        Diagnoses and all orders for this visit:    Generalized anxiety disorder  -     escitalopram (LEXAPRO) 20 mg tablet; Take 1 tablet (20 mg total) by mouth daily    Severe episode of recurrent major depressive disorder, without psychotic features (HCC)  -     escitalopram (LEXAPRO) 20 mg tablet; Take 1 tablet (20 mg total) by mouth daily  -     QUEtiapine (SEROquel XR) 200 mg 24 hr tablet; Take 1 tablet (200 mg total) by mouth daily at bedtime    Borderline personality disorder (HCC)  -     QUEtiapine (SEROquel XR) 200 mg 24 hr tablet; Take 1 tablet (200 mg total) by mouth daily at bedtime          Treatment Recommendations/Precautions:      Aware of 24 hour and weekend coverage for urgent situations accessed by calling [facility] main practice number    Risks/Benefits      Risks, Benefits And Possible Side Effects Of Medications:    Risks, benefits, and possible side effects of medications explained to [facility] and she verbalizes understanding and agreement for treatment  Risks of medications in pregnancy explained to [facility]  She verbalizes understanding and agrees to notify her doctor if she becomes pregnant      Controlled Medication Discussion:     Not applicable    Psychotherapy Provided:     Individual psychotherapy provided: No     Treatment Plan;    Completed and signed during the session: Not applicable - Treatment Plan not due at this session    Esme Roche MD 02/09/23

## 2023-02-15 ENCOUNTER — OFFICE VISIT (OUTPATIENT)
Dept: GYNECOLOGY | Facility: CLINIC | Age: 38
End: 2023-02-15

## 2023-02-15 VITALS
HEIGHT: 65 IN | WEIGHT: 234 LBS | SYSTOLIC BLOOD PRESSURE: 118 MMHG | DIASTOLIC BLOOD PRESSURE: 74 MMHG | BODY MASS INDEX: 38.99 KG/M2

## 2023-02-15 DIAGNOSIS — D25.1 INTRAMURAL LEIOMYOMA OF UTERUS: ICD-10-CM

## 2023-02-15 DIAGNOSIS — Z12.31 VISIT FOR SCREENING MAMMOGRAM: ICD-10-CM

## 2023-02-15 DIAGNOSIS — N84.0 ENDOMETRIAL POLYP: ICD-10-CM

## 2023-02-15 DIAGNOSIS — Z30.41 ENCOUNTER FOR SURVEILLANCE OF CONTRACEPTIVE PILLS: ICD-10-CM

## 2023-02-15 DIAGNOSIS — N92.1 MENORRHAGIA WITH IRREGULAR CYCLE: Primary | ICD-10-CM

## 2023-02-15 RX ORDER — ACETAMINOPHEN AND CODEINE PHOSPHATE 120; 12 MG/5ML; MG/5ML
1 SOLUTION ORAL DAILY
Qty: 84 TABLET | Refills: 3 | Status: SHIPPED | OUTPATIENT
Start: 2023-02-15

## 2023-02-15 NOTE — PROGRESS NOTES
Assessment/Plan   Diagnoses and all orders for this visit:    Menorrhagia with irregular cycle    Encounter for surveillance of contraceptive pills  -     norethindrone (MICRONOR) 0 35 MG tablet; Take 1 tablet (0 35 mg total) by mouth daily    Visit for screening mammogram  -     Mammo screening bilateral w 3d & cad; Future    Intramural leiomyoma of uterus    Endometrial polyp    1  menorrhagia with irregular menses with endometrial polyp- much improved  Did have initial improvement with minipill which she started November 2022  Had [facility]  1/31/2023 with endometrial polyp done  Pathology report and intraoperative photos were shown to the patient after D&C, she had menses on 2/7/2023 which was very light  She was very happy with this  She will continue to monitor for recurrence of menometrorrhagia and will return if needed  2   Uterine fibroid-8 6 cm anterior fundal myoma noted on previous imaging  No submucosal extension was noted at the time of sonohysterogram nor hysteroscopy  She will call return with any issues  3   Contraception-continue minipill  Electronic prescription for 84-day prescription refill 3 was sent to local pharmacy  4  PMDD/psychiatric history- doing well on minipill  To follow-up with treating doctor regarding psychiatric history  5  smoker-previously urged to quit  Avoid estrogen-containing contraception at this time  6  prior history of adnexal tenderness- none noted on most recent exam   Ultrasound was with fibroid, normal adnexa  7  other-mother with history of fibroids-status post hysterectomy in her 46s  She had maternal great grandmother with breast cancer  She requested mammogram request given today and postdated until 5/21/2023 when she turns 34  Follow-up December 2023 for yearly exam or as needed  Subjective   Patient ID: Barry Hall is a 42 y o  female      Vitals:    02/15/23 1052   BP: 118/74     HPI    The following portions of the patient's history were reviewed and updated as appropriate: allergies, current medications, past family history, past medical history, past social history, past surgical history and problem list   Past Medical History:   Diagnosis Date   • Anxiety    • Asthma    • Borderline personality disorder ([facility] [address] )    • Depression    • Psychiatric illness    • Self-injurious behavior    • Suicide attempt [facility])      Past Surgical History:   Procedure Laterality Date   • EXAMINATION UNDER ANESTHESIA N/A 2023    Procedure: (EUA);   Surgeon: Summer Henriquez MD;  [facility];  Service: Gynecology   • INDUCED      • [place] HYSTEROSCOPY BX ENDOMETRIUM&/POLYPC W/WO D&C N/A 2023    Procedure: (D&C) W/ HYSTEROSCOPY, EMC WITH POLYPECTOMY;  Surgeon: Summer Henriquez MD;  [facility];  Service: Gynecology     OB History    Para Term  AB Living   1 0     1     SAB IAB Ectopic Multiple Live Births   1              # Outcome Date GA Lbr Arben/2nd Weight Sex Delivery Anes PTL Lv   1 SAB                Current Outpatient Medications:   •  busPIRone (BUSPAR) 30 MG tablet, take 1 tablet by mouth twice a day, Disp: 60 tablet, Rfl: 2  •  escitalopram (LEXAPRO) 20 mg tablet, Take 1 tablet (20 mg total) by mouth daily, Disp: 30 tablet, Rfl: 2  •  Multiple Vitamins-Minerals (QC WOMENS DAILY MULTIVITAMIN PO), Take by mouth, Disp: , Rfl:   •  norethindrone (MICRONOR) 0 35 MG tablet, Take 1 tablet (0 35 mg total) by mouth daily, Disp: 84 tablet, Rfl: 3  •  Probiotic Product (PROBIOTIC PO), Take by mouth daily after breakfast, Disp: , Rfl:   •  QUEtiapine (SEROquel XR) 200 mg 24 hr tablet, Take 1 tablet (200 mg total) by mouth daily at bedtime, Disp: 30 tablet, Rfl: 2  •  QUEtiapine (SEROquel XR) 50 mg, TAKE 2 TABLETS BY MOUTH EVERY EVENING FOR 14 DAYS AROUND EVERY 2 WEEKS BEFORE MENSTRUAL PERIOD, Disp: 28 tablet, Rfl: 2  •  LORazepam (Ativan) 0 5 mg tablet, Take 1 tablet (0 5 mg total) by mouth 2 (two) times a day as needed for anxiety, Disp: 30 tablet, Rfl: 1  Allergies   Allergen Reactions   • Ceclor [Cefaclor] Hives   • Nuts - Food Allergy GI Intolerance   • Latex Rash     Social History     Socioeconomic History   • Marital status: /Civil Union     Spouse name: None   • Number of children: 0   • Years of education: None   • Highest education level: Associate degree: academic program   Occupational History   • Occupation:      Employer: [employer]   Tobacco Use   • Smoking status: Every Day     Packs/day: 0 50     Types: Cigarettes   • Smokeless tobacco: Never   • Tobacco comments:     Last cigarette 1/30 1830   Vaping Use   • Vaping Use: Never used   Substance and Sexual Activity   • Alcohol use: Not Currently     Alcohol/week: 1 0 standard drink     Types: 1 Glasses of wine per week     Comment: once a month   • Drug use: Never   • Sexual activity: Yes   Other Topics Concern   • None   Social History Narrative    ** Merged History Encounter **          Social Determinants of Health     Financial Resource Strain: Not on file   Food Insecurity: Not on file   Transportation Needs: Not on file   Physical Activity: Not on file   Stress: Not on file   Social Connections: Not on file   Intimate Partner Violence: Not on file   Housing Stability: Not on file     Family History   Problem Relation Age of Onset   • Suicide Attempts Father    • Self-Injury Father    • Alcohol abuse Father    • Depression Maternal Uncle    • Depression Paternal Uncle    • Alcohol abuse Brother    • Depression Brother    • Alcohol abuse Brother        Review of Systems    Objective   Physical Exam  OBGyn Exam     Objective      /74 (BP Location: Right arm, Patient Position: Sitting)   Ht 5' 5" (1 651 m)   Wt 106 kg (234 lb)   LMP 02/07/2023   BMI 38 94 kg/m²     General:   alert and oriented, in no acute distress   Neck:    Breast:    Heart:    Lungs:    Abdomen:  Nontender   Vulva:    Vagina:    Cervix:    Uterus:    Adnexa:    Rectum: Psych:  Normal mood and affect   Skin:  Without obvious lesions   Eyes: symmetric, with normal movements and reactivity   Musculoskeletal:  Normal muscle tone and movements appreciated

## 2023-02-27 DIAGNOSIS — F41.1 GENERALIZED ANXIETY DISORDER: ICD-10-CM

## 2023-02-27 DIAGNOSIS — F33.2 SEVERE EPISODE OF RECURRENT MAJOR DEPRESSIVE DISORDER, WITHOUT PSYCHOTIC FEATURES (HCC): ICD-10-CM

## 2023-02-27 RX ORDER — QUETIAPINE FUMARATE 50 MG/1
TABLET, EXTENDED RELEASE ORAL
Qty: 28 TABLET | Refills: 2 | Status: SHIPPED | OUTPATIENT
Start: 2023-02-27

## 2023-04-30 DIAGNOSIS — F41.1 GENERALIZED ANXIETY DISORDER: ICD-10-CM

## 2023-04-30 DIAGNOSIS — F33.2 SEVERE EPISODE OF RECURRENT MAJOR DEPRESSIVE DISORDER, WITHOUT PSYCHOTIC FEATURES (HCC): ICD-10-CM

## 2023-04-30 RX ORDER — ESCITALOPRAM OXALATE 20 MG/1
TABLET ORAL
Qty: 30 TABLET | Refills: 2 | Status: SHIPPED | OUTPATIENT
Start: 2023-04-30

## 2023-05-02 DIAGNOSIS — F60.3 BORDERLINE PERSONALITY DISORDER (HCC): ICD-10-CM

## 2023-05-02 DIAGNOSIS — F33.2 SEVERE EPISODE OF RECURRENT MAJOR DEPRESSIVE DISORDER, WITHOUT PSYCHOTIC FEATURES (HCC): ICD-10-CM

## 2023-05-02 RX ORDER — QUETIAPINE 200 MG/1
TABLET, FILM COATED, EXTENDED RELEASE ORAL
Qty: 30 TABLET | Refills: 2 | Status: SHIPPED | OUTPATIENT
Start: 2023-05-02

## 2023-05-10 DIAGNOSIS — F41.1 GENERALIZED ANXIETY DISORDER: ICD-10-CM

## 2023-05-10 RX ORDER — BUSPIRONE HYDROCHLORIDE 30 MG/1
TABLET ORAL
Qty: 60 TABLET | Refills: 2 | Status: SHIPPED | OUTPATIENT
Start: 2023-05-10

## 2023-05-11 ENCOUNTER — TELEMEDICINE (OUTPATIENT)
Dept: PSYCHIATRY | Facility: CLINIC | Age: 38
End: 2023-05-11

## 2023-05-11 ENCOUNTER — HOSPITAL ENCOUNTER (EMERGENCY)
Facility: HOSPITAL | Age: 38
Discharge: HOME | End: 2023-05-13
Attending: EMERGENCY MEDICINE

## 2023-05-11 DIAGNOSIS — F33.2 SEVERE EPISODE OF RECURRENT MAJOR DEPRESSIVE DISORDER, WITHOUT PSYCHOTIC FEATURES (HCC): Primary | ICD-10-CM

## 2023-05-11 DIAGNOSIS — F41.1 GENERALIZED ANXIETY DISORDER: ICD-10-CM

## 2023-05-11 DIAGNOSIS — R45.851 SUICIDAL IDEATIONS: ICD-10-CM

## 2023-05-11 DIAGNOSIS — F60.3 BORDERLINE PERSONALITY DISORDER (HCC): ICD-10-CM

## 2023-05-11 DIAGNOSIS — F33.2 SEVERE EPISODE OF RECURRENT MAJOR DEPRESSIVE DISORDER, WITHOUT PSYCHOTIC FEATURES (HCC): ICD-10-CM

## 2023-05-11 LAB
AMPHETAMINES SERPL QL SCN: NEGATIVE
BACTERIA UR QL AUTO: ABNORMAL /HPF
BARBITURATES UR QL: NEGATIVE
BENZODIAZ UR QL: NEGATIVE
BILIRUB UR QL STRIP: NEGATIVE
CLARITY UR: CLEAR
COCAINE UR QL: NEGATIVE
COLOR UR: YELLOW
ETHANOL EXG-MCNC: 0 MG/DL
EXT PREGNANCY TEST URINE: NEGATIVE
EXT. CONTROL: NORMAL
GLUCOSE UR STRIP-MCNC: NEGATIVE MG/DL
HGB UR QL STRIP.AUTO: 25
KETONES UR STRIP-MCNC: NEGATIVE MG/DL
LEUKOCYTE ESTERASE UR QL STRIP: 25
METHADONE UR QL: NEGATIVE
MUCOUS THREADS UR QL AUTO: ABNORMAL
NITRITE UR QL STRIP: NEGATIVE
NON-SQ EPI CELLS URNS QL MICRO: ABNORMAL /HPF
OPIATES UR QL SCN: NEGATIVE
OXYCODONE+OXYMORPHONE UR QL SCN: NEGATIVE
PCP UR QL: NEGATIVE
PH UR STRIP.AUTO: 6 [PH]
PROT UR STRIP-MCNC: ABNORMAL MG/DL
RBC #/AREA URNS AUTO: ABNORMAL /HPF
SP GR UR STRIP.AUTO: 1.02 (ref 1–1.04)
THC UR QL: NEGATIVE
UROBILINOGEN UA: NEGATIVE MG/DL
WBC #/AREA URNS AUTO: ABNORMAL /HPF

## 2023-05-11 RX ORDER — QUETIAPINE FUMARATE 25 MG/1
25 TABLET, FILM COATED ORAL ONCE
Status: DISCONTINUED | OUTPATIENT
Start: 2023-05-11 | End: 2023-05-12

## 2023-05-11 RX ORDER — NICOTINE 21 MG/24HR
21 PATCH, TRANSDERMAL 24 HOURS TRANSDERMAL ONCE
Status: COMPLETED | OUTPATIENT
Start: 2023-05-11 | End: 2023-05-12

## 2023-05-11 RX ORDER — QUETIAPINE FUMARATE 100 MG/1
200 TABLET, FILM COATED ORAL ONCE
Status: COMPLETED | OUTPATIENT
Start: 2023-05-11 | End: 2023-05-11

## 2023-05-11 RX ORDER — BUSPIRONE HYDROCHLORIDE 10 MG/1
30 TABLET ORAL 2 TIMES DAILY
Status: DISCONTINUED | OUTPATIENT
Start: 2023-05-11 | End: 2023-05-13 | Stop reason: HOSPADM

## 2023-05-11 RX ORDER — ZOLPIDEM TARTRATE 5 MG/1
10 TABLET ORAL
Status: DISCONTINUED | OUTPATIENT
Start: 2023-05-11 | End: 2023-05-13 | Stop reason: HOSPADM

## 2023-05-11 RX ORDER — LORAZEPAM 0.5 MG/1
0.5 TABLET ORAL ONCE
Status: COMPLETED | OUTPATIENT
Start: 2023-05-11 | End: 2023-05-11

## 2023-05-11 RX ORDER — ESCITALOPRAM OXALATE 10 MG/1
20 TABLET ORAL DAILY
Status: DISCONTINUED | OUTPATIENT
Start: 2023-05-12 | End: 2023-05-13 | Stop reason: HOSPADM

## 2023-05-11 RX ORDER — LORAZEPAM 0.5 MG/1
0.5 TABLET ORAL 2 TIMES DAILY PRN
Status: DISCONTINUED | OUTPATIENT
Start: 2023-05-11 | End: 2023-05-13 | Stop reason: HOSPADM

## 2023-05-11 RX ADMIN — QUETIAPINE FUMARATE 200 MG: 100 TABLET ORAL at 20:26

## 2023-05-11 RX ADMIN — NICOTINE 21 MG: 21 PATCH, EXTENDED RELEASE TRANSDERMAL at 19:30

## 2023-05-11 RX ADMIN — ZOLPIDEM TARTRATE 10 MG: 5 TABLET ORAL at 20:56

## 2023-05-11 RX ADMIN — BUSPIRONE HYDROCHLORIDE 30 MG: 10 TABLET ORAL at 19:32

## 2023-05-11 RX ADMIN — LORAZEPAM 0.5 MG: 0.5 TABLET ORAL at 19:32

## 2023-05-11 NOTE — PSYCH
Virtual Regular Visit    Verification of patient location:    Patient is located at Home in the following state in which I hold an active license [place]      Assessment/Plan:    Problem List Items Addressed This Visit    None      Goals addressed in session: Goal 1          Reason for visit is   Chief Complaint   Patient presents with   • Virtual Regular Visit        Encounter provider Kade Whitley MD    Provider located at [address] [id number]      Recent Visits  No visits were found meeting these conditions  Showing recent visits within past 7 days and meeting all other requirements  Today's Visits  Date Type Provider Dept   05/11/23 Telemedicine Kade Whitley MD [facility] today's visits and meeting all other requirements  Future Appointments  No visits were found meeting these conditions  Showing future appointments within next 150 days and meeting all other requirements       The patient was identified by name and date of birth  Trish Epperson was informed that this is a telemedicine visit and that the visit is being conducted throughthe [facility]  She agrees to proceed     My office door was closed  No one else was in the room  She acknowledged consent and understanding of privacy and security of the video platform  The patient has agreed to participate and understands they can discontinue the visit at any time  Patient is aware this is a billable service  Subjective   see below    HPI     Past Medical History:   Diagnosis Date   • Anxiety    • Asthma    • Borderline personality disorder ([facility] [address] )    • Depression    • Psychiatric illness    • Self-injurious behavior    • Suicide attempt [facility])        Past Surgical History:   Procedure Laterality Date   • EXAMINATION UNDER ANESTHESIA N/A 1/31/2023    Procedure: (EUA);   Surgeon: Andie Bernal MD; [facility];  Service: Gynecology   • INDUCED      • [place] HYSTEROSCOPY BX ENDOMETRIUM&/POLYPC W/WO D&C N/A 2023    Procedure: (D&C) W/ HYSTEROSCOPY, EMC WITH POLYPECTOMY;  Surgeon: Shivam Luna MD;  [facility];  Service: Gynecology       Current Outpatient Medications   Medication Sig Dispense Refill   • busPIRone (BUSPAR) 30 MG tablet take 1 tablet by mouth twice a day 60 tablet 2   • escitalopram (LEXAPRO) 20 mg tablet take 1 tablet by mouth once daily 30 tablet 2   • LORazepam (Ativan) 0 5 mg tablet Take 1 tablet (0 5 mg total) by mouth 2 (two) times a day as needed for anxiety 30 tablet 1   • Multiple Vitamins-Minerals (QC WOMENS DAILY MULTIVITAMIN PO) Take by mouth     • norethindrone (MICRONOR) 0 35 MG tablet Take 1 tablet (0 35 mg total) by mouth daily 84 tablet 3   • Probiotic Product (PROBIOTIC PO) Take by mouth daily after breakfast     • QUEtiapine (SEROquel XR) 200 mg 24 hr tablet take 1 tablet by mouth at bedtime 30 tablet 2   • QUEtiapine (SEROquel XR) 50 mg TAKE 2 TABLETS BY MOUTH EVERY EVENING FOR 14 DAYS AROUND EVERY 2 WEEKS BEFORE MENSTRUAL PERIOD 28 tablet 2     No current facility-administered medications for this visit  Allergies   Allergen Reactions   • Ceclor [Cefaclor] Hives   • Nuts - Food Allergy GI Intolerance   • Latex Rash       Review of Systems    Video Exam    There were no vitals filed for this visit  Physical Exam     Visit Time    Visit Start Time: 1:02 PM  Visit Stop Time: 1:25 PM  Total Visit Duration: 23 minutes    MEDICATION MANAGEMENT NOTE        [facility]      Name and Date of Birth:  Waneta Severance 42 y o  1985 MRN: [id number]    Date of Visit: May 11, 2023    Reason for Visit:Follow-up for medication management  The patient was present along with her  Rahel Kwong  Subjective: The patient reports she has not been doing well for the last 1 month    Reports has been struggling with significant depression, and for last couple of days she also has been having active suicidal thoughts  She denies she acted on it so far  She report she had told her  and has been with her but at this time still continues struggle with active suicidal thoughts  Denied any plan but she feels she is spiraling down every day  She reports work has been stressful over the last 1 month  She reports she has no motivation, poor energy level, sleeping more, difficulty to get out of the bed in the morning, not taking care of her hygiene, poor appetite and concentration  Endorses anhedonia  Does not endorse any psychotic symptoms  Reports has been compliant with her medication but it has not been effective  The patient also is concerned about weight gain on Seroquel XR  I talked with the patient and her  about psychiatric inpatient admission given the patient is having severe depression along with active suicidal thoughts  They were both agreeable with it  The patient though was concerned that her current medications might be discontinued and will be started on totally new medication regimen  She also reported she had a terrible experience with her previous psychiatric admissions at [facility] in [place] and [facility]  Patient was recommended to consider going to [facility] ER and subsequently admitting to psychiatric inpatient unit there  They were comfortable with that option  Patient psychiatric medication trials includes [place] which she complained of feeling numb and not being effective, Depakote and Lamictal which she reported had side effects  Seroquel XR had been very effective but the patient concerned about weight gain  Patient also had [address] [place] last year which was very effective  Please check my past notes for more details and other medication trials      Review Of Systems:      Constitutional feeling tired and low energy   ENT negative   Cardiovascular negative   Respiratory negative   Gastrointestinal negative   Genitourinary negative   Musculoskeletal negative   Integumentary negative   Neurological negative   Endocrine negative   Other Symptoms none       Alcohol/Substance Abuse: Denies        Past Medical History:    Past Medical History:   Diagnosis Date   • Anxiety    • Asthma    • Borderline personality disorder ([facility] [address] )    • Depression    • Psychiatric illness    • Self-injurious behavior    • Suicide attempt [facility])         Past Surgical History:   Procedure Laterality Date   • EXAMINATION UNDER ANESTHESIA N/A 2023    Procedure: (EUA);   Surgeon: Leeann Cardona MD;  [facility];  Service: Gynecology   • INDUCED      • [place] HYSTEROSCOPY BX ENDOMETRIUM&/POLYPC W/WO D&C N/A 2023    Procedure: (D&C) W/ HYSTEROSCOPY, EMC WITH POLYPECTOMY;  Surgeon: Leeann Cardona MD;  [facility];  Service: Gynecology     Allergies   Allergen Reactions   • Ceclor [Cefaclor] Hives   • Nuts - Food Allergy GI Intolerance   • Latex Rash       Current Medications:       Current Outpatient Medications:   •  busPIRone (BUSPAR) 30 MG tablet, take 1 tablet by mouth twice a day, Disp: 60 tablet, Rfl: 2  •  escitalopram (LEXAPRO) 20 mg tablet, take 1 tablet by mouth once daily, Disp: 30 tablet, Rfl: 2  •  LORazepam (Ativan) 0 5 mg tablet, Take 1 tablet (0 5 mg total) by mouth 2 (two) times a day as needed for anxiety, Disp: 30 tablet, Rfl: 1  •  Multiple Vitamins-Minerals (QC WOMENS DAILY MULTIVITAMIN PO), Take by mouth, Disp: , Rfl:   •  norethindrone (MICRONOR) 0 35 MG tablet, Take 1 tablet (0 35 mg total) by mouth daily, Disp: 84 tablet, Rfl: 3  •  Probiotic Product (PROBIOTIC PO), Take by mouth daily after breakfast, Disp: , Rfl:   •  QUEtiapine (SEROquel XR) 200 mg 24 hr tablet, take 1 tablet by mouth at bedtime, Disp: 30 tablet, Rfl: 2  •  QUEtiapine (SEROquel XR) 50 mg, TAKE 2 TABLETS BY MOUTH EVERY EVENING FOR 14 DAYS AROUND EVERY 2 WEEKS BEFORE MENSTRUAL PERIOD, Disp: 28 tablet, Rfl: 2       History Review: The following portions of the patient's history were reviewed and updated as appropriate: allergies, current medications, past family history, past medical history, past social history, past surgical history and problem list          OBJECTIVE:     Vital signs in last 24 hours: There were no vitals filed for this visit      Mental Status Evaluation:    Appearance age appropriate, casually dressed   Behavior cooperative, calm   Speech normal rate, normal volume, normal pitch   Mood depressed   Affect constricted   Thought Processes organized, goal directed   Associations intact associations   Thought Content no overt delusions   Perceptual Disturbances: no auditory hallucinations, no visual hallucinations   Abnormal Thoughts  Risk Potential Suicidal ideation - Yes, without plan  Homicidal ideation - None  Potential for aggression - No   Orientation oriented to person, place, time/date and situation   Memory recent and remote memory grossly intact   Consciousness alert and awake   Attention Span Concentration Span attention span and concentration are age appropriate   Intellect appears to be of average intelligence   Insight intact   Judgement intact   Muscle Strength and  Gait unable to assess today due to virtual visit   Motor activity unable to assess today due to virtual visit   Language no difficulty naming common objects, no difficulty repeating a phrase   Fund of Knowledge adequate knowledge of current events  adequate fund of knowledge regarding past history  adequate fund of knowledge regarding vocabulary    Pain none   Pain Scale 0       Laboratory Results:   Most Recent Labs:   Lab Results   Component Value Date    WBC 8 79 11/04/2021    RBC 4 78 11/04/2021    HGB 13 7 11/04/2021    HCT 43 1 11/04/2021     11/04/2021    RDW 13 1 11/04/2021    NEUTROABS 4 41 11/04/2021    SODIUM 142 11/04/2021    K 4 1 11/04/2021  11/04/2021    CO2 25 11/04/2021    BUN 15 11/04/2021    CREATININE 0 82 11/04/2021    GLUCOSE 109 12/21/2016    CALCIUM 8 9 11/04/2021    AST 15 11/04/2021    ALT 25 11/04/2021    ALKPHOS 66 11/04/2021    TP 7 0 11/04/2021    TBILI 0 20 11/04/2021    CHOLESTEROL 171 11/04/2021    TRIG 105 11/04/2021    HDL 44 11/04/2021    LDLCALC 106 (H) 11/04/2021    NONHDLC 127 11/04/2021    QZP4DEABMGJZ 1 299 11/04/2021    FREET4 0 93 12/07/2016    T3FREE 2 75 12/07/2016    PREGSERUM Negative 12/07/2016    RPR Non-Reactive 11/04/2021     I have personally reviewed all pertinent laboratory/tests results  Assessment/Plan: The patient struggling with significant depression along with active suicidal thoughts  Has a very good support from her   Stressed due to her job lately  The patient was recommended to go to a psychiatric inpatient unit due to severe depression and active suicidal thoughts which she was agreeable with  Her  also agreed and took responsibility of taking her to the psychiatric inpatient unit  Patient was  told that I can talk to the treating psychiatrist if they want to have more collateral information  I also had sent a message to the crisis worker at [facility] ED Jose Guadalupe Bolaños updating about patient coming into the ER and my recommendation of psychiatric inpatient admission  There are no diagnoses linked to this encounter  Treatment Recommendations/Precautions:      Aware of 24 hour and weekend coverage for urgent situations accessed by calling [facility] main practice number    Risks/Benefits      Risks, Benefits And Possible Side Effects Of Medications:    Risks, benefits, and possible side effects of medications explained to [facility] and she verbalizes understanding and agreement for treatment      Controlled Medication Discussion:     [facility] has been filling controlled prescriptions on time as prescribed according to [place] Prescription Drug Monitoring Program  Discussed with Ann Taylor the risks of sedation, respiratory depression, impairment of ability to drive and potential for abuse and addiction related to treatment with benzodiazepine medications  She understands risk of treatment with benzodiazepine medications, agrees to not drive if feels impaired and agrees to take medications as prescribed  Discussed with Ann Taylor increased risk of impairment related to concurrent use of benzodiazepines and hypnotic medications including excessive sedation, psychomotor impairment and respiratory depression  She understands the risk of treatment with benzodiazepines in addition to hypnotic medications and wants to continue taking those medications    Psychotherapy Provided:     Individual psychotherapy provided: Counseling was provided during the session today for 16 minutes  Medications, treatment progress and treatment plan reviewed with Ann Taylor  Medication education provided to Ann Taylor  Reassurance and supportive therapy provided  Crisis/safety plan discussed with Jackelynher Leena Shepherd;    Completed and signed during the session: Not applicable - Treatment Plan not due at this session    Dominic Friend MD 05/11/23

## 2023-05-11 NOTE — ED PROVIDER NOTES
History  Chief Complaint   Patient presents with   • Psychiatric Evaluation     Decided along with psychiatry to come in for medication adjustments for SI thoughts  States started with a break down Tuesday  Patient is a 64-year-old female with a h/o anxiety, BPD, and depression who presents with  at the request of her psychiatrist   Had an appointment today  Admitted to suicidal thoughts and plans  States  has thwarted all her plans in the house with removing items  On meds but states depression has gotten worse over the last week  No specific trigger  States has no energy and cannot bring herself to do daily activities  No HI/hallucinations  No illicit drugs or etoh  Prior to Admission Medications   Prescriptions Last Dose Informant Patient Reported? Taking?    LORazepam (Ativan) 0 5 mg tablet 5/11/2023  No Yes   Sig: Take 1 tablet (0 5 mg total) by mouth 2 (two) times a day as needed for anxiety   Multiple Vitamins-Minerals (QC WOMENS DAILY MULTIVITAMIN PO) 5/11/2023  Yes Yes   Sig: Take by mouth   Probiotic Product (PROBIOTIC PO) 5/10/2023  Yes Yes   Sig: Take by mouth daily after breakfast   QUEtiapine (SEROquel XR) 200 mg 24 hr tablet 5/10/2023  No Yes   Sig: take 1 tablet by mouth at bedtime   QUEtiapine (SEROquel XR) 50 mg Past Month  No Yes   Sig: TAKE 2 TABLETS BY MOUTH EVERY EVENING FOR 14 DAYS AROUND EVERY 2 WEEKS BEFORE MENSTRUAL PERIOD   busPIRone (BUSPAR) 30 MG tablet 5/11/2023  No Yes   Sig: take 1 tablet by mouth twice a day   escitalopram (LEXAPRO) 20 mg tablet 5/11/2023  No Yes   Sig: take 1 tablet by mouth once daily   norethindrone (MICRONOR) 0 35 MG tablet 5/11/2023  No Yes   Sig: Take 1 tablet (0 35 mg total) by mouth daily      Facility-Administered Medications: None       Past Medical History:   Diagnosis Date   • Anxiety    • Asthma    • Borderline personality disorder ([facility] [address] )    • Depression    • Psychiatric illness    • Self-injurious behavior    • Suicide attempt [facility])        Past Surgical History:   Procedure Laterality Date   • EXAMINATION UNDER ANESTHESIA N/A 2023    Procedure: (EUA); Surgeon: Eugene Roberson MD;  Location: [facility];  Service: Gynecology   • INDUCED      • MO HYSTEROSCOPY BX ENDOMETRIUM&/POLYPC W/WO D&C N/A 2023    Procedure: (D&C) W/ HYSTEROSCOPY, EMC WITH POLYPECTOMY;  Surgeon: Eugene Roberson MD;  Location: [facility];  Service: Gynecology       Family History   Problem Relation Age of Onset   • Suicide Attempts Father    • Self-Injury Father    • Alcohol abuse Father    • Depression Maternal Uncle    • Depression Paternal Uncle    • Alcohol abuse Brother    • Depression Brother    • Alcohol abuse Brother      I have reviewed and agree with the history as documented  E-Cigarette/Vaping   • E-Cigarette Use Never User      E-Cigarette/Vaping Substances   • Nicotine No    • THC No    • CBD No    • Flavoring No    • Other No    • Unknown No      Social History     Tobacco Use   • Smoking status: Every Day     Packs/day: 0 50     Types: Cigarettes   • Smokeless tobacco: Never   • Tobacco comments:     Last cigarette 1830   Vaping Use   • Vaping Use: Never used   Substance Use Topics   • Alcohol use: Not Currently     Alcohol/week: 1 0 standard drink     Types: 1 Glasses of wine per week     Comment: once a month   • Drug use: Never       Review of Systems   Constitutional: Positive for activity change  HENT: Negative  Eyes: Negative  Respiratory: Negative  Cardiovascular: Negative  Gastrointestinal: Negative  Endocrine: Negative  Genitourinary: Negative  Musculoskeletal: Negative  Skin: Negative  Allergic/Immunologic: Negative  Neurological: Negative  Hematological: Negative  Psychiatric/Behavioral: Positive for behavioral problems, dysphoric mood and suicidal ideas  All other systems reviewed and are negative  Physical Exam  Physical Exam  Vitals and nursing note reviewed  Constitutional:       Appearance: Normal appearance  She is obese  HENT:      Head: Normocephalic and atraumatic  Nose: Nose normal       Mouth/Throat:      Mouth: Mucous membranes are moist       Pharynx: Oropharynx is clear  Cardiovascular:      Rate and Rhythm: Normal rate and regular rhythm  Pulses: Normal pulses  Heart sounds: Normal heart sounds  Pulmonary:      Effort: Pulmonary effort is normal    Abdominal:      General: Bowel sounds are normal       Palpations: Abdomen is soft  Musculoskeletal:         General: Normal range of motion  Cervical back: Normal range of motion and neck supple  Skin:     General: Skin is warm and dry  Capillary Refill: Capillary refill takes less than 2 seconds  Neurological:      General: No focal deficit present  Mental Status: She is alert and oriented to person, place, and time  Psychiatric:         Attention and Perception: Attention normal          Mood and Affect: Mood is depressed  Affect is flat  Speech: Speech normal          Behavior: Behavior is slowed  Thought Content: Thought content includes suicidal ideation           Cognition and Memory: Cognition normal          Judgment: Judgment normal          Vital Signs  ED Triage Vitals   Temperature Pulse Respirations Blood Pressure SpO2   05/11/23 1548 05/11/23 1524 05/11/23 1524 05/11/23 1524 05/11/23 1524   97 9 °F (36 6 °C) 82 20 145/90 97 %      Temp Source Heart Rate Source Patient Position - Orthostatic VS BP Location FiO2 (%)   05/11/23 1548 05/11/23 1524 05/11/23 1524 05/11/23 1524 --   Oral Monitor Sitting Left arm       Pain Score       --                  Vitals:    05/11/23 1524   BP: 145/90   Pulse: 82   Patient Position - Orthostatic VS: Sitting         Visual Acuity      ED Medications  Medications - No data to display    Diagnostic Studies  Results Reviewed     Procedure Component Value Units Date/Time    Urine Microscopic [[id number]] Collected: 05/11/23 1546    Lab Status: In process Specimen: Urine, Clean Catch Updated: 05/11/23 1600    Rapid drug screen, urine [[id number]] Collected: 05/11/23 1546    Lab Status: In process Specimen: Urine, Catheter Updated: 05/11/23 1552    UA w Reflex to Microscopic w Reflex to Culture [[id number]] Collected: 05/11/23 1546    Lab Status: In process Specimen: Urine, Clean Catch Updated: 05/11/23 1551    POCT pregnancy, urine [[id number]]  (Normal) Resulted: 05/11/23 1550    Lab Status: Final result Updated: 05/11/23 1551     EXT Preg Test, Ur Negative     Control Valid    POCT alcohol breath test [[id number]]  (Normal) Resulted: 05/11/23 1539    Lab Status: Final result Updated: 05/11/23 1539     EXTBreath Alcohol 0 000                 No orders to display              Procedures  Procedures         ED Course                               SBIRT 20yo+    Flowsheet Row Most Recent Value   Initial Alcohol Screen: US AUDIT-C     1  How often do you have a drink containing alcohol? 0 Filed at: 05/11/2023 1533   2  How many drinks containing alcohol do you have on a typical day you are drinking? 0 Filed at: 05/11/2023 1533   3a  Male UNDER 65: How often do you have five or more drinks on one occasion? 0 Filed at: 05/11/2023 1533   3b  FEMALE Any Age, or MALE 65+: How often do you have 4 or more drinks on one occassion? 0 Filed at: 05/11/2023 1533   Audit-C Score 0 Filed at: 05/11/2023 1533   JESSICA: How many times in the past year have you    Used an illegal drug or used a prescription medication for non-medical reasons?  Never Filed at: 05/11/2023 1533                    MDM    Disposition  Final diagnoses:   Severe episode of recurrent major depressive disorder, without psychotic features ([facility] [address] )   Suicidal ideations     Time reflects when diagnosis was documented in both MDM as applicable and the Disposition within this note     Time User Action Codes Description Comment    5/11/2023  3:45 PM Angelia Chavez Add [F33 2] Severe episode of recurrent major depressive disorder, without psychotic features ([facility] [address] )     5/11/2023  3:45 PM Rk Goodrich Add [N66 401] Suicidal ideations       ED Disposition     ED Disposition   Transfer to [address] [place] [address]   --    Date/Time   Thu May 11, 2023  3:45 PM    Comment   Martín Betts should be transferred out to [facility] and has been medically cleared  Follow-up Information    None         Patient's Medications   Discharge Prescriptions    No medications on file       No discharge procedures on file      PDMP Review       Value Time User    PDMP Reviewed  Yes 5/11/2023  1:53 PM Hansel Rubi MD          ED Provider  Electronically Signed by           Comfort Lieberman MD  05/11/23 7694

## 2023-05-11 NOTE — ED NOTES
"The patient is a 41 y/o F referred by her psychiatrist, Chandan Shah MD,  [facility], with whom the patient had a telemetry appointment prior to presenting to the ED  She is accompanied by her , Jeremy More, [phone]  The patient has a history of recurrent major depression, generalized anxiety, and borderline personality disorder, and PMDD  She indicated she has a trauma history as well, and that she did not wish to elaborate during this assessment  The patient stated she was told she has bipolar disorder but disagrees with this  She then related that she has a history of occasional bursts of energy with excessive spending  Triggers she identified for this episode include work stress, ongoing family-of-origin issues, her concern that her  is not attracted to her, and her inability to lose weight  The patient stated she has been increasingly depressed for the past month despite medication compliance  She reported experiencing suicidal ideation without a plan for the last 2 days  She stated that she often experiences a death wish, but is now actively suicidal such that her  will not leave her alone   stated she has a history of multiple, attempts and gestures  She is not able to contract for safety at this time  In addition to active SI, she stated she has not been getting out of bed and has not been caring for herself  Patient reported that her appetite is excessive at times  She stated she has not been brushing her teeth, bathing or changing her clothing  \"I physically can't make myself do it  \" She reports poor energy and no motivation  She has not been engaging in activities she usually enjoys  She has had difficulty concentrating and is not able to tend to her work tasks  The patient identifies her , 3 close friends and her coworkers as supports  Patient has no significant alcohol use  She does not use street drugs  She does not have legal issues    "

## 2023-05-12 RX ORDER — CALCIUM CARBONATE 200(500)MG
500 TABLET,CHEWABLE ORAL ONCE
Status: COMPLETED | OUTPATIENT
Start: 2023-05-12 | End: 2023-05-12

## 2023-05-12 RX ORDER — LORAZEPAM 1 MG/1
1 TABLET ORAL ONCE
Status: COMPLETED | OUTPATIENT
Start: 2023-05-12 | End: 2023-05-12

## 2023-05-12 RX ORDER — BUPROPION HYDROCHLORIDE 100 MG/1
100 TABLET ORAL DAILY
Status: DISCONTINUED | OUTPATIENT
Start: 2023-05-12 | End: 2023-05-13 | Stop reason: HOSPADM

## 2023-05-12 RX ORDER — QUETIAPINE 200 MG/1
200 TABLET, FILM COATED, EXTENDED RELEASE ORAL
Status: DISCONTINUED | OUTPATIENT
Start: 2023-05-12 | End: 2023-05-13 | Stop reason: HOSPADM

## 2023-05-12 RX ORDER — LANOLIN ALCOHOL/MO/W.PET/CERES
6 CREAM (GRAM) TOPICAL
Status: DISCONTINUED | OUTPATIENT
Start: 2023-05-12 | End: 2023-05-13 | Stop reason: HOSPADM

## 2023-05-12 RX ADMIN — BUPROPION HYDROCHLORIDE 100 MG: 100 TABLET, FILM COATED ORAL at 13:10

## 2023-05-12 RX ADMIN — CALCIUM CARBONATE (ANTACID) CHEW TAB 500 MG 500 MG: 500 CHEW TAB at 00:56

## 2023-05-12 RX ADMIN — QUETIAPINE FUMARATE 200 MG: 200 TABLET, EXTENDED RELEASE ORAL at 21:21

## 2023-05-12 RX ADMIN — BUSPIRONE HYDROCHLORIDE 30 MG: 10 TABLET ORAL at 18:28

## 2023-05-12 RX ADMIN — Medication 6 MG: at 23:13

## 2023-05-12 RX ADMIN — LORAZEPAM 1 MG: 1 TABLET ORAL at 23:13

## 2023-05-12 RX ADMIN — ESCITALOPRAM OXALATE 20 MG: 10 TABLET ORAL at 08:15

## 2023-05-12 RX ADMIN — BUSPIRONE HYDROCHLORIDE 30 MG: 10 TABLET ORAL at 08:15
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
## 2023-05-12 NOTE — ED NOTES
Patient is evaluated by Dr Yudi Bates  She will be seen again tomorrow    Patient would like to move to a room with TV

## 2023-05-12 NOTE — ED NOTES
Patient requesting to speak with crisis worker  Patient is tearful and refusing to answer questions   at bedside states that she is upset that she has to stay in the Emergency Department while awaiting a bed and is interested in going home  Crisis worker notified and at bedside        Heron Churchill RN  05/11/23 2008

## 2023-05-12 NOTE — ED NOTES
No available bed at [facility] today  Spoke to patient  She dosen't want to go to another hospital, now is asking to leave    Requested that she be evaluated by psychiatry

## 2023-05-12 NOTE — ED NOTES
Crisis spoke with patient who was tearful and stated that she did not feel like she could stay in the ED overnight while waiting for a bed because she did not feel safe here  Discussed that patient would need to remain in the ED while she waited for a bed  Patient requested something to help her sleep because she felt that the ativan was not working  Notified provider of patient's request for something to help her sleep

## 2023-05-12 NOTE — ED CARE HANDOFF
Emergency Department Sign Out Note        Sign out and transfer of care from Dr Helio Lobato  See Separate Emergency Department note  The patient, Jeronimo Brittle, was evaluated by the previous provider for [facility]  Workup Completed:  201    ED Course / Workup Pending (followup):  Pending placement                                     Procedures  MDM        Disposition  Final diagnoses:   Severe episode of recurrent major depressive disorder, without psychotic features ([facility] [address] )   Suicidal ideations     Time reflects when diagnosis was documented in both MDM as applicable and the Disposition within this note     Time User Action Codes Description Comment    5/11/2023  3:45 PM Tamara Sharpe [F33 2] Severe episode of recurrent major depressive disorder, without psychotic features ([facility] [address] )     5/11/2023  3:45 PM Tamara Sharpe [[id number]] Suicidal ideations       ED Disposition     ED Disposition   Transfer to [address] [place] [address]   --    Date/Time   Thu May 11, 2023  3:45 PM    Comment   Jeronimo Brittle should be transferred out to [facility] and has been medically cleared  Follow-up Information    None       Patient's Medications   Discharge Prescriptions    No medications on file     No discharge procedures on file         ED Provider  Electronically Signed by     María Lowe MD  05/12/23 1661

## 2023-05-13 VITALS
SYSTOLIC BLOOD PRESSURE: 143 MMHG | WEIGHT: 237.6 LBS | DIASTOLIC BLOOD PRESSURE: 87 MMHG | TEMPERATURE: 98.2 F | OXYGEN SATURATION: 98 % | RESPIRATION RATE: 18 BRPM | BODY MASS INDEX: 39.54 KG/M2 | HEART RATE: 73 BPM

## 2023-05-13 RX ORDER — BUPROPION HYDROCHLORIDE 100 MG/1
100 TABLET, EXTENDED RELEASE ORAL DAILY
Qty: 30 TABLET | Refills: 0 | Status: SHIPPED | OUTPATIENT
Start: 2023-05-13 | End: 2023-06-12
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
## 2023-05-13 NOTE — ED NOTES
Patient has been resting quietly in bed with eyes closed with occasional self position changes noted  Respirations are easy and non labored  No apparent discomfort or distress noted  Remains on virtual 1:1 monitoring       Judith Wolf RN  05/13/23 1813

## 2023-05-13 NOTE — ED CARE HANDOFF
Emergency Department Sign Out Note        Sign out and transfer of care from Dr Surendra Arreguin  See Separate Emergency Department note  The patient, Franchesca Elizabeth, was evaluated by the previous provider for psych  Workup Completed:  See previous ED notes    ED Course / Workup Pending (followup):                                  ED Course as of 05/13/23 1542   Sat May 13, 2023   0708 Sign out: 41yoF presenting for depression and SI, signed 201  Psychiatry evaluated patient and will re-assess today  Continue safety plan  [address] Per Dr Rowan Hurley (psychiatry), patient is appropriate for discharge home at this time  Will write prescription for Wellbutrin  1224 Patient well-appearing on exam, no acute distress  Anxious and happy to be discharged  No safety concerns at present  Wellbutrin sent to patient's pharmacy by psychiatry  Will follow up with PCP and outpatient psychiatry  Patient is in agreement with this plan  Procedures  MDM        Disposition  Final diagnoses:   Severe episode of recurrent major depressive disorder, without psychotic features ([facility] [address] )   Suicidal ideations     Time reflects when diagnosis was documented in both MDM as applicable and the Disposition within this note     Time User Action Codes Description Comment    5/11/2023  3:45 PM Richard Chalk Add [F33 2] Severe episode of recurrent major depressive disorder, without psychotic features ([facility] [address] )     5/11/2023  3:45 PM Richard Chalk Add [I30 817] Suicidal ideations       ED Disposition     ED Disposition   Discharge    Condition   Stable    Date/Time   Sat May 13, 2023 12:24 PM    Comment   Franchesca Elizabeth will be discharged home to self-care             Follow-up Information     Follow up With Specialties Details Why Contact Info    Maisha Austin DO Family Medicine Schedule an appointment as soon as possible for a visit   [id number] [facility]  [phone]          Discharge Medication List as of 5/13/2023 12:24 PM START taking these medications    Details   buPROPion (Wellbutrin SR) 100 mg 12 hr tablet Take 1 tablet (100 mg total) by mouth in the morning for 30 doses, Starting Sat 5/13/2023, Until Mon 6/12/2023, Normal         CONTINUE these medications which have NOT CHANGED    Details   busPIRone (BUSPAR) 30 MG tablet take 1 tablet by mouth twice a day, Normal      escitalopram (LEXAPRO) 20 mg tablet take 1 tablet by mouth once daily, Normal      LORazepam (Ativan) 0 5 mg tablet Take 1 tablet (0 5 mg total) by mouth 2 (two) times a day as needed for anxiety, Starting Fri 8/26/2022, Until Thu 5/11/2023 at 2359, Normal      Multiple Vitamins-Minerals (QC WOMENS DAILY MULTIVITAMIN PO) Take by mouth, Historical Med      norethindrone (MICRONOR) 0 35 MG tablet Take 1 tablet (0 35 mg total) by mouth daily, Starting Wed 2/15/2023, Normal      Probiotic Product (PROBIOTIC PO) Take by mouth daily after breakfast, Historical Med      !! QUEtiapine (SEROquel XR) 200 mg 24 hr tablet take 1 tablet by mouth at bedtime, Normal      !! QUEtiapine (SEROquel XR) 50 mg TAKE 2 TABLETS BY MOUTH EVERY EVENING FOR 14 DAYS AROUND EVERY 2 WEEKS BEFORE MENSTRUAL PERIOD, Normal       !! - Potential duplicate medications found  Please discuss with provider  No discharge procedures on file         ED Provider  Electronically Signed by     Sierra Trivedi MD  05/13/23 5990

## 2023-05-13 NOTE — ED NOTES
Patient states that she is not used to sleeping in a loud environment  States that she sleeps in a very quiet environment  Reports even though she had taken her seroquel she was finding it hard to sleep  Dr Padilla was made aware and patient was medicated with ativan and melatonin as ordered  Patient was also given sandwich and snack foods along with 2 cups of water        Enedina Rodríguez RN  05/13/23 0671

## 2023-05-15 ENCOUNTER — TELEMEDICINE (OUTPATIENT)
Dept: PSYCHIATRY | Facility: CLINIC | Age: 38
End: 2023-05-15

## 2023-05-15 ENCOUNTER — TELEPHONE (OUTPATIENT)
Dept: PSYCHIATRY | Facility: CLINIC | Age: 38
End: 2023-05-15

## 2023-05-15 DIAGNOSIS — F33.2 SEVERE EPISODE OF RECURRENT MAJOR DEPRESSIVE DISORDER, WITHOUT PSYCHOTIC FEATURES (HCC): ICD-10-CM

## 2023-05-15 DIAGNOSIS — F60.3 BORDERLINE PERSONALITY DISORDER (HCC): ICD-10-CM

## 2023-05-15 DIAGNOSIS — F41.1 GENERALIZED ANXIETY DISORDER: Primary | ICD-10-CM

## 2023-05-15 NOTE — TELEPHONE ENCOUNTER
[place] [facility] and/or Patient requested a call back to discuss making an appointment  Patient stated she was doing Tele health with Provider       They can be reached at P# [phone]       Thank you

## 2023-05-15 NOTE — PSYCH
Virtual Regular Visit    Verification of patient location:    Patient is located at Home in the following state in which I hold an active license [place]      Assessment/Plan:    Problem List Items Addressed This Visit        Other    Severe episode of recurrent major depressive disorder, without psychotic features ([facility] [address] )    Generalized anxiety disorder - Primary    Borderline personality disorder ([facility] )       Goals addressed in session: Goal 1 and Goal 2          Reason for visit is   Chief Complaint   Patient presents with   • Virtual Regular Visit        Encounter provider Mena Connors MD    Provider located at [facility] [id number]      Recent Visits  Date Type Provider Dept   05/11/23 Telemedicine MD Dany Holley 72 recent visits within past 7 days and meeting all other requirements  Today's Visits  Date Type Provider Dept   05/15/23 Telemedicine Mena Connors MD [facility]   05/15/23 Telephone Mena Connors MD [facility] today's visits and meeting all other requirements  Future Appointments  No visits were found meeting these conditions  Showing future appointments within next 150 days and meeting all other requirements       The patient was identified by name and date of birth  Cheyanne Ervin was informed that this is a telemedicine visit and that the visit is being conducted throughthe [place] [facility]  She agrees to proceed     My office door was closed  No one else was in the room  She acknowledged consent and understanding of privacy and security of the video platform  The patient has agreed to participate and understands they can discontinue the visit at any time  Patient is aware this is a billable service       Subjective  see below      HPI     Past Medical History:   Diagnosis Date   • Anxiety    • Asthma    • Borderline personality disorder [facility])    • Depression    • Psychiatric illness    • Self-injurious behavior    • Suicide attempt [facility])        Past Surgical History:   Procedure Laterality Date   • EXAMINATION UNDER ANESTHESIA N/A 2023    Procedure: (EUA); Surgeon: Candis Isbell MD;  [facility];  Service: Gynecology   • INDUCED      • [place] HYSTEROSCOPY BX ENDOMETRIUM&/POLYPC W/WO D&C N/A 2023    Procedure: (D&C) W/ HYSTEROSCOPY, EMC WITH POLYPECTOMY;  Surgeon: Candis Isbell MD;  Location: [facility];  Service: Gynecology       Current Outpatient Medications   Medication Sig Dispense Refill   • buPROPion (Wellbutrin SR) 100 mg 12 hr tablet Take 1 tablet (100 mg total) by mouth in the morning for 30 doses 30 tablet 0   • busPIRone (BUSPAR) 30 MG tablet take 1 tablet by mouth twice a day 60 tablet 2   • escitalopram (LEXAPRO) 20 mg tablet take 1 tablet by mouth once daily 30 tablet 2   • LORazepam (Ativan) 0 5 mg tablet Take 1 tablet (0 5 mg total) by mouth 2 (two) times a day as needed for anxiety 30 tablet 1   • Multiple Vitamins-Minerals (QC WOMENS DAILY MULTIVITAMIN PO) Take by mouth     • norethindrone (MICRONOR) 0 35 MG tablet Take 1 tablet (0 35 mg total) by mouth daily 84 tablet 3   • Probiotic Product (PROBIOTIC PO) Take by mouth daily after breakfast     • QUEtiapine (SEROquel XR) 200 mg 24 hr tablet take 1 tablet by mouth at bedtime 30 tablet 2   • QUEtiapine (SEROquel XR) 50 mg TAKE 2 TABLETS BY MOUTH EVERY EVENING FOR 14 DAYS AROUND EVERY 2 WEEKS BEFORE MENSTRUAL PERIOD 28 tablet 2     No current facility-administered medications for this visit  Allergies   Allergen Reactions   • Ceclor [Cefaclor] Hives   • Nuts - Food Allergy GI Intolerance   • Latex Rash       Review of Systems    Video Exam    There were no vitals filed for this visit      Physical Exam     Visit Time    Visit Start Time: 1:35 PM  Visit Stop Time: 2 PM  Total Visit Duration:  minutes     MEDICATION MANAGEMENT NOTE        [facility]      Name and Date of Birth:  Cesar Goldman 42 y o  1985 MRN: [id number]    Date of Visit: May 15, 2023    Reason for Visit: Follow-up for medication management    Subjective: The patient had gone to the ED last Thursday due to depression and suicidal ideation  The patient was in the ER till Saturday  There was no bed availability in the inpatient unit at [address] [place] [address]  The patient also did not want to go to any other hospital   The patient while being in the ER had a consultation with a psychiatrist and was started on Wellbutrin  mg daily  The patient felt  depression improved and wanted to go home  She was denying any suicidal ideation  The psychiatry team also developed a safety plan for the patient and was subsequently discharged  The patient saw me today and reported that she felt more depressed while being in the ER initially as there was no bed available and she did not have much distraction in the ER room  She reported once she was started on Wellbutrin she also started feeling better and wanted to go home  She currently reports her depression has been better and rates it at 3 on a scale of 0-10 with 10 being the worst   She denies any suicidal ideation for last couple of days  Reports her  is also at home with her today and keeping an eye on her  She also reported she has a good support from her friends who can be with her  The patient reports she also going to follow up with her psychotherapist at least 2 times a week  I also gave the patient the option of considering going to the partial program and she will let me know after seeing her therapist   The patient reports medication Wellbutrin has been working well for her so far and denies any side effect    Patient was advised if she feels her depression is better but still not resolved I can go further up on the Wellbutrin dose given she is only on Wellbutrin  mg once a day  The patient agreed  Most of the appointment today was provided in providing supportive psychotherapy and working out on her safety plan  She reports she would call crisis or 911 if in case she again does not feel safe or will let her  or friend know about it  She is compliant with her other medications too and denies any side effect  Reports anxiety has been okay  Denies any anger outbursts or mood swings  Denies any other concern today  Review Of Systems:      Constitutional negative   ENT negative   Cardiovascular negative   Respiratory negative   Gastrointestinal negative   Genitourinary negative   Musculoskeletal negative   Integumentary negative   Neurological negative   Endocrine negative   Other Symptoms none       Alcohol/Substance Abuse: Denies        Past Medical History:    Past Medical History:   Diagnosis Date   • Anxiety    • Asthma    • Borderline personality disorder ([facility] [address] )    • Depression    • Psychiatric illness    • Self-injurious behavior    • Suicide attempt [facility])         Past Surgical History:   Procedure Laterality Date   • EXAMINATION UNDER ANESTHESIA N/A 2023    Procedure: (EUA);   Surgeon: Weston Avila MD;  [facility];  Service: Gynecology   • INDUCED      • [place] HYSTEROSCOPY BX ENDOMETRIUM&/POLYPC W/WO D&C N/A 2023    Procedure: (D&C) W/ HYSTEROSCOPY, EMC WITH POLYPECTOMY;  Surgeon: Weston Avila MD;  [facility];  Service: Gynecology     Allergies   Allergen Reactions   • Ceclor [Cefaclor] Hives   • Nuts - Food Allergy GI Intolerance   • Latex Rash       Current Medications:       Current Outpatient Medications:   •  buPROPion (Wellbutrin SR) 100 mg 12 hr tablet, Take 1 tablet (100 mg total) by mouth in the morning for 30 doses, Disp: 30 tablet, Rfl: 0  •  busPIRone (BUSPAR) 30 MG tablet, take 1 tablet by mouth twice a day, Disp: 60 tablet, Rfl: 2  •  escitalopram (LEXAPRO) 20 mg tablet, take 1 tablet by mouth once daily, Disp: 30 tablet, Rfl: 2  •  LORazepam (Ativan) 0 5 mg tablet, Take 1 tablet (0 5 mg total) by mouth 2 (two) times a day as needed for anxiety, Disp: 30 tablet, Rfl: 1  •  Multiple Vitamins-Minerals (QC WOMENS DAILY MULTIVITAMIN PO), Take by mouth, Disp: , Rfl:   •  norethindrone (MICRONOR) 0 35 MG tablet, Take 1 tablet (0 35 mg total) by mouth daily, Disp: 84 tablet, Rfl: 3  •  Probiotic Product (PROBIOTIC PO), Take by mouth daily after breakfast, Disp: , Rfl:   •  QUEtiapine (SEROquel XR) 200 mg 24 hr tablet, take 1 tablet by mouth at bedtime, Disp: 30 tablet, Rfl: 2  •  QUEtiapine (SEROquel XR) 50 mg, TAKE 2 TABLETS BY MOUTH EVERY EVENING FOR 14 DAYS AROUND EVERY 2 WEEKS BEFORE MENSTRUAL PERIOD, Disp: 28 tablet, Rfl: 2       History Review: The following portions of the patient's history were reviewed and updated as appropriate: allergies, current medications, past family history, past medical history, past social history, past surgical history and problem list          OBJECTIVE:     Vital signs in last 24 hours: There were no vitals filed for this visit      Mental Status Evaluation:    Appearance age appropriate, casually dressed   Behavior cooperative, calm   Speech normal rate, normal volume, normal pitch   Mood mildly depressed   Affect brighter   Thought Processes organized, goal directed   Associations intact associations   Thought Content no overt delusions   Perceptual Disturbances: no auditory hallucinations, no visual hallucinations   Abnormal Thoughts  Risk Potential Suicidal ideation - None  Homicidal ideation - None  Potential for aggression - No   Orientation oriented to person, place, time/date and situation   Memory recent and remote memory grossly intact   Consciousness alert and awake   Attention Span Concentration Span attention span and concentration are age appropriate   Intellect appears to be of average intelligence   Insight intact   Judgement intact   Muscle Strength and  Gait unable to assess today due to virtual visit   Motor activity unable to assess today due to virtual visit   Language no difficulty naming common objects, no difficulty repeating a phrase   Fund of Knowledge adequate knowledge of current events  adequate fund of knowledge regarding past history  adequate fund of knowledge regarding vocabulary    Pain none   Pain Scale 0       Laboratory Results:   Most Recent Labs:   Lab Results   Component Value Date    WBC 8 79 11/04/2021    RBC 4 78 11/04/2021    HGB 13 7 11/04/2021    HCT 43 1 11/04/2021     11/04/2021    RDW 13 1 11/04/2021    NEUTROABS 4 41 11/04/2021    SODIUM 142 11/04/2021    K 4 1 11/04/2021     11/04/2021    CO2 25 11/04/2021    BUN 15 11/04/2021    CREATININE 0 82 11/04/2021    GLUCOSE 109 12/21/2016    CALCIUM 8 9 11/04/2021    AST 15 11/04/2021    ALT 25 11/04/2021    ALKPHOS 66 11/04/2021    TP 7 0 11/04/2021    TBILI 0 20 11/04/2021    CHOLESTEROL 171 11/04/2021    TRIG 105 11/04/2021    HDL 44 11/04/2021    LDLCALC 106 (H) 11/04/2021    [place] 127 11/04/2021    HFQ9CSBTDUPF 1 299 11/04/2021    FREET4 0 93 12/07/2016    T3FREE 2 75 12/07/2016    PREGSERUM Negative 12/07/2016    RPR Non-Reactive 11/04/2021     I have personally reviewed all pertinent laboratory/tests results  Assessment/Plan: The patient currently endorses mild depression which has been much better compared to last week when she was severely depressed  She also denies any SI today  Reports having good support from her  and friends  She also was started on Wellbutrin in the ER and no side effects so far  The patient also plans to follow with her psychotherapist  couple of times a week now  She also has the option of considering partial program and will let me know if she wants to be sent a referral   Plan at this time is to continue with her current medication with no changes    The patient will follow up with me in 3 weeks or sooner if needed  The patient was also advised to call crisis, 911 or visit nearby ER in case of any emergency or having any SI or HI  She also will let her  and her friends know if she does not feel safe  She also reports her  or friend will be with her  Diagnoses and all orders for this visit:    Generalized anxiety disorder    Borderline personality disorder ([facility] [address] )    Severe episode of recurrent major depressive disorder, without psychotic features [facility]          Treatment Recommendations/Precautions:      Aware of 24 hour and weekend coverage for urgent situations accessed by calling [facility] main practice number    Risks/Benefits      Risks, Benefits And Possible Side Effects Of Medications:    Risks, benefits, and possible side effects of medications explained to Danae Obregon and she verbalizes understanding and agreement for treatment  Risks of medications in pregnancy explained to Danae Obregon  She verbalizes understanding and agrees to notify her doctor if she becomes pregnant  Controlled Medication Discussion:     Danae Obregon has been filling controlled prescriptions on time as prescribed according to [facility]  Discussed with Danae Obregon the risks of sedation, respiratory depression, impairment of ability to drive and potential for abuse and addiction related to treatment with benzodiazepine medications  She understands risk of treatment with benzodiazepine medications, agrees to not drive if feels impaired and agrees to take medications as prescribed  Discussed with Danae Obregon increased risk of impairment related to concurrent use of benzodiazepines and hypnotic medications including excessive sedation, psychomotor impairment and respiratory depression   She understands the risk of treatment with benzodiazepines in addition to hypnotic medications and wants to continue taking those medications    Psychotherapy Provided:     Individual psychotherapy provided: Counseling was provided during the session today for 16 minutes  Medications, treatment progress and treatment plan reviewed with Nicolette Res  Medication education provided to Lonell Res  Reassurance and supportive therapy provided  Crisis/safety plan discussed with Jackelyn       Treatment Plan;    Completed and signed during the session: Not applicable - Treatment Plan not due at this session    Tk Pineda MD 05/15/23

## 2023-05-22 DIAGNOSIS — F41.1 GENERALIZED ANXIETY DISORDER: ICD-10-CM

## 2023-05-22 DIAGNOSIS — F33.2 SEVERE EPISODE OF RECURRENT MAJOR DEPRESSIVE DISORDER, WITHOUT PSYCHOTIC FEATURES (HCC): ICD-10-CM

## 2023-05-22 RX ORDER — QUETIAPINE FUMARATE 50 MG/1
TABLET, EXTENDED RELEASE ORAL
Qty: 28 TABLET | Refills: 2 | Status: SHIPPED | OUTPATIENT
Start: 2023-05-22

## 2023-05-24 ENCOUNTER — TELEPHONE (OUTPATIENT)
Dept: PSYCHIATRY | Facility: CLINIC | Age: 38
End: 2023-05-24

## 2023-05-24 NOTE — TELEPHONE ENCOUNTER
Case Management received a referral from Dr Willis Richard to assist with [facility] for Otelia Cones  Baudilio Valdivia was recently at the hospital and she is looking to enroll in a [place] as she has not had success with PHP  Otelia Cones agreered upon an anticipated return to work date of 7/31/23, but it may need to be adjusted depending on the facility she seeks treatment with  FMLA paperwork was completed  Placed on Dr Hooper Remednelida desk for review and signature  Will need to be emailed to patient by 5/30/2023

## 2023-05-24 NOTE — TELEPHONE ENCOUNTER
Paperwork completed  Placed on Dr Vinicio Tolbert desk for review and signature  Once signed, paperwork will need to be faxed to [facility]

## 2023-05-24 NOTE — TELEPHONE ENCOUNTER
Case Management received a referral from Dr Corin Gallo to assist in completing the Attending [phone] statement for Korin Rogers Shravan is looking to take leave from 5/10 to 7/31 so she can enroll into a residential treatment facility

## 2023-06-26 ENCOUNTER — TELEPHONE (OUTPATIENT)
Dept: PSYCHIATRY | Facility: CLINIC | Age: 38
End: 2023-06-26

## 2023-06-26 NOTE — TELEPHONE ENCOUNTER
Patient requested to transfer provider to a female provider, called patient to madeleine with [facility], left a message requesting a call back

## (undated) DEVICE — UNDER BUTTOCKS DRAPE W/FLUID CONTROL POUCH: Brand: CONVERTORS

## (undated) DEVICE — LIGHT GLOVE GREEN

## (undated) DEVICE — 2000CC GUARDIAN II: Brand: GUARDIAN

## (undated) DEVICE — SCD SEQUENTIAL COMPRESSION COMFORT SLEEVE MEDIUM KNEE LENGTH: Brand: KENDALL SCD

## (undated) DEVICE — CYSTO TUBING SINGLE IRRIGATION

## (undated) DEVICE — PREMIUM DRY TRAY LF: Brand: MEDLINE INDUSTRIES, INC.

## (undated) DEVICE — BETHLEHEM UNIVERSAL MINOR VAG: Brand: CARDINAL HEALTH

## (undated) DEVICE — SURGICAL GOWN, XL SMARTSLEEVE: Brand: CONVERTORS

## (undated) DEVICE — ENDOMETRIAL BX PIPELLE

## (undated) DEVICE — PVC URETHRAL CATHETER: Brand: DOVER

## (undated) DEVICE — IV EXTENSION TUBING 33 IN

## (undated) DEVICE — STRL ALLENTOWN HYSTEROSCOPY PK: Brand: CARDINAL HEALTH

## (undated) DEVICE — GLOVE INDICATOR PI UNDERGLOVE SZ 8 BLUE

## (undated) DEVICE — GLOVE PI ULTRA TOUCH SZ.8.0